# Patient Record
Sex: FEMALE | Race: WHITE | NOT HISPANIC OR LATINO | Employment: OTHER | URBAN - METROPOLITAN AREA
[De-identification: names, ages, dates, MRNs, and addresses within clinical notes are randomized per-mention and may not be internally consistent; named-entity substitution may affect disease eponyms.]

---

## 2017-09-19 ENCOUNTER — OFFICE VISIT (OUTPATIENT)
Dept: GYNECOLOGIC ONCOLOGY | Facility: CLINIC | Age: 67
End: 2017-09-19

## 2017-09-19 VITALS
OXYGEN SATURATION: 94 % | DIASTOLIC BLOOD PRESSURE: 80 MMHG | HEIGHT: 61 IN | SYSTOLIC BLOOD PRESSURE: 147 MMHG | HEART RATE: 73 BPM | TEMPERATURE: 98.4 F | RESPIRATION RATE: 16 BRPM | WEIGHT: 127 LBS | BODY MASS INDEX: 23.98 KG/M2

## 2017-09-19 DIAGNOSIS — C53.0 MALIGNANT NEOPLASM OF ENDOCERVIX (HCC): Primary | ICD-10-CM

## 2017-09-19 DIAGNOSIS — N90.89 VULVAR LESION: ICD-10-CM

## 2017-09-19 DIAGNOSIS — Z72.0 TOBACCO ABUSE: ICD-10-CM

## 2017-09-19 PROCEDURE — 99205 OFFICE O/P NEW HI 60 MIN: CPT | Performed by: OBSTETRICS & GYNECOLOGY

## 2017-09-19 PROCEDURE — 57454 BX/CURETT OF CERVIX W/SCOPE: CPT | Performed by: OBSTETRICS & GYNECOLOGY

## 2017-09-19 PROCEDURE — 88305 TISSUE EXAM BY PATHOLOGIST: CPT | Performed by: OBSTETRICS & GYNECOLOGY

## 2017-09-19 RX ORDER — HYDROXYCHLOROQUINE SULFATE 200 MG/1
100 TABLET, FILM COATED ORAL DAILY
Status: ON HOLD | COMMUNITY
Start: 2017-09-18 | End: 2020-06-25 | Stop reason: SDUPTHER

## 2017-09-19 RX ORDER — MELATONIN
1000 DAILY
COMMUNITY
End: 2022-11-21

## 2017-09-19 RX ORDER — ESTRADIOL 0.1 MG/G
2 CREAM VAGINAL WEEKLY
COMMUNITY
End: 2018-02-27 | Stop reason: HOSPADM

## 2017-09-19 NOTE — PROGRESS NOTES
Ro Walker  [id number]  1950      Reason for visit:  Cervical cancer noted on Pap smear, vulvar lesions    Consultation:  Patient is being seen at the request of Dinah Burrell for Cervical cancer noted on Pap smear, vulvar lesions    History of present illness:  The patient is a 67 y.o. year old female who presents today for treatment and evaluation of abnormal Pap smear and vulvar lesions.    On 2017 patient underwent Pap smear which was resulted as squamous cell carcinoma.  She reports that her previous Pap smear was in  in [place].  She denies history of abnormal Pap smear prior to this one.  She has not undergone biopsy at this point.  Her graft patient denies vaginal bleeding and discharge.  She denies intercourse for several years.  She does note urinary frequency.  She has a mild morning cough related to long-term tobacco abuse.  She is seeing an ENT related to polyps on her vocal cords.  She has follow-up due in 2 days.    Her personal history is remarkable for lupus for which she sees Dr. David Allen.  She reports that she had a couple flareups in  but has been doing well this year.    OBGYN History:  She is a .  She does not use HRT.       Oncologic History:   No history exists.         Past Medical History:   Diagnosis Date   • Lupus        Past Surgical History:   Procedure Laterality Date   • CATARACT EXTRACTION     • OTHER SURGICAL HISTORY      bandaid surgery   • RETINAL DETACHMENT REPAIR     • THROAT SURGERY         MEDICATIONS: The current medication list was reviewed with the patient and updated in the EMR this date per the Medical Assistant. Medication dosages and frequencies were confirmed to be accurate.      Allergies:  has No Known Allergies.    Social History:   Social History     Social History   • Marital status:      Spouse name: N/A   • Number of children: 4   • Years of education: N/A     Occupational History   • Not on file.     Social  History Main Topics   • Smoking status: Current Every Day Smoker     Packs/day: 1.00     Types: Cigarettes   • Smokeless tobacco: Not on file   • Alcohol use No   • Drug use: No   • Sexual activity: No     Other Topics Concern   • Not on file     Social History Narrative   • No narrative on file       Family History:    Family History   Problem Relation Age of Onset   • Stomach cancer Sister    • Cancer Brother    • Cancer Brother        Health Maintenance:    Health Maintenance   Topic Date Due   • TDAP/TD VACCINES (1 - Tdap) 04/07/1967   • PNEUMOCOCCAL VACCINES (65+ LOW/MEDIUM RISK) (1 of 2 - PCV13) 04/07/2013   • INFLUENZA VACCINE  08/01/2017   • HEPATITIS C SCREENING  09/19/2017   • MEDICARE ANNUAL WELLNESS  09/19/2017   • MAMMOGRAM  09/19/2017   • COLONOSCOPY  09/19/2017   • ZOSTER VACCINE  09/19/2017         Review of Systems   Constitutional: Negative for appetite change, chills, fatigue, fever and unexpected weight change.   HENT: Positive for sinus pressure and voice change. Negative for congestion, hearing loss and sore throat.    Eyes: Positive for visual disturbance. Negative for redness.   Respiratory: Negative for cough, shortness of breath and wheezing.    Cardiovascular: Negative for chest pain, palpitations and leg swelling.   Gastrointestinal: Negative for abdominal distention, abdominal pain, blood in stool, constipation, diarrhea, nausea and vomiting.   Endocrine: Negative.  Negative for cold intolerance and heat intolerance.   Genitourinary: Negative for difficulty urinating, dyspareunia, dysuria, frequency, genital sores, hematuria, pelvic pain, urgency, vaginal bleeding, vaginal discharge and vaginal pain.   Musculoskeletal: Negative for arthralgias, back pain, gait problem and joint swelling.   Skin: Negative for rash and wound.   Allergic/Immunologic: Negative for food allergies and immunocompromised state.   Neurological: Negative for dizziness, seizures, syncope, weakness,  "light-headedness, numbness and headaches.   Hematological: Negative for adenopathy. Bruises/bleeds easily.   Psychiatric/Behavioral: Negative.  Negative for confusion, dysphoric mood and sleep disturbance. The patient is not nervous/anxious.        Physical Exam    Vitals:    09/19/17 0957   BP: 147/80   Pulse: 73   Resp: 16   Temp: 98.4 °F (36.9 °C)   TempSrc: Temporal Artery    SpO2: 94%   Weight: 127 lb (57.6 kg)   Height: 61\" (154.9 cm)     Body mass index is 24 kg/(m^2).      GENERAL: Alert, well-appearing female appearing her stated age who is in no apparent distress.   HEENT: Sclera anicteric. Head normocephalic, atraumatic. Mucus membranes moist.   NECK: Trachea midline, supple, without masses.  No thyromegaly.   BREASTS: Deferred  CARDIOVASCULAR: Normal rate, regular rhythm, no murmurs, rubs, or gallops.  No peripheral edema.  RESPIRATORY: Clear to auscultation bilaterally, normal respiratory effort  BACK:  No CVA tenderness, no vertebral tenderness on palpation  GASTROINTESTINAL:  Abdomen is soft, non-tender, non-distended, no rebound or guarding, no masses, or hernias. No HSM.    SKIN:  Warm, dry, well-perfused.  All visible areas intact.  No rashes, lesions, ulcers.  Sun damage to exposed extremities.  PSYCHIATRIC: AO x3, with appropriate affect, normal thought processes.  NEUROLOGIC: No focal deficits.  Moves extremities well.  MUSCULOSKELETAL: Normal gait and station.   EXTREMITIES:   No cyanosis, clubbing, symmetric.  LYMPHATICS:  No cervical or inguinal adenopathy noted.     PELVIC exam:    GYNECOLOGIC:  External genitalia are remarkable for 3 distinct labial lesions.  These are pigmented, verrucoid lesions located at the right anterior/periclitoral area, and right labia minora x2.   The largest of these is about a centimeter in size.  On speculum examination, the cervix was initially thought to be free from lesion, but upon closer inspection areas suspicious for cancer was identified.  Please refer " to colposcopy.  On bimanual examination no mass was appreciated.  Uterus was normal in size and shape. There is no cervical motion or uterine tenderness. Parametria were smooth. Rectovaginal exam was confirmatory.    ECOG PS 0    PROCEDURES:  After obtaining informed consent, colposcopy was performed of the cervix.  3% acetic acid was applied.  Colposcopy was adequate.  Findings were remarkable for ulcerative clock located from 3 to 5:00 and high-grade dysplastic changes at the remainder of the transition zone.  Findings were concerning for visible cancer (clinical stage I B1) Biopsy was performed at 12:00 and 3/5:00.  Endocervical curettings were also obtained.  Adequate hemostasis was noted at the end of the procedure.  Procedure was well-tolerated.      Diagnostic Data:    No results found for: ]      Assessment/Plan   This is a 67 y.o. woman findings concerning for clinical stage IB 1 (T1 B1 N0M0) squamous cell carcinoma of the cervix, multiple suspicious vulvar lesions, tobacco abuse.      Cervical cancer  -Colposcopy and biopsies were obtained today.  Patient understands that there likely she'll need cervical conization for additional tissue in order to make recommendations regarding hysterectomy versus radical hysterectomy or radiation.  If patient is a candidate for radical hysterectomy or radiation, at this point she would prefer radiation due to concerns about anesthesia.    Multiple verrucoid vulvar lesions  -These are concerning for condyloma acuminata versus REILLY with pigmentation (bowenoid disease could not be excluded).   These lesions will need to be surgically excised.  I plan on doing this at the time of cervical conization.    Tobacco abuse  -Patient reports that she had quit smoking due to concern about vocal cord polyps.  However, she became stress when she heard about her abnormal Pap smear and started smoking again.  She is further educated regarding the origins of cervical cancer  including HPV and tobacco as a contributing risk factor.    Diagrams were drawn and used in counseling to further educate the patient and her daughter on her conditions.    FOLLOW UP: Return for patient is to call for results.    Note: Speech recognition transcription software was used to dictate portions of this document.  An attempt at proofreading has been made though minor errors in transcription may still be present.  Please do not hesitate to call our office with any questions.      Electronically Signed by: María Dietrich MD  Date: 9/19/2017

## 2017-09-21 LAB
CYTO UR: NORMAL
LAB AP CASE REPORT: NORMAL
LAB AP CLINICAL INFORMATION: NORMAL
Lab: NORMAL
PATH REPORT.FINAL DX SPEC: NORMAL
PATH REPORT.GROSS SPEC: NORMAL

## 2017-09-25 ENCOUNTER — TELEPHONE (OUTPATIENT)
Dept: GYNECOLOGIC ONCOLOGY | Facility: CLINIC | Age: 67
End: 2017-09-25

## 2017-09-25 NOTE — TELEPHONE ENCOUNTER
I called pt to discuss biopsy results.  I recommended cervical conization.  She is unsure at this point.  She was strongly encouraged to undergo additional evaluation to confirm or refute invasive cancer diagnosis.  We discussed how cold knife conization could also be a therapeutic benefit.  All questions were answered.  I'm going to have my  call the patient for surgical plan.  However, the patient would prefer to wait a couple days so she can talk to offer children.  She was encouraged to get procedure done..

## 2017-10-02 ENCOUNTER — TELEPHONE (OUTPATIENT)
Dept: GYNECOLOGIC ONCOLOGY | Facility: CLINIC | Age: 67
End: 2017-10-02

## 2017-10-02 NOTE — TELEPHONE ENCOUNTER
Phoned pt to schedule date for outpatient surgery with Dr. Dietrich.  Unable to leave message on cell, voice mailbox not set up yet.

## 2017-10-10 ENCOUNTER — TELEPHONE (OUTPATIENT)
Dept: GYNECOLOGIC ONCOLOGY | Facility: CLINIC | Age: 67
End: 2017-10-10

## 2017-10-10 NOTE — TELEPHONE ENCOUNTER
Phoned pt to schedule date for outpatient surgery scheduling with Dr. Dietrich.  Pt states is still undecided, needs to talk with her son whom she not been able to reach yet, he is in the  and she has not had a chance to talk to him yet. States she will get back with me next week.  Told her I would call her if I did not hear from her by the end of next week.  Pt v/u, will call me next week.

## 2017-10-23 ENCOUNTER — TELEPHONE (OUTPATIENT)
Dept: GYNECOLOGIC ONCOLOGY | Facility: CLINIC | Age: 67
End: 2017-10-23

## 2017-10-26 ENCOUNTER — TELEPHONE (OUTPATIENT)
Dept: GYNECOLOGIC ONCOLOGY | Facility: CLINIC | Age: 67
End: 2017-10-26

## 2017-10-26 NOTE — TELEPHONE ENCOUNTER
I called pt to discuss difficulty scheduling [facility] for CIS of cervix.  Readdressed pre-cancer vs invasive cancer, need for additional tissue.  Patient said that she would rather die than get radiation.  I advised her don't have a diagnosis of invasive cancer at this point and strongly recommended she consider the planned procedure.  She stated that she saw Dinah Burrell and Dinah recommended that she undergo this procedure.  She stated she would talk to her son about it.  She was offered repeat office visit for discussion of biopsy results versus preoperative discussion at the time of cervical conization.  She promised to call back.  I told her she is really too young to die because of this.  She verbalized understanding.

## 2017-11-01 ENCOUNTER — PREP FOR SURGERY (OUTPATIENT)
Dept: GYNECOLOGIC ONCOLOGY | Facility: CLINIC | Age: 67
End: 2017-11-01

## 2017-11-01 DIAGNOSIS — D06.9 CIN III (CERVICAL INTRAEPITHELIAL NEOPLASIA GRADE III) WITH SEVERE DYSPLASIA: Primary | ICD-10-CM

## 2017-11-28 ENCOUNTER — TELEPHONE (OUTPATIENT)
Dept: GYNECOLOGIC ONCOLOGY | Facility: CLINIC | Age: 67
End: 2017-11-28

## 2017-11-28 NOTE — TELEPHONE ENCOUNTER
Phoned pt to schedule date for surgery, states she is not scheduling anything.  Reminded pt of her phone conversation with Dr. Dietrich.  Pt v/u of conversation.  Told her I would let Dr. Dietrich she has chosen not to do anything.

## 2017-12-04 ENCOUNTER — DOCUMENTATION (OUTPATIENT)
Dept: GYNECOLOGIC ONCOLOGY | Facility: CLINIC | Age: 67
End: 2017-12-04

## 2017-12-04 NOTE — PROGRESS NOTES
Serena Dietrich MD                     Patient phoned to make an appointment to come in with her children to discuss surgery.

## 2017-12-28 ENCOUNTER — OFFICE VISIT (OUTPATIENT)
Dept: GYNECOLOGIC ONCOLOGY | Facility: CLINIC | Age: 67
End: 2017-12-28

## 2017-12-28 ENCOUNTER — PREP FOR SURGERY (OUTPATIENT)
Dept: GYNECOLOGIC ONCOLOGY | Facility: CLINIC | Age: 67
End: 2017-12-28

## 2017-12-28 VITALS
HEART RATE: 91 BPM | DIASTOLIC BLOOD PRESSURE: 78 MMHG | BODY MASS INDEX: 23.22 KG/M2 | TEMPERATURE: 98.5 F | RESPIRATION RATE: 14 BRPM | WEIGHT: 123 LBS | OXYGEN SATURATION: 98 % | SYSTOLIC BLOOD PRESSURE: 148 MMHG | HEIGHT: 61 IN

## 2017-12-28 DIAGNOSIS — D06.9 CARCINOMA IN SITU OF CERVIX, UNSPECIFIED LOCATION: Primary | ICD-10-CM

## 2017-12-28 DIAGNOSIS — D06.9 CIN III (CERVICAL INTRAEPITHELIAL NEOPLASIA GRADE III) WITH SEVERE DYSPLASIA: Primary | ICD-10-CM

## 2017-12-28 PROCEDURE — 99213 OFFICE O/P EST LOW 20 MIN: CPT | Performed by: OBSTETRICS & GYNECOLOGY

## 2017-12-28 NOTE — PROGRESS NOTES
Patient presented today with her family for discussion regarding cervical biopsies obtained at last visit.  She has been contacted multiple times regarding cervical conization and need for additional tissue for diagnosis.  She was afraid of possible cancer diagnosis and has had delay in follow-up related to this year.    Patient and family were provided with a copy of her pathology report.  I personally daphne diagrams explaining different levels of cervical dysplasia versus invasive cancer.  I drew diagrams of female anatomy explaining the need for additional tissue in that there could be a therapeutic as well as diagnostic benefit of cervical conization.  Patient family verbalized understanding of the rationale and the plan.  Patient and family understand that invasive cancer needs to be excluded.  They understand treatment recommendations will be made based on pathology report and any additional required information to be addressed based on final pathology report such as radiologic imaging.    Patient was consented for cold knife conization.      Risks and benefits of surgery were discussed.  This included, but was not limited to, infection and bleeding like when the skin is cut; damage to surrounding structures; and incisional complications.  Risk of DVT was addressed for major surgeries.  Standard of care efforts to minimize these risks were reviewed.  Typical hospital stay and recovery were discussed as well as post-procedure precautions.  Surgical implications of chronic illnesses on recovery and surgical outcome were reviewed.     Patient verbalized understanding of the plan including the risks and benefits.  Appropriate perioperative testing including laboratory evaluation, EKG as clinically indicated, chest x-ray as clinically indicated, and preadmission evaluation were all ordered as a part of this patient's care.    This was a 15 minute long visit with the entirety spent face-to-face consultation  discussing carcinoma in situ diagnosis and treatment thereof.    María Dietrich MD  12/28/17  6:19 PM

## 2018-01-03 ENCOUNTER — PATIENT EDUCATION (SURGERY INSTRUCTIONS) (OUTPATIENT)
Dept: GYNECOLOGIC ONCOLOGY | Facility: CLINIC | Age: 68
End: 2018-01-03

## 2018-01-03 ENCOUNTER — TELEPHONE (OUTPATIENT)
Dept: GYNECOLOGIC ONCOLOGY | Facility: CLINIC | Age: 68
End: 2018-01-03

## 2018-01-03 NOTE — TELEPHONE ENCOUNTER
Orders faxed successfully to [facility] for pt's upcoming outpt procedure with Dr. Dietrich.

## 2018-01-03 NOTE — PATIENT INSTRUCTIONS
Outpatient Pre-op Patient Education  *See checked boxes for your instructions*    Ro Walker  [id number]  1950    SURGEON: Dr. Dietrich    Appointment  [x]  1. Your surgery has been scheduled on 1-8-18 at [facility] [place] located at [address]. You will need to be there at 6:30 AM.   [x] 2.  You have pre-admission testing (PAT) appointment on 1-7-18 at 4:15 PM.  You will need to be at hospital registration 10 minutes before that time.    [x] 3.  The registration department is located in the long hallway between the [address].       The Day(s) Before Surgery  [x] 1.  Do not drink alcohol or smoke.     [x] 2.  Do not take vitamins or aspirin one week before surgery.       [x] 3.  If you are ill on the days leading up to your surgery, please call our office.      [x] 4.  If you are using medications for diabetes, call the physician who manages these and get instructions on how they should be taken before and after surgery.    [x] 5.  Nothing to eat or drink after midnight on 1-7-18.      [x] 6.  Please make prior arrangements for someone to drive you home after your procedure        The Day of Surgery  [x] 1.  Do not eat, drink, or chew gum.     [x] 2.  On the morning of your surgery, you may take her prescription medications with a sip of water. Bring all medication with you to the surgery center. (Diabetic patients should bring insulin if instructed to do so by their diabetes managing physician).   [x] 3. Bathe or shower the morning of your surgery. Do not use powders, lotions, or creams. Deodorants are okay.     [x] 4. Wear loose, comfortable clothing.     [x] 5. Bring holders for glasses, contacts, or dentures.      [x] 6. Bring any required payment and forms, including insurance cards. Leave all money and valuables at home.        Post-surgery Instructions  [x] 1.  For the first 24 hours, rest and take periodic deep breaths to remove anesthetic agents from your  body.   [x] 2.  Follow any specific instructions relevant to your particular surgery.     [x] 3.  Limit activity to avoid stress to the surgical site.      [x] 4.  Keep all dressings dry. Shower or bathe as instructed by your doctor.     [x] 5.  Drink and eat light foods. Remain on liquids alone only if nausea and vomiting occur. Return to your regular diet gradually, as tolerance allows.    [x] 6. Avoid alcohol for at least 24 hours.      [x] 7.  Take prescription pain medication as directed and with food.      [x] 8.  Call our office after discharge from the surgery center to make your post-op appointment.        In Case of Emergency:  Call our office if you experience any of the following:  - Excessive drainage, bleeding, swelling, or redness at the incision site  - Severe pain not eased by pain medication  - Temperature above 101  - Persistent nausea or vomiting  - Skin rash or general body itching

## 2018-01-07 ENCOUNTER — APPOINTMENT (OUTPATIENT)
Dept: PREADMISSION TESTING | Facility: HOSPITAL | Age: 68
End: 2018-01-07

## 2018-01-07 ENCOUNTER — HOSPITAL ENCOUNTER (OUTPATIENT)
Dept: GENERAL RADIOLOGY | Facility: HOSPITAL | Age: 68
Discharge: HOME | End: 2018-01-07
Admitting: OBSTETRICS & GYNECOLOGY

## 2018-01-07 DIAGNOSIS — D06.9 CIN III (CERVICAL INTRAEPITHELIAL NEOPLASIA GRADE III) WITH SEVERE DYSPLASIA: ICD-10-CM

## 2018-01-07 DIAGNOSIS — D06.9 CARCINOMA IN SITU OF CERVIX, UNSPECIFIED LOCATION: ICD-10-CM

## 2018-01-07 LAB
ALBUMIN SERPL-MCNC: 4.3 G/DL (ref 3.2–4.8)
ALBUMIN/GLOB SERPL: 1.5 G/DL (ref 1.5–2.5)
ALP SERPL-CCNC: 86 U/L (ref 25–100)
ALT SERPL W P-5'-P-CCNC: 25 U/L (ref 7–40)
ANION GAP SERPL CALCULATED.3IONS-SCNC: 3 MMOL/L (ref 3–11)
AST SERPL-CCNC: 29 U/L (ref 0–33)
BASOPHILS # BLD AUTO: 0.01 10*3/MM3 (ref 0–0.2)
BASOPHILS NFR BLD AUTO: 0.2 % (ref 0–1)
BILIRUB SERPL-MCNC: 0.2 MG/DL (ref 0.3–1.2)
BUN BLD-MCNC: 19 MG/DL (ref 9–23)
BUN/CREAT SERPL: 27.1 (ref 7–25)
CALCIUM SPEC-SCNC: 9.1 MG/DL (ref 8.7–10.4)
CHLORIDE SERPL-SCNC: 107 MMOL/L (ref 99–109)
CO2 SERPL-SCNC: 30 MMOL/L (ref 20–31)
CREAT BLD-MCNC: 0.7 MG/DL (ref 0.6–1.3)
DEPRECATED RDW RBC AUTO: 43.9 FL (ref 37–54)
EOSINOPHIL # BLD AUTO: 0.07 10*3/MM3 (ref 0–0.3)
EOSINOPHIL NFR BLD AUTO: 1.4 % (ref 0–3)
ERYTHROCYTE [DISTWIDTH] IN BLOOD BY AUTOMATED COUNT: 13.2 % (ref 11.3–14.5)
GFR SERPL CREATININE-BSD FRML MDRD: 83 ML/MIN/1.73
GLOBULIN UR ELPH-MCNC: 2.9 GM/DL
GLUCOSE BLD-MCNC: 75 MG/DL (ref 70–100)
HCT VFR BLD AUTO: 40.2 % (ref 34.5–44)
HGB BLD-MCNC: 13.2 G/DL (ref 11.5–15.5)
IMM GRANULOCYTES # BLD: 0.01 10*3/MM3 (ref 0–0.03)
IMM GRANULOCYTES NFR BLD: 0.2 % (ref 0–0.6)
LYMPHOCYTES # BLD AUTO: 1.7 10*3/MM3 (ref 0.6–4.8)
LYMPHOCYTES NFR BLD AUTO: 33 % (ref 24–44)
MCH RBC QN AUTO: 29.8 PG (ref 27–31)
MCHC RBC AUTO-ENTMCNC: 32.8 G/DL (ref 32–36)
MCV RBC AUTO: 90.7 FL (ref 80–99)
MONOCYTES # BLD AUTO: 0.24 10*3/MM3 (ref 0–1)
MONOCYTES NFR BLD AUTO: 4.7 % (ref 0–12)
NEUTROPHILS # BLD AUTO: 3.12 10*3/MM3 (ref 1.5–8.3)
NEUTROPHILS NFR BLD AUTO: 60.5 % (ref 41–71)
PLATELET # BLD AUTO: 113 10*3/MM3 (ref 150–450)
PMV BLD AUTO: 11.7 FL (ref 6–12)
POTASSIUM BLD-SCNC: 3.9 MMOL/L (ref 3.5–5.5)
PROT SERPL-MCNC: 7.2 G/DL (ref 5.7–8.2)
RBC # BLD AUTO: 4.43 10*6/MM3 (ref 3.89–5.14)
SODIUM BLD-SCNC: 140 MMOL/L (ref 132–146)
WBC NRBC COR # BLD: 5.15 10*3/MM3 (ref 3.5–10.8)

## 2018-01-07 PROCEDURE — 93010 ELECTROCARDIOGRAM REPORT: CPT | Performed by: INTERNAL MEDICINE

## 2018-01-07 PROCEDURE — 36415 COLL VENOUS BLD VENIPUNCTURE: CPT

## 2018-01-07 PROCEDURE — 93005 ELECTROCARDIOGRAM TRACING: CPT

## 2018-01-07 PROCEDURE — 85025 COMPLETE CBC W/AUTO DIFF WBC: CPT | Performed by: OBSTETRICS & GYNECOLOGY

## 2018-01-07 PROCEDURE — 71046 X-RAY EXAM CHEST 2 VIEWS: CPT

## 2018-01-07 PROCEDURE — 80053 COMPREHEN METABOLIC PANEL: CPT | Performed by: OBSTETRICS & GYNECOLOGY

## 2018-01-08 ENCOUNTER — OUTSIDE FACILITY SERVICE (OUTPATIENT)
Dept: GYNECOLOGIC ONCOLOGY | Facility: CLINIC | Age: 68
End: 2018-01-08

## 2018-01-08 ENCOUNTER — LAB REQUISITION (OUTPATIENT)
Dept: LAB | Facility: HOSPITAL | Age: 68
End: 2018-01-08

## 2018-01-08 DIAGNOSIS — D06.9 CARCINOMA IN SITU OF CERVIX: ICD-10-CM

## 2018-01-08 PROCEDURE — 88305 TISSUE EXAM BY PATHOLOGIST: CPT | Performed by: OBSTETRICS & GYNECOLOGY

## 2018-01-08 PROCEDURE — 57520 CONIZATION OF CERVIX: CPT | Performed by: OBSTETRICS & GYNECOLOGY

## 2018-01-08 PROCEDURE — 88307 TISSUE EXAM BY PATHOLOGIST: CPT | Performed by: OBSTETRICS & GYNECOLOGY

## 2018-01-08 PROCEDURE — 56606 BIOPSY OF VULVA/PERINEUM: CPT | Performed by: OBSTETRICS & GYNECOLOGY

## 2018-01-08 PROCEDURE — 56605 BIOPSY OF VULVA/PERINEUM: CPT | Performed by: OBSTETRICS & GYNECOLOGY

## 2018-01-23 ENCOUNTER — OFFICE VISIT (OUTPATIENT)
Dept: GYNECOLOGIC ONCOLOGY | Facility: CLINIC | Age: 68
End: 2018-01-23

## 2018-01-23 VITALS
HEART RATE: 73 BPM | WEIGHT: 123 LBS | RESPIRATION RATE: 14 BRPM | SYSTOLIC BLOOD PRESSURE: 184 MMHG | TEMPERATURE: 97.9 F | DIASTOLIC BLOOD PRESSURE: 83 MMHG | OXYGEN SATURATION: 97 % | BODY MASS INDEX: 23.24 KG/M2

## 2018-01-23 DIAGNOSIS — D06.9 CIN III (CERVICAL INTRAEPITHELIAL NEOPLASIA GRADE III) WITH SEVERE DYSPLASIA: Primary | ICD-10-CM

## 2018-01-23 PROCEDURE — 99024 POSTOP FOLLOW-UP VISIT: CPT | Performed by: OBSTETRICS & GYNECOLOGY

## 2018-01-24 ENCOUNTER — PREP FOR SURGERY (OUTPATIENT)
Dept: GYNECOLOGIC ONCOLOGY | Facility: CLINIC | Age: 68
End: 2018-01-24

## 2018-01-24 DIAGNOSIS — D06.9 CARCINOMA IN SITU OF CERVIX, UNSPECIFIED LOCATION: Primary | ICD-10-CM

## 2018-01-25 RX ORDER — SODIUM CHLORIDE, SODIUM LACTATE, POTASSIUM CHLORIDE, CALCIUM CHLORIDE 600; 310; 30; 20 MG/100ML; MG/100ML; MG/100ML; MG/100ML
100 INJECTION, SOLUTION INTRAVENOUS CONTINUOUS
Status: CANCELLED | OUTPATIENT
Start: 2018-01-25

## 2018-01-25 RX ORDER — PREGABALIN 25 MG/1
150 CAPSULE ORAL ONCE
Status: CANCELLED | OUTPATIENT
Start: 2018-01-25 | End: 2018-01-25

## 2018-01-25 RX ORDER — CELECOXIB 100 MG/1
200 CAPSULE ORAL ONCE
Status: CANCELLED | OUTPATIENT
Start: 2018-01-25 | End: 2018-01-25

## 2018-01-25 RX ORDER — ACETAMINOPHEN 325 MG/1
650 TABLET ORAL ONCE
Status: CANCELLED | OUTPATIENT
Start: 2018-01-25 | End: 2018-01-25

## 2018-01-26 ENCOUNTER — PATIENT EDUCATION (SURGERY INSTRUCTIONS) (OUTPATIENT)
Dept: GYNECOLOGIC ONCOLOGY | Facility: CLINIC | Age: 68
End: 2018-01-26

## 2018-01-26 ENCOUNTER — PREP FOR SURGERY (OUTPATIENT)
Dept: GYNECOLOGIC ONCOLOGY | Facility: CLINIC | Age: 68
End: 2018-01-26

## 2018-01-26 DIAGNOSIS — D06.9 CARCINOMA IN SITU OF CERVIX, UNSPECIFIED LOCATION: Primary | ICD-10-CM

## 2018-01-26 NOTE — PATIENT INSTRUCTIONS
Gynecologic Oncology  Inpatient Pre-op Patient Education  *See checked boxes for your instructions*    Patient Name:  Ro Walker  [id number]  1950    Surgeon:  Dr. Dietrich    Appointment  [x]  1. Your surgery has been scheduled on 2-26-18. You will need to be at the second floor surgery registration of the [facility] hospital on that day at 6:00 AM.   [x] 2.  You will need to go to [facility] anytime on 2-21-18 to have blood work done.  You do not need to be fasting.    [] 3.  The hospital registration department is located in the long hallway between the [address].     The Day(s) Before Surgery  [x] 1. On 2-25-18, the day prior to surgery, eat lightly.  No solid food after midnight on 2-25-18, including NO MILK, CREAM, OR ORANGE JUICE.  You may have sips of clear fluids up until two-three hours prior to your arrival to the hospital on the morning of surgery.     [] 2.  You may need to use a stool softener, the day prior to surgery to help with existing constipation and to clean out your bowels.  You can purchase Miralax over-the-counter at the pharmacy and follow the directions on the back.  (Do not do this step unless the box is checked).   [x] 3.  Do not take vitamins or full dose aspirin one week before surgery.  If you normally take a blood thinning medication such as Warfarin, Eliquis, or Xarelto, we will give you specific instructions regarding these medications and we may need to talk with your other doctors.   [x] 4.  On the morning of your surgery, you may likely take your routine prescription medications with a sip of water as reviewed with you by your surgeon.  Bring your home medications with you to the hospital as we may need to reference these.  In particular be sure to bring any inhalers.     Post-surgery Instructions  [x] 1.  The length of stay for your type of surgery is typically one hospital night, however it is also possible that you could be discharged home in the  evening on the same day depending on the nature of your surgery.  All rooms are private, so family member may stay with you.     [x] 2.  Do not take your own home prescription medication while you are in the hospital unless otherwise instructed.  These will be provided to you.         Comments:

## 2018-01-27 NOTE — PROGRESS NOTES
Ro Walker  [id number]  1950      Reason for Visit:  Postoperative evaluation    History of Present Illness:  Patient is a very pleasant 67 y.o. woman who presents for a post operative evaluation status post cold knife conization of cervix and multiple biopsies of vulva/perianal areas performed on 1/8/2018.      Surgery was uncomplicated.  Today, patient notes normal bowel and bladder function.  Her pain is well controlled. She has questions about resuming normal activities.  She is worried about foul-smelling discharge and wanted to come for an earlier evaluation.    Past Medical History, Past Surgical History, Social History, Family History have been reviewed and are without significant changes except as mentioned.    Review of Systems   A comprehensive 12 point review of systems was performed and was negative except as mentioned.    Medications:  The current medication list was reviewed in the EMR    ALLERGIES:  No Known Allergies        BP (!) 184/83  Pulse 73  Temp 97.9 °F (36.6 °C) (Temporal Artery )   Resp 14  Wt 55.8 kg (123 lb)  SpO2 97%  BMI 23.24 kg/m2       Physical Exam  Constitutional:  Patient is a pleasant woman in no acute distress.  Gastrointestinal: Abdomen is soft and appropriately tender.  There is no mass palpated.  There is no rebound or guarding.    Extremities:  Bilateral lower extremities are non-tender.  Gynecologic:GYNECOLOGIC:  External genitalia are healing appropriately including multiple vulvar and perianal areas. On speculum examination, the cervix was healing appropriately and remarkable for recent cauterization procedure.  Discharge was noted.  On bimanual examination no mass was appreciated.  Uterus was normal in size and shape. There is no cervical motion or uterine tenderness.  Cervix was largely surgically absent. Parametria were smooth. Rectovaginal exam was deferred.       PATHOLOGY:  Final Diagnosis   1. CERVIX, CONE BIOPSY:  Extensive squamous carcinoma  in-situ involving all quadrants.   Extensive endocervical cleft involvement.  Ectocervical and endocervical margins involved.   Negative for invasive carcinoma.  2. ENDOCERVICAL CURETTINGS:  Fragments of squamous carcinoma in-situ.  Background fragments of benign endocervix and endometrium.   3. RIGHT SIDED VULVAR LESION #1:  Condyloma with mild to moderate squamous dysplasia and cellular changes of HPV.   4. RIGHT SIDED VULVAR LESION #2, BIOPSY:   Condyloma with mild to moderate squamous dysplasia and cellular changes of HPV.  5. RIGHT SIDED VULVAR LESION #3, BIOPSY:  Condyloma with mild squamous dysplasia and cellular changes of HPV.  6. LEFT VULVAR LESION, BIOPSY:  Pigmented seborrheic keratosis.   7. PERIANAL VULVAR LESION #1, BIOPSY:  Condyloma with mild squamous dysplasia and cellular changes of HPV.  8. PERIANAL VULVAR LESION #2, BIOPSY:  Condyloma with mild squamous dysplasia and cellular changes of HPV.   9. PERIANAL VULVAR LESION #3, BIOPSY:  Condyloma with mild squamous dysplasia and cellular changes of HPV.  10. PERIANAL VULVAR LESION #4, BIOPSY:  Pigmented condyloma with moderate squamous dysplasia and cellular changes of HPV.   11. PERIANAL VULVAR LESION #5, BIOPSY:  Benign pigmented keratosis.        ASSESSMENT/PLAN:  Ro MIGDALIA Walker returns for a post-operative evaluation today.  All pathology reports were given to patient.    Given the extensive squamous cell carcinoma in situ and multiple positive margins, plan was made for laparoscopic assisted vaginal hysterectomy and bilateral salpingo-oophorectomy.    Risks and benefits of surgery were discussed.  This included, but was not limited to, infection and bleeding like when the skin is cut; damage to surrounding structures; and incisional complications.  Risk of DVT was addressed for major surgeries.  Standard of care efforts to minimize these risks were reviewed.  Typical hospital stay and recovery were discussed as well as post-procedure  precautions.  Surgical implications of chronic illnesses on recovery and surgical outcome were reviewed.     Patient verbalized understanding of the plan including the risks and benefits.  Appropriate perioperative testing including laboratory evaluation, EKG as clinically indicated, chest x-ray as clinically indicated, and preadmission evaluation were all ordered as a part of this patient's care.    She was again encouraged to discontinue smoking.    She is to Return for surgery.    Note: Speech recognition transcription software was used to dictate portions of this document.  An attempt at proofreading has been made though minor errors in transcription may still be present.  Please do not hesitate to call our office with any questions.    María Dietrich MD

## 2018-01-29 RX ORDER — PREGABALIN 25 MG/1
150 CAPSULE ORAL ONCE
Status: CANCELLED | OUTPATIENT
Start: 2018-01-29 | End: 2018-01-29

## 2018-01-29 RX ORDER — CELECOXIB 100 MG/1
200 CAPSULE ORAL ONCE
Status: CANCELLED | OUTPATIENT
Start: 2018-01-29 | End: 2018-01-29

## 2018-01-29 RX ORDER — SODIUM CHLORIDE, SODIUM LACTATE, POTASSIUM CHLORIDE, CALCIUM CHLORIDE 600; 310; 30; 20 MG/100ML; MG/100ML; MG/100ML; MG/100ML
100 INJECTION, SOLUTION INTRAVENOUS CONTINUOUS
Status: CANCELLED | OUTPATIENT
Start: 2018-01-29

## 2018-01-29 RX ORDER — ACETAMINOPHEN 325 MG/1
650 TABLET ORAL ONCE
Status: CANCELLED | OUTPATIENT
Start: 2018-01-29 | End: 2018-01-29

## 2018-01-31 DIAGNOSIS — D06.9 CIN III (CERVICAL INTRAEPITHELIAL NEOPLASIA GRADE III) WITH SEVERE DYSPLASIA: ICD-10-CM

## 2018-01-31 DIAGNOSIS — C53.0 MALIGNANT NEOPLASM OF ENDOCERVIX (HCC): Primary | ICD-10-CM

## 2018-02-05 ENCOUNTER — TELEPHONE (OUTPATIENT)
Dept: GYNECOLOGIC ONCOLOGY | Facility: CLINIC | Age: 68
End: 2018-02-05

## 2018-02-05 NOTE — TELEPHONE ENCOUNTER
Serena BERMUDEZ [facility] Clinical Pool        Phone Number: [phone]                     Patient states she was retuning a nurses call. Please call back              Returned pt's call, informed her of later time available per her request on 1-26-18, be at hospital at 0730. Pt v/u.

## 2018-02-25 ENCOUNTER — ANESTHESIA EVENT (OUTPATIENT)
Dept: PERIOP | Facility: HOSPITAL | Age: 68
End: 2018-02-25

## 2018-02-25 RX ORDER — FAMOTIDINE 10 MG/ML
20 INJECTION, SOLUTION INTRAVENOUS ONCE
Status: CANCELLED | OUTPATIENT
Start: 2018-02-25 | End: 2018-02-25

## 2018-02-26 ENCOUNTER — ANESTHESIA (OUTPATIENT)
Dept: PERIOP | Facility: HOSPITAL | Age: 68
End: 2018-02-26

## 2018-02-26 ENCOUNTER — HOSPITAL ENCOUNTER (OUTPATIENT)
Facility: HOSPITAL | Age: 68
Discharge: HOME | End: 2018-02-27
Attending: OBSTETRICS & GYNECOLOGY | Admitting: OBSTETRICS & GYNECOLOGY

## 2018-02-26 DIAGNOSIS — D06.9 CIN III (CERVICAL INTRAEPITHELIAL NEOPLASIA GRADE III) WITH SEVERE DYSPLASIA: ICD-10-CM

## 2018-02-26 DIAGNOSIS — C53.0 MALIGNANT NEOPLASM OF ENDOCERVIX (HCC): ICD-10-CM

## 2018-02-26 DIAGNOSIS — D06.9 CARCINOMA IN SITU OF CERVIX, UNSPECIFIED LOCATION: ICD-10-CM

## 2018-02-26 LAB
ABO GROUP BLD: NORMAL
ABO GROUP BLD: NORMAL
ALBUMIN SERPL-MCNC: 3.9 G/DL (ref 3.2–4.8)
ALBUMIN/GLOB SERPL: 1.2 G/DL (ref 1.5–2.5)
ALP SERPL-CCNC: 82 U/L (ref 25–100)
ALT SERPL W P-5'-P-CCNC: 17 U/L (ref 7–40)
ANION GAP SERPL CALCULATED.3IONS-SCNC: 4 MMOL/L (ref 3–11)
AST SERPL-CCNC: 25 U/L (ref 0–33)
BASOPHILS # BLD AUTO: 0.01 10*3/MM3 (ref 0–0.2)
BASOPHILS NFR BLD AUTO: 0.2 % (ref 0–1)
BILIRUB SERPL-MCNC: 0.3 MG/DL (ref 0.3–1.2)
BLD GP AB SCN SERPL QL: NEGATIVE
BUN BLD-MCNC: 13 MG/DL (ref 9–23)
BUN/CREAT SERPL: 18.6 (ref 7–25)
CALCIUM SPEC-SCNC: 9.1 MG/DL (ref 8.7–10.4)
CHLORIDE SERPL-SCNC: 109 MMOL/L (ref 99–109)
CO2 SERPL-SCNC: 23 MMOL/L (ref 20–31)
CREAT BLD-MCNC: 0.7 MG/DL (ref 0.6–1.3)
DEPRECATED RDW RBC AUTO: 45 FL (ref 37–54)
EOSINOPHIL # BLD AUTO: 0.17 10*3/MM3 (ref 0–0.3)
EOSINOPHIL NFR BLD AUTO: 3.3 % (ref 0–3)
ERYTHROCYTE [DISTWIDTH] IN BLOOD BY AUTOMATED COUNT: 13.6 % (ref 11.3–14.5)
GFR SERPL CREATININE-BSD FRML MDRD: 83 ML/MIN/1.73
GLOBULIN UR ELPH-MCNC: 3.2 GM/DL
GLUCOSE BLD-MCNC: 104 MG/DL (ref 70–100)
HCT VFR BLD AUTO: 38.9 % (ref 34.5–44)
HGB BLD-MCNC: 12.6 G/DL (ref 11.5–15.5)
IMM GRANULOCYTES # BLD: 0.01 10*3/MM3 (ref 0–0.03)
IMM GRANULOCYTES NFR BLD: 0.2 % (ref 0–0.6)
LYMPHOCYTES # BLD AUTO: 1.48 10*3/MM3 (ref 0.6–4.8)
LYMPHOCYTES NFR BLD AUTO: 28.5 % (ref 24–44)
MCH RBC QN AUTO: 29.4 PG (ref 27–31)
MCHC RBC AUTO-ENTMCNC: 32.4 G/DL (ref 32–36)
MCV RBC AUTO: 90.7 FL (ref 80–99)
MONOCYTES # BLD AUTO: 0.34 10*3/MM3 (ref 0–1)
MONOCYTES NFR BLD AUTO: 6.5 % (ref 0–12)
NEUTROPHILS # BLD AUTO: 3.19 10*3/MM3 (ref 1.5–8.3)
NEUTROPHILS NFR BLD AUTO: 61.3 % (ref 41–71)
PLATELET # BLD AUTO: 102 10*3/MM3 (ref 150–450)
PMV BLD AUTO: 11.9 FL (ref 6–12)
POTASSIUM BLD-SCNC: 4.2 MMOL/L (ref 3.5–5.5)
PROT SERPL-MCNC: 7.1 G/DL (ref 5.7–8.2)
RBC # BLD AUTO: 4.29 10*6/MM3 (ref 3.89–5.14)
RH BLD: POSITIVE
RH BLD: POSITIVE
SODIUM BLD-SCNC: 136 MMOL/L (ref 132–146)
WBC NRBC COR # BLD: 5.2 10*3/MM3 (ref 3.5–10.8)

## 2018-02-26 PROCEDURE — 25010000002 PROPOFOL 1000 MG/ML EMULSION: Performed by: NURSE ANESTHETIST, CERTIFIED REGISTERED

## 2018-02-26 PROCEDURE — 63710000001 ACETAMINOPHEN 325 MG TABLET: Performed by: OBSTETRICS & GYNECOLOGY

## 2018-02-26 PROCEDURE — 85025 COMPLETE CBC W/AUTO DIFF WBC: CPT | Performed by: OBSTETRICS & GYNECOLOGY

## 2018-02-26 PROCEDURE — 63710000001 FAMOTIDINE 20 MG TABLET: Performed by: OBSTETRICS & GYNECOLOGY

## 2018-02-26 PROCEDURE — A9270 NON-COVERED ITEM OR SERVICE: HCPCS | Performed by: OBSTETRICS & GYNECOLOGY

## 2018-02-26 PROCEDURE — 86900 BLOOD TYPING SEROLOGIC ABO: CPT | Performed by: OBSTETRICS & GYNECOLOGY

## 2018-02-26 PROCEDURE — G0378 HOSPITAL OBSERVATION PER HR: HCPCS

## 2018-02-26 PROCEDURE — 63710000001 OXYCODONE 5 MG TABLET: Performed by: OBSTETRICS & GYNECOLOGY

## 2018-02-26 PROCEDURE — 25010000002 NEOSTIGMINE 10 MG/10ML SOLUTION: Performed by: NURSE ANESTHETIST, CERTIFIED REGISTERED

## 2018-02-26 PROCEDURE — 86850 RBC ANTIBODY SCREEN: CPT | Performed by: OBSTETRICS & GYNECOLOGY

## 2018-02-26 PROCEDURE — 25010000002 FENTANYL CITRATE (PF) 100 MCG/2ML SOLUTION: Performed by: NURSE ANESTHETIST, CERTIFIED REGISTERED

## 2018-02-26 PROCEDURE — 86901 BLOOD TYPING SEROLOGIC RH(D): CPT | Performed by: OBSTETRICS & GYNECOLOGY

## 2018-02-26 PROCEDURE — 63710000001 LISINOPRIL 10 MG TABLET: Performed by: OBSTETRICS & GYNECOLOGY

## 2018-02-26 PROCEDURE — 25010000002 ONDANSETRON PER 1 MG: Performed by: NURSE ANESTHETIST, CERTIFIED REGISTERED

## 2018-02-26 PROCEDURE — 86901 BLOOD TYPING SEROLOGIC RH(D): CPT

## 2018-02-26 PROCEDURE — 86900 BLOOD TYPING SEROLOGIC ABO: CPT

## 2018-02-26 PROCEDURE — 58571 TLH W/T/O 250 G OR LESS: CPT | Performed by: OBSTETRICS & GYNECOLOGY

## 2018-02-26 PROCEDURE — 80053 COMPREHEN METABOLIC PANEL: CPT | Performed by: OBSTETRICS & GYNECOLOGY

## 2018-02-26 PROCEDURE — 88307 TISSUE EXAM BY PATHOLOGIST: CPT | Performed by: OBSTETRICS & GYNECOLOGY

## 2018-02-26 PROCEDURE — 63710000001 DOCUSATE SODIUM 100 MG CAPSULE: Performed by: OBSTETRICS & GYNECOLOGY

## 2018-02-26 PROCEDURE — 25010000002 DEXAMETHASONE PER 1 MG: Performed by: NURSE ANESTHETIST, CERTIFIED REGISTERED

## 2018-02-26 PROCEDURE — S0260 H&P FOR SURGERY: HCPCS | Performed by: OBSTETRICS & GYNECOLOGY

## 2018-02-26 PROCEDURE — 94799 UNLISTED PULMONARY SVC/PX: CPT

## 2018-02-26 PROCEDURE — 25010000002 PROPOFOL 10 MG/ML EMULSION: Performed by: NURSE ANESTHETIST, CERTIFIED REGISTERED

## 2018-02-26 PROCEDURE — 25010000002 CEFOXITIN PER 1 G: Performed by: OBSTETRICS & GYNECOLOGY

## 2018-02-26 RX ORDER — PROMETHAZINE HYDROCHLORIDE 25 MG/ML
12.5 INJECTION, SOLUTION INTRAMUSCULAR; INTRAVENOUS EVERY 6 HOURS PRN
Status: DISCONTINUED | OUTPATIENT
Start: 2018-02-26 | End: 2018-02-27 | Stop reason: HOSPADM

## 2018-02-26 RX ORDER — IBUPROFEN 600 MG/1
600 TABLET ORAL EVERY 6 HOURS PRN
Status: DISCONTINUED | OUTPATIENT
Start: 2018-02-26 | End: 2018-02-27 | Stop reason: HOSPADM

## 2018-02-26 RX ORDER — SODIUM CHLORIDE 9 MG/ML
INJECTION, SOLUTION INTRAVENOUS AS NEEDED
Status: DISCONTINUED | OUTPATIENT
Start: 2018-02-26 | End: 2018-02-26 | Stop reason: HOSPADM

## 2018-02-26 RX ORDER — CALCIUM CARBONATE 200(500)MG
1 TABLET,CHEWABLE ORAL 3 TIMES DAILY PRN
Status: DISCONTINUED | OUTPATIENT
Start: 2018-02-26 | End: 2018-02-27 | Stop reason: HOSPADM

## 2018-02-26 RX ORDER — PROMETHAZINE HYDROCHLORIDE 25 MG/ML
6.25 INJECTION, SOLUTION INTRAMUSCULAR; INTRAVENOUS ONCE AS NEEDED
Status: DISCONTINUED | OUTPATIENT
Start: 2018-02-26 | End: 2018-02-26 | Stop reason: HOSPADM

## 2018-02-26 RX ORDER — LABETALOL HYDROCHLORIDE 5 MG/ML
INJECTION, SOLUTION INTRAVENOUS AS NEEDED
Status: DISCONTINUED | OUTPATIENT
Start: 2018-02-26 | End: 2018-02-26 | Stop reason: SURG

## 2018-02-26 RX ORDER — NALOXONE HCL 0.4 MG/ML
0.4 VIAL (ML) INJECTION AS NEEDED
Status: DISCONTINUED | OUTPATIENT
Start: 2018-02-26 | End: 2018-02-26 | Stop reason: HOSPADM

## 2018-02-26 RX ORDER — ACETAMINOPHEN 325 MG/1
650 TABLET ORAL ONCE
Status: COMPLETED | OUTPATIENT
Start: 2018-02-26 | End: 2018-02-26

## 2018-02-26 RX ORDER — PROPOFOL 10 MG/ML
VIAL (ML) INTRAVENOUS AS NEEDED
Status: DISCONTINUED | OUTPATIENT
Start: 2018-02-26 | End: 2018-02-26 | Stop reason: SURG

## 2018-02-26 RX ORDER — LIDOCAINE HYDROCHLORIDE 10 MG/ML
INJECTION, SOLUTION EPIDURAL; INFILTRATION; INTRACAUDAL; PERINEURAL AS NEEDED
Status: DISCONTINUED | OUTPATIENT
Start: 2018-02-26 | End: 2018-02-26 | Stop reason: SURG

## 2018-02-26 RX ORDER — LISINOPRIL 10 MG/1
10 TABLET ORAL
Status: DISCONTINUED | OUTPATIENT
Start: 2018-02-26 | End: 2018-02-27 | Stop reason: HOSPADM

## 2018-02-26 RX ORDER — PREGABALIN 75 MG/1
150 CAPSULE ORAL ONCE
Status: COMPLETED | OUTPATIENT
Start: 2018-02-26 | End: 2018-02-26

## 2018-02-26 RX ORDER — FENTANYL CITRATE 50 UG/ML
50 INJECTION, SOLUTION INTRAMUSCULAR; INTRAVENOUS
Status: DISCONTINUED | OUTPATIENT
Start: 2018-02-26 | End: 2018-02-26 | Stop reason: HOSPADM

## 2018-02-26 RX ORDER — FENTANYL CITRATE 50 UG/ML
INJECTION, SOLUTION INTRAMUSCULAR; INTRAVENOUS AS NEEDED
Status: DISCONTINUED | OUTPATIENT
Start: 2018-02-26 | End: 2018-02-26 | Stop reason: SURG

## 2018-02-26 RX ORDER — FAMOTIDINE 20 MG/1
20 TABLET, FILM COATED ORAL ONCE
Status: COMPLETED | OUTPATIENT
Start: 2018-02-26 | End: 2018-02-26

## 2018-02-26 RX ORDER — MAGNESIUM HYDROXIDE 1200 MG/15ML
LIQUID ORAL AS NEEDED
Status: DISCONTINUED | OUTPATIENT
Start: 2018-02-26 | End: 2018-02-26 | Stop reason: HOSPADM

## 2018-02-26 RX ORDER — PROMETHAZINE HYDROCHLORIDE 12.5 MG/1
12.5 SUPPOSITORY RECTAL EVERY 6 HOURS PRN
Status: DISCONTINUED | OUTPATIENT
Start: 2018-02-26 | End: 2018-02-27 | Stop reason: HOSPADM

## 2018-02-26 RX ORDER — DOCUSATE SODIUM 100 MG/1
200 CAPSULE, LIQUID FILLED ORAL 2 TIMES DAILY
Status: DISCONTINUED | OUTPATIENT
Start: 2018-02-26 | End: 2018-02-27 | Stop reason: HOSPADM

## 2018-02-26 RX ORDER — GLYCOPYRROLATE 0.2 MG/ML
INJECTION INTRAMUSCULAR; INTRAVENOUS AS NEEDED
Status: DISCONTINUED | OUTPATIENT
Start: 2018-02-26 | End: 2018-02-26 | Stop reason: SURG

## 2018-02-26 RX ORDER — PROMETHAZINE HYDROCHLORIDE 25 MG/1
25 TABLET ORAL ONCE AS NEEDED
Status: DISCONTINUED | OUTPATIENT
Start: 2018-02-26 | End: 2018-02-26 | Stop reason: HOSPADM

## 2018-02-26 RX ORDER — ONDANSETRON 2 MG/ML
INJECTION INTRAMUSCULAR; INTRAVENOUS AS NEEDED
Status: DISCONTINUED | OUTPATIENT
Start: 2018-02-26 | End: 2018-02-26 | Stop reason: SURG

## 2018-02-26 RX ORDER — LIDOCAINE HYDROCHLORIDE 40 MG/ML
SOLUTION TOPICAL AS NEEDED
Status: DISCONTINUED | OUTPATIENT
Start: 2018-02-26 | End: 2018-02-26 | Stop reason: SURG

## 2018-02-26 RX ORDER — FAMOTIDINE 20 MG/1
20 TABLET, FILM COATED ORAL 2 TIMES DAILY
Status: DISCONTINUED | OUTPATIENT
Start: 2018-02-26 | End: 2018-02-27 | Stop reason: HOSPADM

## 2018-02-26 RX ORDER — DEXAMETHASONE SODIUM PHOSPHATE 4 MG/ML
INJECTION, SOLUTION INTRA-ARTICULAR; INTRALESIONAL; INTRAMUSCULAR; INTRAVENOUS; SOFT TISSUE AS NEEDED
Status: DISCONTINUED | OUTPATIENT
Start: 2018-02-26 | End: 2018-02-26 | Stop reason: SURG

## 2018-02-26 RX ORDER — IPRATROPIUM BROMIDE AND ALBUTEROL SULFATE 2.5; .5 MG/3ML; MG/3ML
3 SOLUTION RESPIRATORY (INHALATION) ONCE AS NEEDED
Status: DISCONTINUED | OUTPATIENT
Start: 2018-02-26 | End: 2018-02-26 | Stop reason: HOSPADM

## 2018-02-26 RX ORDER — LABETALOL HYDROCHLORIDE 5 MG/ML
5 INJECTION, SOLUTION INTRAVENOUS
Status: DISCONTINUED | OUTPATIENT
Start: 2018-02-26 | End: 2018-02-26 | Stop reason: HOSPADM

## 2018-02-26 RX ORDER — SODIUM CHLORIDE, SODIUM LACTATE, POTASSIUM CHLORIDE, CALCIUM CHLORIDE 600; 310; 30; 20 MG/100ML; MG/100ML; MG/100ML; MG/100ML
9 INJECTION, SOLUTION INTRAVENOUS CONTINUOUS
Status: DISCONTINUED | OUTPATIENT
Start: 2018-02-26 | End: 2018-02-26

## 2018-02-26 RX ORDER — ONDANSETRON 4 MG/1
4 TABLET, FILM COATED ORAL EVERY 6 HOURS PRN
Status: DISCONTINUED | OUTPATIENT
Start: 2018-02-26 | End: 2018-02-27 | Stop reason: HOSPADM

## 2018-02-26 RX ORDER — OXYCODONE HYDROCHLORIDE 5 MG/1
5 TABLET ORAL EVERY 4 HOURS PRN
Status: DISCONTINUED | OUTPATIENT
Start: 2018-02-26 | End: 2018-02-27 | Stop reason: HOSPADM

## 2018-02-26 RX ORDER — BUPIVACAINE HYDROCHLORIDE AND EPINEPHRINE 5; 5 MG/ML; UG/ML
INJECTION, SOLUTION PERINEURAL AS NEEDED
Status: DISCONTINUED | OUTPATIENT
Start: 2018-02-26 | End: 2018-02-26 | Stop reason: HOSPADM

## 2018-02-26 RX ORDER — CELECOXIB 200 MG/1
200 CAPSULE ORAL ONCE
Status: COMPLETED | OUTPATIENT
Start: 2018-02-26 | End: 2018-02-26

## 2018-02-26 RX ORDER — TEMAZEPAM 7.5 MG/1
7.5 CAPSULE ORAL NIGHTLY PRN
Status: DISCONTINUED | OUTPATIENT
Start: 2018-02-26 | End: 2018-02-27 | Stop reason: HOSPADM

## 2018-02-26 RX ORDER — ONDANSETRON 2 MG/ML
4 INJECTION INTRAMUSCULAR; INTRAVENOUS EVERY 6 HOURS PRN
Status: DISCONTINUED | OUTPATIENT
Start: 2018-02-26 | End: 2018-02-27 | Stop reason: HOSPADM

## 2018-02-26 RX ORDER — SODIUM CHLORIDE, SODIUM LACTATE, POTASSIUM CHLORIDE, CALCIUM CHLORIDE 600; 310; 30; 20 MG/100ML; MG/100ML; MG/100ML; MG/100ML
100 INJECTION, SOLUTION INTRAVENOUS CONTINUOUS
Status: DISCONTINUED | OUTPATIENT
Start: 2018-02-26 | End: 2018-02-26

## 2018-02-26 RX ORDER — LIDOCAINE HYDROCHLORIDE 10 MG/ML
0.5 INJECTION, SOLUTION EPIDURAL; INFILTRATION; INTRACAUDAL; PERINEURAL ONCE AS NEEDED
Status: COMPLETED | OUTPATIENT
Start: 2018-02-26 | End: 2018-02-26

## 2018-02-26 RX ORDER — ACETAMINOPHEN 325 MG/1
650 TABLET ORAL EVERY 6 HOURS SCHEDULED
Status: DISCONTINUED | OUTPATIENT
Start: 2018-02-26 | End: 2018-02-27 | Stop reason: HOSPADM

## 2018-02-26 RX ORDER — OXYCODONE HYDROCHLORIDE 5 MG/1
10 TABLET ORAL EVERY 4 HOURS PRN
Status: DISCONTINUED | OUTPATIENT
Start: 2018-02-26 | End: 2018-02-27 | Stop reason: HOSPADM

## 2018-02-26 RX ORDER — NEOSTIGMINE METHYLSULFATE 1 MG/ML
INJECTION, SOLUTION INTRAVENOUS AS NEEDED
Status: DISCONTINUED | OUTPATIENT
Start: 2018-02-26 | End: 2018-02-26 | Stop reason: SURG

## 2018-02-26 RX ORDER — HYDROMORPHONE HYDROCHLORIDE 1 MG/ML
0.5 INJECTION, SOLUTION INTRAMUSCULAR; INTRAVENOUS; SUBCUTANEOUS
Status: DISCONTINUED | OUTPATIENT
Start: 2018-02-26 | End: 2018-02-26 | Stop reason: HOSPADM

## 2018-02-26 RX ORDER — PROMETHAZINE HYDROCHLORIDE 25 MG/1
25 SUPPOSITORY RECTAL ONCE AS NEEDED
Status: DISCONTINUED | OUTPATIENT
Start: 2018-02-26 | End: 2018-02-26 | Stop reason: HOSPADM

## 2018-02-26 RX ORDER — ENALAPRILAT 2.5 MG/2ML
1.25 INJECTION INTRAVENOUS EVERY 6 HOURS PRN
Status: DISCONTINUED | OUTPATIENT
Start: 2018-02-26 | End: 2018-02-27 | Stop reason: HOSPADM

## 2018-02-26 RX ORDER — ATRACURIUM BESYLATE 10 MG/ML
INJECTION, SOLUTION INTRAVENOUS AS NEEDED
Status: DISCONTINUED | OUTPATIENT
Start: 2018-02-26 | End: 2018-02-26 | Stop reason: SURG

## 2018-02-26 RX ORDER — PROMETHAZINE HYDROCHLORIDE 12.5 MG/1
12.5 TABLET ORAL EVERY 6 HOURS PRN
Status: DISCONTINUED | OUTPATIENT
Start: 2018-02-26 | End: 2018-02-27 | Stop reason: HOSPADM

## 2018-02-26 RX ORDER — SODIUM CHLORIDE, SODIUM LACTATE, POTASSIUM CHLORIDE, CALCIUM CHLORIDE 600; 310; 30; 20 MG/100ML; MG/100ML; MG/100ML; MG/100ML
75 INJECTION, SOLUTION INTRAVENOUS CONTINUOUS
Status: DISCONTINUED | OUTPATIENT
Start: 2018-02-26 | End: 2018-02-27 | Stop reason: HOSPADM

## 2018-02-26 RX ORDER — BUPIVACAINE HYDROCHLORIDE 5 MG/ML
INJECTION, SOLUTION PERINEURAL AS NEEDED
Status: DISCONTINUED | OUTPATIENT
Start: 2018-02-26 | End: 2018-02-26 | Stop reason: HOSPADM

## 2018-02-26 RX ORDER — SODIUM CHLORIDE 0.9 % (FLUSH) 0.9 %
1-10 SYRINGE (ML) INJECTION AS NEEDED
Status: DISCONTINUED | OUTPATIENT
Start: 2018-02-26 | End: 2018-02-26 | Stop reason: HOSPADM

## 2018-02-26 RX ADMIN — LIDOCAINE HYDROCHLORIDE 1 EACH: 40 SOLUTION TOPICAL at 10:56

## 2018-02-26 RX ADMIN — LABETALOL HYDROCHLORIDE 5 MG: 5 INJECTION, SOLUTION INTRAVENOUS at 11:28

## 2018-02-26 RX ADMIN — LIDOCAINE HYDROCHLORIDE 50 MG: 10 INJECTION, SOLUTION EPIDURAL; INFILTRATION; INTRACAUDAL; PERINEURAL at 10:54

## 2018-02-26 RX ADMIN — FENTANYL CITRATE 50 MCG: 50 INJECTION, SOLUTION INTRAMUSCULAR; INTRAVENOUS at 10:54

## 2018-02-26 RX ADMIN — CELECOXIB 200 MG: 200 CAPSULE ORAL at 08:18

## 2018-02-26 RX ADMIN — OXYCODONE HYDROCHLORIDE 5 MG: 5 TABLET ORAL at 16:50

## 2018-02-26 RX ADMIN — ACETAMINOPHEN 650 MG: 325 TABLET, FILM COATED ORAL at 08:16

## 2018-02-26 RX ADMIN — SODIUM CHLORIDE, POTASSIUM CHLORIDE, SODIUM LACTATE AND CALCIUM CHLORIDE 75 ML/HR: 600; 310; 30; 20 INJECTION, SOLUTION INTRAVENOUS at 13:56

## 2018-02-26 RX ADMIN — EPHEDRINE SULFATE 10 MG: 50 INJECTION INTRAMUSCULAR; INTRAVENOUS; SUBCUTANEOUS at 11:01

## 2018-02-26 RX ADMIN — PREGABALIN 150 MG: 75 CAPSULE ORAL at 08:16

## 2018-02-26 RX ADMIN — ATRACURIUM BESYLATE 40 MG: 10 INJECTION, SOLUTION INTRAVENOUS at 10:54

## 2018-02-26 RX ADMIN — EPHEDRINE SULFATE 10 MG: 50 INJECTION INTRAMUSCULAR; INTRAVENOUS; SUBCUTANEOUS at 12:22

## 2018-02-26 RX ADMIN — ACETAMINOPHEN 650 MG: 325 TABLET, FILM COATED ORAL at 18:18

## 2018-02-26 RX ADMIN — NEOSTIGMINE METHYLSULFATE 2 MG: 1 INJECTION, SOLUTION INTRAVENOUS at 12:09

## 2018-02-26 RX ADMIN — CEFOXITIN 2 G: 2 INJECTION, POWDER, FOR SOLUTION INTRAVENOUS at 10:52

## 2018-02-26 RX ADMIN — PROPOFOL 150 MG: 10 INJECTION, EMULSION INTRAVENOUS at 10:54

## 2018-02-26 RX ADMIN — SODIUM CHLORIDE, POTASSIUM CHLORIDE, SODIUM LACTATE AND CALCIUM CHLORIDE: 600; 310; 30; 20 INJECTION, SOLUTION INTRAVENOUS at 11:16

## 2018-02-26 RX ADMIN — FAMOTIDINE 20 MG: 20 TABLET, FILM COATED ORAL at 21:08

## 2018-02-26 RX ADMIN — DEXAMETHASONE SODIUM PHOSPHATE 8 MG: 4 INJECTION, SOLUTION INTRAMUSCULAR; INTRAVENOUS at 11:00

## 2018-02-26 RX ADMIN — LISINOPRIL 10 MG: 10 TABLET ORAL at 15:44

## 2018-02-26 RX ADMIN — FAMOTIDINE 20 MG: 20 TABLET, FILM COATED ORAL at 08:16

## 2018-02-26 RX ADMIN — ONDANSETRON 4 MG: 2 INJECTION INTRAMUSCULAR; INTRAVENOUS at 12:07

## 2018-02-26 RX ADMIN — GLYCOPYRROLATE 0.4 MG: 0.2 INJECTION, SOLUTION INTRAMUSCULAR; INTRAVENOUS at 12:09

## 2018-02-26 RX ADMIN — EPHEDRINE SULFATE 10 MG: 50 INJECTION INTRAMUSCULAR; INTRAVENOUS; SUBCUTANEOUS at 11:17

## 2018-02-26 RX ADMIN — SODIUM CHLORIDE, POTASSIUM CHLORIDE, SODIUM LACTATE AND CALCIUM CHLORIDE 100 ML/HR: 600; 310; 30; 20 INJECTION, SOLUTION INTRAVENOUS at 08:17

## 2018-02-26 RX ADMIN — GLYCOPYRROLATE 0.1 MG: 0.2 INJECTION, SOLUTION INTRAMUSCULAR; INTRAVENOUS at 11:02

## 2018-02-26 RX ADMIN — LIDOCAINE HYDROCHLORIDE 0.5 ML: 10 INJECTION, SOLUTION EPIDURAL; INFILTRATION; INTRACAUDAL; PERINEURAL at 08:16

## 2018-02-26 RX ADMIN — DOCUSATE SODIUM 200 MG: 100 CAPSULE, LIQUID FILLED ORAL at 21:08

## 2018-02-26 RX ADMIN — PROPOFOL 25 MCG/KG/MIN: 10 INJECTION, EMULSION INTRAVENOUS at 11:00

## 2018-02-26 NOTE — ANESTHESIA PREPROCEDURE EVALUATION
Anesthesia Evaluation     Patient summary reviewed and Nursing notes reviewed                Airway   Mallampati: I  TM distance: >3 FB  Neck ROM: full  no difficulty expected  Dental      Pulmonary    (+) a smoker,   Cardiovascular - negative cardio ROS    ECG reviewed        Neuro/Psych- negative ROS  GI/Hepatic/Renal/Endo - negative ROS     Musculoskeletal (-) negative ROS    Abdominal    Substance History - negative use     OB/GYN negative ob/gyn ROS         Other                        Anesthesia Plan    ASA 2     general     intravenous induction     Plan discussed with CRNA.

## 2018-02-26 NOTE — ANESTHESIA PROCEDURE NOTES
Airway  Urgency: elective    Airway not difficult    General Information and Staff    Patient location during procedure: OR  CRNA: EDIL BRITTON    Indications and Patient Condition  Indications for airway management: airway protection    Preoxygenated: yes  MILS not maintained throughout  Mask difficulty assessment: 1 - vent by mask    Final Airway Details  Final airway type: endotracheal airway      Successful airway: ETT  Cuffed: yes   Successful intubation technique: direct laryngoscopy  Facilitating devices/methods: intubating stylet  Endotracheal tube insertion site: oral  Blade: Owens  Blade size: #2  ETT size: 6.5 mm  Cormack-Lehane Classification: grade I - full view of glottis  Placement verified by: chest auscultation and capnometry   Cuff volume (mL): 5  Measured from: lips  ETT to lips (cm): 20  Number of attempts at approach: 1    Additional Comments  Negative epigastric sounds, Breath sound equal bilaterally with symmetric chest rise and fall. Atraumatic, tube advanced with no resistance, dentition and mucosa unchanged

## 2018-02-26 NOTE — ANESTHESIA POSTPROCEDURE EVALUATION
Patient: Ro Walker    Procedure Summary     Date Anesthesia Start Anesthesia Stop Room / Location    02/26/18 1051   ANIL OR 01 / [facility]IL OR       Procedure Diagnosis Surgeon Provider    LAPAROSCOPIC ASSISTED VAGINAL HYSTERECTOMY, BILATERAL SALPINGO OOPHORECTOMY (N/A Abdomen) Carcinoma in situ of cervix, unspecified location  (Carcinoma in situ of cervix, unspecified location [D06.9]) MD Alec Price MD          Anesthesia Type: general  Last vitals  BP   153/84   Temp   98.3   Pulse   65   Resp   14   SpO2   98%     Post Anesthesia Care and Evaluation    Patient location during evaluation: PACU  Patient participation: complete - patient participated  Level of consciousness: awake and alert  Pain score: 0  Pain management: adequate  Airway patency: patent  Anesthetic complications: No anesthetic complications  PONV Status: none  Cardiovascular status: hemodynamically stable and acceptable  Respiratory status: nonlabored ventilation, acceptable and nasal cannula  Hydration status: acceptable

## 2018-02-27 VITALS
OXYGEN SATURATION: 97 % | HEART RATE: 64 BPM | TEMPERATURE: 97.6 F | HEIGHT: 60 IN | DIASTOLIC BLOOD PRESSURE: 69 MMHG | RESPIRATION RATE: 16 BRPM | SYSTOLIC BLOOD PRESSURE: 145 MMHG | BODY MASS INDEX: 23.95 KG/M2 | WEIGHT: 122 LBS

## 2018-02-27 PROBLEM — D06.9 CARCINOMA IN SITU OF CERVIX: Status: RESOLVED | Noted: 2018-01-26 | Resolved: 2018-02-27

## 2018-02-27 PROBLEM — D06.9 CIN III (CERVICAL INTRAEPITHELIAL NEOPLASIA GRADE III) WITH SEVERE DYSPLASIA: Status: RESOLVED | Noted: 2017-12-28 | Resolved: 2018-02-27

## 2018-02-27 LAB
ANION GAP SERPL CALCULATED.3IONS-SCNC: 3 MMOL/L (ref 3–11)
BASOPHILS # BLD AUTO: 0.01 10*3/MM3 (ref 0–0.2)
BASOPHILS NFR BLD AUTO: 0.1 % (ref 0–1)
BUN BLD-MCNC: 17 MG/DL (ref 9–23)
BUN/CREAT SERPL: 21.3 (ref 7–25)
CALCIUM SPEC-SCNC: 8.5 MG/DL (ref 8.7–10.4)
CHLORIDE SERPL-SCNC: 106 MMOL/L (ref 99–109)
CO2 SERPL-SCNC: 28 MMOL/L (ref 20–31)
CREAT BLD-MCNC: 0.8 MG/DL (ref 0.6–1.3)
DEPRECATED RDW RBC AUTO: 47.9 FL (ref 37–54)
EOSINOPHIL # BLD AUTO: 0 10*3/MM3 (ref 0–0.3)
EOSINOPHIL NFR BLD AUTO: 0 % (ref 0–3)
ERYTHROCYTE [DISTWIDTH] IN BLOOD BY AUTOMATED COUNT: 14.1 % (ref 11.3–14.5)
GFR SERPL CREATININE-BSD FRML MDRD: 71 ML/MIN/1.73
GLUCOSE BLD-MCNC: 100 MG/DL (ref 70–100)
HCT VFR BLD AUTO: 34.4 % (ref 34.5–44)
HGB BLD-MCNC: 10.9 G/DL (ref 11.5–15.5)
IMM GRANULOCYTES # BLD: 0.02 10*3/MM3 (ref 0–0.03)
IMM GRANULOCYTES NFR BLD: 0.2 % (ref 0–0.6)
LYMPHOCYTES # BLD AUTO: 1.71 10*3/MM3 (ref 0.6–4.8)
LYMPHOCYTES NFR BLD AUTO: 19.6 % (ref 24–44)
MCH RBC QN AUTO: 29.1 PG (ref 27–31)
MCHC RBC AUTO-ENTMCNC: 31.7 G/DL (ref 32–36)
MCV RBC AUTO: 92 FL (ref 80–99)
MONOCYTES # BLD AUTO: 0.46 10*3/MM3 (ref 0–1)
MONOCYTES NFR BLD AUTO: 5.3 % (ref 0–12)
NEUTROPHILS # BLD AUTO: 6.53 10*3/MM3 (ref 1.5–8.3)
NEUTROPHILS NFR BLD AUTO: 74.8 % (ref 41–71)
PLATELET # BLD AUTO: 100 10*3/MM3 (ref 150–450)
PMV BLD AUTO: 12.1 FL (ref 6–12)
POTASSIUM BLD-SCNC: 4.3 MMOL/L (ref 3.5–5.5)
RBC # BLD AUTO: 3.74 10*6/MM3 (ref 3.89–5.14)
SODIUM BLD-SCNC: 137 MMOL/L (ref 132–146)
WBC NRBC COR # BLD: 8.73 10*3/MM3 (ref 3.5–10.8)

## 2018-02-27 PROCEDURE — 25010000002 ENOXAPARIN PER 10 MG: Performed by: OBSTETRICS & GYNECOLOGY

## 2018-02-27 PROCEDURE — 63710000001 FAMOTIDINE 20 MG TABLET: Performed by: OBSTETRICS & GYNECOLOGY

## 2018-02-27 PROCEDURE — A9270 NON-COVERED ITEM OR SERVICE: HCPCS | Performed by: OBSTETRICS & GYNECOLOGY

## 2018-02-27 PROCEDURE — 63710000001 DOCUSATE SODIUM 100 MG CAPSULE: Performed by: OBSTETRICS & GYNECOLOGY

## 2018-02-27 PROCEDURE — 85025 COMPLETE CBC W/AUTO DIFF WBC: CPT | Performed by: OBSTETRICS & GYNECOLOGY

## 2018-02-27 PROCEDURE — 63710000001 ACETAMINOPHEN 325 MG TABLET: Performed by: OBSTETRICS & GYNECOLOGY

## 2018-02-27 PROCEDURE — 63710000001 LISINOPRIL 10 MG TABLET: Performed by: OBSTETRICS & GYNECOLOGY

## 2018-02-27 PROCEDURE — G0378 HOSPITAL OBSERVATION PER HR: HCPCS

## 2018-02-27 PROCEDURE — 80048 BASIC METABOLIC PNL TOTAL CA: CPT | Performed by: OBSTETRICS & GYNECOLOGY

## 2018-02-27 RX ORDER — ACETAMINOPHEN 325 MG/1
650 TABLET ORAL EVERY 6 HOURS SCHEDULED
Start: 2018-02-27 | End: 2018-03-14

## 2018-02-27 RX ORDER — ONDANSETRON 4 MG/1
4 TABLET, FILM COATED ORAL EVERY 6 HOURS PRN
Qty: 10 TABLET | Refills: 0
Start: 2018-02-27 | End: 2018-03-14

## 2018-02-27 RX ORDER — IBUPROFEN 600 MG/1
600 TABLET ORAL EVERY 6 HOURS PRN
Start: 2018-02-27 | End: 2020-06-25 | Stop reason: HOSPADM

## 2018-02-27 RX ORDER — DOCUSATE SODIUM 250 MG
250 CAPSULE ORAL 2 TIMES DAILY
Qty: 60 CAPSULE | Refills: 2
Start: 2018-02-27 | End: 2022-11-21

## 2018-02-27 RX ADMIN — FAMOTIDINE 20 MG: 20 TABLET, FILM COATED ORAL at 08:22

## 2018-02-27 RX ADMIN — DOCUSATE SODIUM 200 MG: 100 CAPSULE, LIQUID FILLED ORAL at 08:22

## 2018-02-27 RX ADMIN — ACETAMINOPHEN 650 MG: 325 TABLET, FILM COATED ORAL at 05:49

## 2018-02-27 RX ADMIN — ACETAMINOPHEN 650 MG: 325 TABLET, FILM COATED ORAL at 00:19

## 2018-02-27 RX ADMIN — ENOXAPARIN SODIUM 40 MG: 40 INJECTION SUBCUTANEOUS at 08:22

## 2018-02-27 RX ADMIN — LISINOPRIL 10 MG: 10 TABLET ORAL at 08:24

## 2018-03-01 ENCOUNTER — TELEPHONE (OUTPATIENT)
Dept: GYNECOLOGIC ONCOLOGY | Facility: CLINIC | Age: 68
End: 2018-03-01

## 2018-03-01 NOTE — TELEPHONE ENCOUNTER
MD Jazmin Price, KAYE                     pls notify pt of benign pathology.   Thx!       Phoned and spoke with pt's son Riley, informed him of benign results.  States his mom is doing the stool softener but not had any results yet, but is passing gas, not up moving as much as she should.  Instructed on Milk of Mg, needs to ambulate, drink plenty of fluids, call if needed.  Riley v/u, will call if needed.

## 2018-03-14 ENCOUNTER — OFFICE VISIT (OUTPATIENT)
Dept: GYNECOLOGIC ONCOLOGY | Facility: CLINIC | Age: 68
End: 2018-03-14

## 2018-03-14 VITALS
TEMPERATURE: 97.6 F | WEIGHT: 121 LBS | DIASTOLIC BLOOD PRESSURE: 67 MMHG | BODY MASS INDEX: 23.63 KG/M2 | RESPIRATION RATE: 16 BRPM | HEART RATE: 78 BPM | SYSTOLIC BLOOD PRESSURE: 144 MMHG | OXYGEN SATURATION: 97 %

## 2018-03-14 DIAGNOSIS — D06.1 CARCINOMA IN SITU OF EXOCERVIX: Primary | ICD-10-CM

## 2018-03-14 DIAGNOSIS — Z72.0 TOBACCO ABUSE: ICD-10-CM

## 2018-03-14 DIAGNOSIS — Z98.890 POST-OPERATIVE STATE: ICD-10-CM

## 2018-03-14 PROCEDURE — 99024 POSTOP FOLLOW-UP VISIT: CPT | Performed by: OBSTETRICS & GYNECOLOGY

## 2018-03-14 NOTE — PROGRESS NOTES
Ro Walker  [id number]  1950      Reason for Visit: Postoperative evaluation    History of Present Illness:  Patient is a very pleasant 67 y.o. woman who presents for a post operative evaluation status post LAVH, BSO performed on 2/26/18.      Surgery and hospital course were uncomplicated.  Today, patient notes normal bowel and bladder function.  Her pain is well controlled. She has questions about resuming normal activities.     Past Medical History, Past Surgical History, Social History, Family History have been reviewed and are without significant changes except as mentioned.    Review of Systems   All other systems were reviewed and are negative except as mentioned above.    Medications:  The current medication list was reviewed in the EMR    ALLERGIES:  No Known Allergies        /67   Pulse 78   Temp 97.6 °F (36.4 °C) (Temporal Artery )   Resp 16   Wt 54.9 kg (121 lb)   SpO2 97%   BMI 23.63 kg/m²        Physical Exam  Constitutional:  Patient is a pleasant woman in no acute distress.  Gastrointestinal: Abdomen is soft and appropriately tender.  There is no mass palpated.  There is no rebound or guarding.  Incision(s) is clean, dry and intact.  Extremities:  Bilateral lower extremities are non-tender.  Gynecologic:GYNECOLOGIC:  External genitalia are free from lesion. On speculum examination, the vaginal cuff was intact and no lesions were appreciated. Suture visualized at right apex, healthy granulation tissue visualized.         PATHOLOGY:  Cervix negative for in-situ and invasive carcinoma.  Cervix entirely submitted and sectioned at multiple levels.  Myometrium with adenomyosis.  Atrophic endometrium with small benign endometrial polyp.  Bilateral fallopian tubes and ovaries with benign peritubal cysts and benign cystic ovarian inclusions.  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  All pathology reports were given to patient. Discussed importance of following up with her gynecologist for pap smears and monitoring of vulvar dysplasia.       Overall, the patient is very pleased with her care.  I recommended continuation of post operative precautions as discussed.     She is to Return for on an as-needed basis.    Note: Speech recognition transcription software was used to dictate portions of this document.  An attempt at proofreading has been made though minor errors in transcription may still be present.  Please do not hesitate to call our office with any questions.      Patient was seen and examined with Dr. Brandt,  resident, who performed portions of the examination and documentation for this patient's care under my direct supervision.    María Dietrich MD  03/14/18  7:00 PM

## 2018-03-28 ENCOUNTER — TELEPHONE (OUTPATIENT)
Dept: GYNECOLOGIC ONCOLOGY | Facility: CLINIC | Age: 68
End: 2018-03-28

## 2018-03-28 NOTE — TELEPHONE ENCOUNTER
----- Message from Kameron Rutherford sent at 3/28/2018 10:42 AM EDT -----  Regarding: PT CONCERN  Contact: [phone]  Noticing a rash developing...please call........  Returned pt's call.  States noticed a rash to incision to  rlq 3 days ago, which she says has progressed since, and now starting to llq incision.  Her incisions are not open, no fever, no pain, but do itch.  Informed Dr. Dietrich, instructed her to do OTC local steroid cream, Benadryl for a couple of days scheduled while awake, Claritin or similar, call if not improving,will need to see.  Pt v/u, will do as instructed.

## 2020-06-13 ENCOUNTER — ANESTHESIA EVENT (OUTPATIENT)
Dept: PERIOP | Facility: HOSPITAL | Age: 70
End: 2020-06-13

## 2020-06-13 ENCOUNTER — ANESTHESIA (OUTPATIENT)
Dept: PERIOP | Facility: HOSPITAL | Age: 70
End: 2020-06-13

## 2020-06-13 ENCOUNTER — HOSPITAL ENCOUNTER (INPATIENT)
Facility: HOSPITAL | Age: 70
LOS: 6 days | Discharge: HOME | End: 2020-06-19
Attending: FAMILY MEDICINE | Admitting: SURGERY

## 2020-06-13 DIAGNOSIS — K40.90 HERNIA, INGUINAL, LEFT: ICD-10-CM

## 2020-06-13 PROBLEM — K46.0 INCARCERATED HERNIA: Status: ACTIVE | Noted: 2020-06-13

## 2020-06-13 PROBLEM — IMO0002 LUPUS: Status: ACTIVE | Noted: 2020-06-13

## 2020-06-13 PROBLEM — M32.9 LUPUS: Status: ACTIVE | Noted: 2020-06-13

## 2020-06-13 PROBLEM — Z85.028 HISTORY OF STOMACH CANCER: Status: ACTIVE | Noted: 2020-06-13

## 2020-06-13 PROBLEM — Z85.41 HISTORY OF CERVICAL CANCER: Status: ACTIVE | Noted: 2020-06-13

## 2020-06-13 PROBLEM — N17.9 AKI (ACUTE KIDNEY INJURY): Status: ACTIVE | Noted: 2020-06-13

## 2020-06-13 LAB
ALBUMIN SERPL-MCNC: 4.2 G/DL (ref 3.5–5.2)
ALBUMIN/GLOB SERPL: 1.2 G/DL
ALP SERPL-CCNC: 57 U/L (ref 39–117)
ALT SERPL W P-5'-P-CCNC: 12 U/L (ref 1–33)
ANION GAP SERPL CALCULATED.3IONS-SCNC: 18 MMOL/L (ref 5–15)
APTT PPP: 25 SECONDS (ref 24–37)
AST SERPL-CCNC: 28 U/L (ref 1–32)
BASOPHILS # BLD AUTO: 0.02 10*3/MM3 (ref 0–0.2)
BASOPHILS NFR BLD AUTO: 0.2 % (ref 0–1.5)
BILIRUB SERPL-MCNC: 0.5 MG/DL (ref 0.2–1.2)
BUN BLD-MCNC: 33 MG/DL (ref 8–23)
BUN/CREAT SERPL: 30.3 (ref 7–25)
CALCIUM SPEC-SCNC: 8.7 MG/DL (ref 8.6–10.5)
CHLORIDE SERPL-SCNC: 93 MMOL/L (ref 98–107)
CO2 SERPL-SCNC: 26 MMOL/L (ref 22–29)
CREAT BLD-MCNC: 1.09 MG/DL (ref 0.57–1)
D-LACTATE SERPL-SCNC: 1.1 MMOL/L (ref 0.5–2)
DEPRECATED RDW RBC AUTO: 42.5 FL (ref 37–54)
EOSINOPHIL # BLD AUTO: 0.15 10*3/MM3 (ref 0–0.4)
EOSINOPHIL NFR BLD AUTO: 1.6 % (ref 0.3–6.2)
ERYTHROCYTE [DISTWIDTH] IN BLOOD BY AUTOMATED COUNT: 12.8 % (ref 12.3–15.4)
GFR SERPL CREATININE-BSD FRML MDRD: 49 ML/MIN/1.73
GLOBULIN UR ELPH-MCNC: 3.5 GM/DL
GLUCOSE BLD-MCNC: 85 MG/DL (ref 65–99)
HCT VFR BLD AUTO: 42.3 % (ref 34–46.6)
HGB BLD-MCNC: 13.5 G/DL (ref 12–15.9)
IMM GRANULOCYTES # BLD AUTO: 0.03 10*3/MM3 (ref 0–0.05)
IMM GRANULOCYTES NFR BLD AUTO: 0.3 % (ref 0–0.5)
INR PPP: 1.02 (ref 0.85–1.16)
LYMPHOCYTES # BLD AUTO: 1.04 10*3/MM3 (ref 0.7–3.1)
LYMPHOCYTES NFR BLD AUTO: 10.9 % (ref 19.6–45.3)
MCH RBC QN AUTO: 29.1 PG (ref 26.6–33)
MCHC RBC AUTO-ENTMCNC: 31.9 G/DL (ref 31.5–35.7)
MCV RBC AUTO: 91.2 FL (ref 79–97)
MONOCYTES # BLD AUTO: 0.65 10*3/MM3 (ref 0.1–0.9)
MONOCYTES NFR BLD AUTO: 6.8 % (ref 5–12)
NEUTROPHILS # BLD AUTO: 7.68 10*3/MM3 (ref 1.7–7)
NEUTROPHILS NFR BLD AUTO: 80.2 % (ref 42.7–76)
NRBC BLD AUTO-RTO: 0 /100 WBC (ref 0–0.2)
PLATELET # BLD AUTO: 115 10*3/MM3 (ref 140–450)
PMV BLD AUTO: 12.3 FL (ref 6–12)
POTASSIUM BLD-SCNC: 4.2 MMOL/L (ref 3.5–5.2)
PROT SERPL-MCNC: 7.7 G/DL (ref 6–8.5)
PROTHROMBIN TIME: 13.1 SECONDS (ref 11.5–14)
RBC # BLD AUTO: 4.64 10*6/MM3 (ref 3.77–5.28)
SODIUM BLD-SCNC: 137 MMOL/L (ref 136–145)
WBC NRBC COR # BLD: 9.57 10*3/MM3 (ref 3.4–10.8)
WHOLE BLOOD HOLD SPECIMEN: NORMAL
WHOLE BLOOD HOLD SPECIMEN: NORMAL

## 2020-06-13 PROCEDURE — 85730 THROMBOPLASTIN TIME PARTIAL: CPT | Performed by: PHYSICIAN ASSISTANT

## 2020-06-13 PROCEDURE — 87635 SARS-COV-2 COVID-19 AMP PRB: CPT | Performed by: PHYSICIAN ASSISTANT

## 2020-06-13 PROCEDURE — 86923 COMPATIBILITY TEST ELECTRIC: CPT

## 2020-06-13 PROCEDURE — 85025 COMPLETE CBC W/AUTO DIFF WBC: CPT | Performed by: PHYSICIAN ASSISTANT

## 2020-06-13 PROCEDURE — 85610 PROTHROMBIN TIME: CPT | Performed by: PHYSICIAN ASSISTANT

## 2020-06-13 PROCEDURE — 83605 ASSAY OF LACTIC ACID: CPT | Performed by: PHYSICIAN ASSISTANT

## 2020-06-13 PROCEDURE — 86850 RBC ANTIBODY SCREEN: CPT | Performed by: PHYSICIAN ASSISTANT

## 2020-06-13 PROCEDURE — 80053 COMPREHEN METABOLIC PANEL: CPT | Performed by: PHYSICIAN ASSISTANT

## 2020-06-13 PROCEDURE — 99223 1ST HOSP IP/OBS HIGH 75: CPT | Performed by: INTERNAL MEDICINE

## 2020-06-13 PROCEDURE — 99221 1ST HOSP IP/OBS SF/LOW 40: CPT | Performed by: SURGERY

## 2020-06-13 PROCEDURE — 86901 BLOOD TYPING SEROLOGIC RH(D): CPT | Performed by: PHYSICIAN ASSISTANT

## 2020-06-13 PROCEDURE — 25010000002 HYDROMORPHONE PER 4 MG: Performed by: PHYSICIAN ASSISTANT

## 2020-06-13 PROCEDURE — 86900 BLOOD TYPING SEROLOGIC ABO: CPT | Performed by: PHYSICIAN ASSISTANT

## 2020-06-13 DEVICE — PROXIMATE LINEAR CUTTER RELOAD (STNADARD) , BLUE, 55MM
Type: IMPLANTABLE DEVICE | Status: FUNCTIONAL
Brand: PROXIMATE

## 2020-06-13 DEVICE — MESH ONFLEX W/PCKT ONLAY 8.6X14.2CM MD: Type: IMPLANTABLE DEVICE | Status: FUNCTIONAL

## 2020-06-13 DEVICE — PROXIMATE RELOADABLE LINEAR CUTTER WITH SAFETY LOCK-OUT.  55MM LINEAR CUTTER.
Type: IMPLANTABLE DEVICE | Status: FUNCTIONAL
Brand: PROXIMATE

## 2020-06-13 RX ORDER — HYDROMORPHONE HYDROCHLORIDE 1 MG/ML
0.25 INJECTION, SOLUTION INTRAMUSCULAR; INTRAVENOUS; SUBCUTANEOUS
Status: ACTIVE | OUTPATIENT
Start: 2020-06-13 | End: 2020-06-14

## 2020-06-13 RX ORDER — NALOXONE HCL 0.4 MG/ML
0.4 VIAL (ML) INJECTION
Status: DISCONTINUED | OUTPATIENT
Start: 2020-06-13 | End: 2020-06-13

## 2020-06-13 RX ORDER — ACETAMINOPHEN 160 MG/5ML
650 SOLUTION ORAL EVERY 4 HOURS PRN
Status: DISCONTINUED | OUTPATIENT
Start: 2020-06-13 | End: 2020-06-19 | Stop reason: HOSPADM

## 2020-06-13 RX ORDER — SODIUM CHLORIDE 9 MG/ML
125 INJECTION, SOLUTION INTRAVENOUS CONTINUOUS
Status: DISCONTINUED | OUTPATIENT
Start: 2020-06-13 | End: 2020-06-14

## 2020-06-13 RX ORDER — HYDROMORPHONE HYDROCHLORIDE 1 MG/ML
0.5 INJECTION, SOLUTION INTRAMUSCULAR; INTRAVENOUS; SUBCUTANEOUS
Status: DISCONTINUED | OUTPATIENT
Start: 2020-06-13 | End: 2020-06-14 | Stop reason: SDUPTHER

## 2020-06-13 RX ORDER — ACETAMINOPHEN 650 MG/1
650 SUPPOSITORY RECTAL EVERY 4 HOURS PRN
Status: DISCONTINUED | OUTPATIENT
Start: 2020-06-13 | End: 2020-06-19 | Stop reason: HOSPADM

## 2020-06-13 RX ORDER — FAMOTIDINE 10 MG/ML
20 INJECTION, SOLUTION INTRAVENOUS ONCE
Status: CANCELLED | OUTPATIENT
Start: 2020-06-14 | End: 2020-06-14

## 2020-06-13 RX ORDER — FENTANYL CITRATE 50 UG/ML
50 INJECTION, SOLUTION INTRAMUSCULAR; INTRAVENOUS
Status: DISCONTINUED | OUTPATIENT
Start: 2020-06-13 | End: 2020-06-14 | Stop reason: HOSPADM

## 2020-06-13 RX ORDER — HYDROMORPHONE HYDROCHLORIDE 1 MG/ML
0.25 INJECTION, SOLUTION INTRAMUSCULAR; INTRAVENOUS; SUBCUTANEOUS ONCE
Status: DISCONTINUED | OUTPATIENT
Start: 2020-06-14 | End: 2020-06-19 | Stop reason: HOSPADM

## 2020-06-13 RX ORDER — HYDROMORPHONE HYDROCHLORIDE 1 MG/ML
0.5 INJECTION, SOLUTION INTRAMUSCULAR; INTRAVENOUS; SUBCUTANEOUS
Status: DISCONTINUED | OUTPATIENT
Start: 2020-06-13 | End: 2020-06-14 | Stop reason: HOSPADM

## 2020-06-13 RX ORDER — SODIUM CHLORIDE 0.9 % (FLUSH) 0.9 %
10 SYRINGE (ML) INJECTION EVERY 12 HOURS SCHEDULED
Status: DISCONTINUED | OUTPATIENT
Start: 2020-06-13 | End: 2020-06-19 | Stop reason: HOSPADM

## 2020-06-13 RX ORDER — ONDANSETRON 2 MG/ML
4 INJECTION INTRAMUSCULAR; INTRAVENOUS EVERY 6 HOURS PRN
Status: DISCONTINUED | OUTPATIENT
Start: 2020-06-13 | End: 2020-06-14 | Stop reason: SDUPTHER

## 2020-06-13 RX ORDER — FAMOTIDINE 20 MG/1
20 TABLET, FILM COATED ORAL ONCE
Status: CANCELLED | OUTPATIENT
Start: 2020-06-14 | End: 2020-06-14

## 2020-06-13 RX ORDER — SODIUM CHLORIDE 0.9 % (FLUSH) 0.9 %
10 SYRINGE (ML) INJECTION EVERY 12 HOURS SCHEDULED
Status: CANCELLED | OUTPATIENT
Start: 2020-06-14

## 2020-06-13 RX ORDER — SODIUM CHLORIDE 0.9 % (FLUSH) 0.9 %
10 SYRINGE (ML) INJECTION AS NEEDED
Status: CANCELLED | OUTPATIENT
Start: 2020-06-13

## 2020-06-13 RX ORDER — SODIUM CHLORIDE, SODIUM LACTATE, POTASSIUM CHLORIDE, CALCIUM CHLORIDE 600; 310; 30; 20 MG/100ML; MG/100ML; MG/100ML; MG/100ML
9 INJECTION, SOLUTION INTRAVENOUS CONTINUOUS
Status: CANCELLED | OUTPATIENT
Start: 2020-06-14

## 2020-06-13 RX ORDER — SODIUM CHLORIDE 0.9 % (FLUSH) 0.9 %
10 SYRINGE (ML) INJECTION AS NEEDED
Status: DISCONTINUED | OUTPATIENT
Start: 2020-06-13 | End: 2020-06-19 | Stop reason: HOSPADM

## 2020-06-13 RX ORDER — LIDOCAINE HYDROCHLORIDE 10 MG/ML
0.5 INJECTION, SOLUTION EPIDURAL; INFILTRATION; INTRACAUDAL; PERINEURAL ONCE AS NEEDED
Status: CANCELLED | OUTPATIENT
Start: 2020-06-13

## 2020-06-13 RX ORDER — ACETAMINOPHEN 325 MG/1
650 TABLET ORAL EVERY 4 HOURS PRN
Status: DISCONTINUED | OUTPATIENT
Start: 2020-06-13 | End: 2020-06-19 | Stop reason: HOSPADM

## 2020-06-13 RX ORDER — HYDROMORPHONE HYDROCHLORIDE 1 MG/ML
0.25 INJECTION, SOLUTION INTRAMUSCULAR; INTRAVENOUS; SUBCUTANEOUS
Status: DISCONTINUED | OUTPATIENT
Start: 2020-06-13 | End: 2020-06-13

## 2020-06-13 RX ORDER — NALOXONE HCL 0.4 MG/ML
0.4 VIAL (ML) INJECTION
Status: DISCONTINUED | OUTPATIENT
Start: 2020-06-13 | End: 2020-06-19 | Stop reason: HOSPADM

## 2020-06-13 RX ORDER — HYDROMORPHONE HYDROCHLORIDE 1 MG/ML
0.5 INJECTION, SOLUTION INTRAMUSCULAR; INTRAVENOUS; SUBCUTANEOUS
Status: DISCONTINUED | OUTPATIENT
Start: 2020-06-13 | End: 2020-06-13

## 2020-06-13 RX ADMIN — SODIUM CHLORIDE 125 ML/HR: 9 INJECTION, SOLUTION INTRAVENOUS at 23:24

## 2020-06-13 RX ADMIN — HYDROMORPHONE HYDROCHLORIDE 0.25 MG: 1 INJECTION, SOLUTION INTRAMUSCULAR; INTRAVENOUS; SUBCUTANEOUS at 23:26

## 2020-06-13 RX ADMIN — SODIUM CHLORIDE, PRESERVATIVE FREE 10 ML: 5 INJECTION INTRAVENOUS at 23:35

## 2020-06-13 NOTE — NURSING NOTE
ACC REVIEW REPORT: [facility]        PATIENT NAME: Ro Walker    PATIENT ID: [id number]        COVID-19 ACC SCREENING       DOES THE PATIENT HAVE A FEVER GREATER THAN OR EQUAL .4:  No 98.2    IS THE PATIENT EXPERIENCING SHORTNESS OF BREATH: NONE REPORTED    DOES THE PATIENT HAVE A COUGH: NONE REPORTED    DOES THE PATIENT HAVE ANY OF THE FOLLOWING RISK FACTORS:    EXPOSURE TO SUSPECTED OR KNOWN COVID-19: NO    RECENT TRAVEL HISTORY TO ENDEMIC AREA (DOMESTIC/LOCAL): NO    IS THE PATIENT A HEALTHCARE WORKER: NO    HAS THE PATIENT BEEN TESTED FOR COVID-19: NO    DATE TESTED:     LAB TESTING SENT TO:           BED: S501    BED TYPE: TELE    BED GIVEN TO: BRO MERCEDES RN    TIME BED GIVEN: 1910    YOB: 1950    AGE: 70    GENDER: FEMALE    PREVIOUS ADMIT TO [facility]:     PREVIOUS ADMISSION DATE:     PATIENT CLASS:     TODAY'S DATE: 6/13/2020    TRANSFER DATE: 6/13/20    ETA: WILL CALL     TRANSFERRING FACILITY: [facility]     TRANSFERRING FACILITY PHONE # : [phone]    TRANSFERRING MD: DR FRAUSTO    DATE/TIME REQUEST RECEIVED: 5/13/20 @ 1836    [facility] RN: ROMEO BELL RN    REPORT FROM: BRO MERCEDES RN    TIME REPORT TAKEN: 1910    DIAGNOSIS: INCARCERATED STRANGULATED INGUINAL HERNIA    REASON FOR TRANSFER TO [facility]: HIGHER LEVEL OF CARE    TRANSPORTATION: LOCAL EMS    CLINICAL REASON FOR TRANSFER TO [facility]: PATIENT PRESENTED TO ER WITH COMPLAINTS OF DIZZINESS AND VOMITING X 3 DAYS    ALERT AND ORIENTED  RATES PAIN A 6 , NO PAIN MEDS RECEIVED    NO EXPOSER OR S/S OF COVID AND NOT TESTED AT [facility] FOR INCARCERATED INGUINAL HERNIA               CLINICAL INFORMATION        ALLERGIES: KNDA    VALDEZ: NO    INFECTIOUS DISEASE: NONE REPORTED    ISOLATION: NO      LAST VITAL SIGNS:  TEMP: 98.2  PULSE: 75  B/P: 148/61  RESP: 16  98.2    LAB INFORMATION: WBC 11.5, BUN 41, CREATININE 1.3, , URINE + KETONES, HYALIN CAST AND BACTERIA     MEDS/IV FLUIDS: # 22 RW, NS @ 125/HR  1 LITER NS  BOLUS, ZOFRAN      CARDIAC SYSTEM:    CHEST PAIN: NONE REPORTED        RESPIRATORY SYSTEM:    O2 SAT: 95% ROOM AIR      CNS/MUSCULOSKELETAL    ALERT AND ORIENTED: YES    GI//GY      ABDOMINAL PAIN: NONE REPORTED    VOMITING: YES    PAST MEDICAL HISTORY: LUPUS, STOMACH CANCER, EYE AND THROAT SURGERY            Cristal Abdi RN  6/13/2020  19:37

## 2020-06-14 PROBLEM — K46.0 INCARCERATED HERNIA: Status: RESOLVED | Noted: 2020-06-13 | Resolved: 2020-06-14

## 2020-06-14 PROBLEM — Z85.028 HISTORY OF STOMACH CANCER: Status: RESOLVED | Noted: 2020-06-13 | Resolved: 2020-06-14

## 2020-06-14 LAB
ABO GROUP BLD: NORMAL
ALBUMIN SERPL-MCNC: 3.5 G/DL (ref 3.5–5.2)
ALBUMIN/GLOB SERPL: 1.8 G/DL
ALP SERPL-CCNC: 32 U/L (ref 39–117)
ALT SERPL W P-5'-P-CCNC: 8 U/L (ref 1–33)
ANION GAP SERPL CALCULATED.3IONS-SCNC: 14 MMOL/L (ref 5–15)
AST SERPL-CCNC: 16 U/L (ref 1–32)
BASOPHILS # BLD MANUAL: 0 10*3/MM3 (ref 0–0.2)
BASOPHILS # BLD MANUAL: 0 10*3/MM3 (ref 0–0.2)
BASOPHILS NFR BLD AUTO: 0 % (ref 0–1.5)
BASOPHILS NFR BLD AUTO: 0 % (ref 0–1.5)
BILIRUB SERPL-MCNC: 0.7 MG/DL (ref 0.2–1.2)
BLD GP AB SCN SERPL QL: NEGATIVE
BUN BLD-MCNC: 33 MG/DL (ref 8–23)
BUN/CREAT SERPL: 34 (ref 7–25)
CALCIUM SPEC-SCNC: 7.5 MG/DL (ref 8.6–10.5)
CHLORIDE SERPL-SCNC: 99 MMOL/L (ref 98–107)
CO2 SERPL-SCNC: 24 MMOL/L (ref 22–29)
CREAT BLD-MCNC: 0.97 MG/DL (ref 0.57–1)
DEPRECATED RDW RBC AUTO: 43.9 FL (ref 37–54)
DEPRECATED RDW RBC AUTO: 44.1 FL (ref 37–54)
ELLIPTOCYTES BLD QL SMEAR: ABNORMAL
EOSINOPHIL # BLD MANUAL: 0 10*3/MM3 (ref 0–0.4)
EOSINOPHIL # BLD MANUAL: 0 10*3/MM3 (ref 0–0.4)
EOSINOPHIL NFR BLD MANUAL: 0 % (ref 0.3–6.2)
EOSINOPHIL NFR BLD MANUAL: 0 % (ref 0.3–6.2)
ERYTHROCYTE [DISTWIDTH] IN BLOOD BY AUTOMATED COUNT: 12.7 % (ref 12.3–15.4)
ERYTHROCYTE [DISTWIDTH] IN BLOOD BY AUTOMATED COUNT: 12.8 % (ref 12.3–15.4)
GFR SERPL CREATININE-BSD FRML MDRD: 56 ML/MIN/1.73
GLOBULIN UR ELPH-MCNC: 1.9 GM/DL
GLUCOSE BLD-MCNC: 111 MG/DL (ref 65–99)
HCT VFR BLD AUTO: 26.6 % (ref 34–46.6)
HCT VFR BLD AUTO: 27.1 % (ref 34–46.6)
HGB BLD-MCNC: 8.4 G/DL (ref 12–15.9)
HGB BLD-MCNC: 8.6 G/DL (ref 12–15.9)
HOLD SPECIMEN: NORMAL
HOLD SPECIMEN: NORMAL
IRON 24H UR-MRATE: 18 MCG/DL (ref 37–145)
LYMPHOCYTES # BLD MANUAL: 0.12 10*3/MM3 (ref 0.7–3.1)
LYMPHOCYTES # BLD MANUAL: 0.26 10*3/MM3 (ref 0.7–3.1)
LYMPHOCYTES NFR BLD MANUAL: 0 % (ref 5–12)
LYMPHOCYTES NFR BLD MANUAL: 2 % (ref 19.6–45.3)
LYMPHOCYTES NFR BLD MANUAL: 2 % (ref 5–12)
LYMPHOCYTES NFR BLD MANUAL: 5 % (ref 19.6–45.3)
MCH RBC QN AUTO: 29.8 PG (ref 26.6–33)
MCH RBC QN AUTO: 29.8 PG (ref 26.6–33)
MCHC RBC AUTO-ENTMCNC: 31.6 G/DL (ref 31.5–35.7)
MCHC RBC AUTO-ENTMCNC: 31.7 G/DL (ref 31.5–35.7)
MCV RBC AUTO: 93.8 FL (ref 79–97)
MCV RBC AUTO: 94.3 FL (ref 79–97)
MONOCYTES # BLD AUTO: 0 10*3/MM3 (ref 0.1–0.9)
MONOCYTES # BLD AUTO: 0.12 10*3/MM3 (ref 0.1–0.9)
NEUTROPHILS # BLD AUTO: 4.91 10*3/MM3 (ref 1.7–7)
NEUTROPHILS # BLD AUTO: 5.95 10*3/MM3 (ref 1.7–7)
NEUTROPHILS NFR BLD MANUAL: 63 % (ref 42.7–76)
NEUTROPHILS NFR BLD MANUAL: 76 % (ref 42.7–76)
NEUTS BAND NFR BLD MANUAL: 19 % (ref 0–5)
NEUTS BAND NFR BLD MANUAL: 33 % (ref 0–5)
PLAT MORPH BLD: NORMAL
PLAT MORPH BLD: NORMAL
PLATELET # BLD AUTO: 88 10*3/MM3 (ref 140–450)
PLATELET # BLD AUTO: 96 10*3/MM3 (ref 140–450)
PMV BLD AUTO: 12.2 FL (ref 6–12)
PMV BLD AUTO: 12.3 FL (ref 6–12)
POTASSIUM BLD-SCNC: 4.2 MMOL/L (ref 3.5–5.2)
PREALB SERPL-MCNC: 10 MG/DL (ref 20–40)
PROT SERPL-MCNC: 5.4 G/DL (ref 6–8.5)
RBC # BLD AUTO: 2.82 10*6/MM3 (ref 3.77–5.28)
RBC # BLD AUTO: 2.89 10*6/MM3 (ref 3.77–5.28)
RBC MORPH BLD: NORMAL
RH BLD: POSITIVE
SARS-COV-2 RNA RESP QL NAA+PROBE: NOT DETECTED
SODIUM BLD-SCNC: 137 MMOL/L (ref 136–145)
T&S EXPIRATION DATE: NORMAL
WBC MORPH BLD: NORMAL
WBC MORPH BLD: NORMAL
WBC NRBC COR # BLD: 5.17 10*3/MM3 (ref 3.4–10.8)
WBC NRBC COR # BLD: 6.2 10*3/MM3 (ref 3.4–10.8)

## 2020-06-14 PROCEDURE — 25010000002 DEXAMETHASONE PER 1 MG: Performed by: ANESTHESIOLOGY

## 2020-06-14 PROCEDURE — 25010000002 ENOXAPARIN PER 10 MG: Performed by: SURGERY

## 2020-06-14 PROCEDURE — C1781 MESH (IMPLANTABLE): HCPCS | Performed by: SURGERY

## 2020-06-14 PROCEDURE — 25010000002 NA FERRIC GLUC CPLX PER 12.5 MG: Performed by: SURGERY

## 2020-06-14 PROCEDURE — 25010000002 NEOSTIGMINE 10 MG/10ML SOLUTION: Performed by: ANESTHESIOLOGY

## 2020-06-14 PROCEDURE — 85025 COMPLETE CBC W/AUTO DIFF WBC: CPT | Performed by: SURGERY

## 2020-06-14 PROCEDURE — 0DT80ZZ RESECTION OF SMALL INTESTINE, OPEN APPROACH: ICD-10-PCS | Performed by: SURGERY

## 2020-06-14 PROCEDURE — 99233 SBSQ HOSP IP/OBS HIGH 50: CPT | Performed by: INTERNAL MEDICINE

## 2020-06-14 PROCEDURE — 49507 PRP I/HERN INIT BLOCK >5 YR: CPT | Performed by: SURGERY

## 2020-06-14 PROCEDURE — 85007 BL SMEAR W/DIFF WBC COUNT: CPT | Performed by: SURGERY

## 2020-06-14 PROCEDURE — 80053 COMPREHEN METABOLIC PANEL: CPT | Performed by: SURGERY

## 2020-06-14 PROCEDURE — 0YU60JZ SUPPLEMENT LEFT INGUINAL REGION WITH SYNTHETIC SUBSTITUTE, OPEN APPROACH: ICD-10-PCS | Performed by: SURGERY

## 2020-06-14 PROCEDURE — 25010000002 SUCCINYLCHOLINE PER 20 MG: Performed by: ANESTHESIOLOGY

## 2020-06-14 PROCEDURE — 25010000002 ALBUMIN HUMAN 5% PER 50 ML: Performed by: ANESTHESIOLOGY

## 2020-06-14 PROCEDURE — 25010000002 PHENYLEPHRINE PER 1 ML: Performed by: ANESTHESIOLOGY

## 2020-06-14 PROCEDURE — 25010000002 PIPERACILLIN SOD-TAZOBACTAM PER 1 G: Performed by: PHYSICIAN ASSISTANT

## 2020-06-14 PROCEDURE — 44120 REMOVAL OF SMALL INTESTINE: CPT | Performed by: PHYSICIAN ASSISTANT

## 2020-06-14 PROCEDURE — P9041 ALBUMIN (HUMAN),5%, 50ML: HCPCS | Performed by: ANESTHESIOLOGY

## 2020-06-14 PROCEDURE — 94799 UNLISTED PULMONARY SVC/PX: CPT

## 2020-06-14 PROCEDURE — 25010000002 PROPOFOL 10 MG/ML EMULSION: Performed by: ANESTHESIOLOGY

## 2020-06-14 PROCEDURE — 25010000002 PIPERACILLIN SOD-TAZOBACTAM PER 1 G: Performed by: ANESTHESIOLOGY

## 2020-06-14 PROCEDURE — 25010000002 HYDROMORPHONE PER 4 MG: Performed by: ANESTHESIOLOGY

## 2020-06-14 PROCEDURE — 25010000002 NA FERRIC GLUC CPLX PER 12.5 MG: Performed by: INTERNAL MEDICINE

## 2020-06-14 PROCEDURE — 94770: CPT

## 2020-06-14 PROCEDURE — 83540 ASSAY OF IRON: CPT | Performed by: SURGERY

## 2020-06-14 PROCEDURE — 99024 POSTOP FOLLOW-UP VISIT: CPT | Performed by: SURGERY

## 2020-06-14 PROCEDURE — 25010000002 ONDANSETRON PER 1 MG: Performed by: ANESTHESIOLOGY

## 2020-06-14 PROCEDURE — 84134 ASSAY OF PREALBUMIN: CPT | Performed by: SURGERY

## 2020-06-14 PROCEDURE — 49507 PRP I/HERN INIT BLOCK >5 YR: CPT | Performed by: PHYSICIAN ASSISTANT

## 2020-06-14 PROCEDURE — 25010000003 LIDOCAINE 1 % SOLUTION: Performed by: ANESTHESIOLOGY

## 2020-06-14 PROCEDURE — 88307 TISSUE EXAM BY PATHOLOGIST: CPT | Performed by: SURGERY

## 2020-06-14 PROCEDURE — 44120 REMOVAL OF SMALL INTESTINE: CPT | Performed by: SURGERY

## 2020-06-14 PROCEDURE — 25010000002 MORPHINE PER 10 MG: Performed by: SURGERY

## 2020-06-14 RX ORDER — ALBUMIN, HUMAN INJ 5% 5 %
SOLUTION INTRAVENOUS CONTINUOUS PRN
Status: DISCONTINUED | OUTPATIENT
Start: 2020-06-14 | End: 2020-06-14 | Stop reason: SURG

## 2020-06-14 RX ORDER — NALOXONE HCL 0.4 MG/ML
0.4 VIAL (ML) INJECTION
Status: DISCONTINUED | OUTPATIENT
Start: 2020-06-14 | End: 2020-06-19 | Stop reason: HOSPADM

## 2020-06-14 RX ORDER — LIDOCAINE HYDROCHLORIDE 10 MG/ML
INJECTION, SOLUTION INFILTRATION; PERINEURAL AS NEEDED
Status: DISCONTINUED | OUTPATIENT
Start: 2020-06-14 | End: 2020-06-14 | Stop reason: SURG

## 2020-06-14 RX ORDER — PROMETHAZINE HYDROCHLORIDE 25 MG/ML
12.5 INJECTION, SOLUTION INTRAMUSCULAR; INTRAVENOUS EVERY 6 HOURS PRN
Status: DISCONTINUED | OUTPATIENT
Start: 2020-06-14 | End: 2020-06-19 | Stop reason: HOSPADM

## 2020-06-14 RX ORDER — PROPOFOL 10 MG/ML
VIAL (ML) INTRAVENOUS AS NEEDED
Status: DISCONTINUED | OUTPATIENT
Start: 2020-06-14 | End: 2020-06-14 | Stop reason: SURG

## 2020-06-14 RX ORDER — LORAZEPAM 2 MG/ML
0.5 INJECTION INTRAMUSCULAR EVERY 12 HOURS PRN
Status: DISCONTINUED | OUTPATIENT
Start: 2020-06-14 | End: 2020-06-19 | Stop reason: HOSPADM

## 2020-06-14 RX ORDER — MORPHINE SULFATE 4 MG/ML
4 INJECTION, SOLUTION INTRAMUSCULAR; INTRAVENOUS
Status: DISCONTINUED | OUTPATIENT
Start: 2020-06-14 | End: 2020-06-19 | Stop reason: HOSPADM

## 2020-06-14 RX ORDER — NALOXONE HCL 0.4 MG/ML
0.1 VIAL (ML) INJECTION
Status: DISCONTINUED | OUTPATIENT
Start: 2020-06-14 | End: 2020-06-19 | Stop reason: HOSPADM

## 2020-06-14 RX ORDER — ONDANSETRON 2 MG/ML
4 INJECTION INTRAMUSCULAR; INTRAVENOUS EVERY 6 HOURS PRN
Status: ACTIVE | OUTPATIENT
Start: 2020-06-14 | End: 2020-06-18

## 2020-06-14 RX ORDER — ATRACURIUM BESYLATE 10 MG/ML
INJECTION, SOLUTION INTRAVENOUS AS NEEDED
Status: DISCONTINUED | OUTPATIENT
Start: 2020-06-14 | End: 2020-06-14 | Stop reason: SURG

## 2020-06-14 RX ORDER — SODIUM CHLORIDE, SODIUM LACTATE, POTASSIUM CHLORIDE, CALCIUM CHLORIDE 600; 310; 30; 20 MG/100ML; MG/100ML; MG/100ML; MG/100ML
INJECTION, SOLUTION INTRAVENOUS CONTINUOUS PRN
Status: DISCONTINUED | OUTPATIENT
Start: 2020-06-14 | End: 2020-06-14 | Stop reason: SURG

## 2020-06-14 RX ORDER — HYDROMORPHONE HCL 110MG/55ML
PATIENT CONTROLLED ANALGESIA SYRINGE INTRAVENOUS AS NEEDED
Status: DISCONTINUED | OUTPATIENT
Start: 2020-06-14 | End: 2020-06-14 | Stop reason: SURG

## 2020-06-14 RX ORDER — SODIUM CHLORIDE, SODIUM LACTATE, POTASSIUM CHLORIDE, CALCIUM CHLORIDE 600; 310; 30; 20 MG/100ML; MG/100ML; MG/100ML; MG/100ML
150 INJECTION, SOLUTION INTRAVENOUS CONTINUOUS
Status: DISCONTINUED | OUTPATIENT
Start: 2020-06-14 | End: 2020-06-15

## 2020-06-14 RX ORDER — DIPHENHYDRAMINE HYDROCHLORIDE 50 MG/ML
25 INJECTION INTRAMUSCULAR; INTRAVENOUS EVERY 4 HOURS PRN
Status: DISCONTINUED | OUTPATIENT
Start: 2020-06-14 | End: 2020-06-19 | Stop reason: HOSPADM

## 2020-06-14 RX ORDER — SIMETHICONE 80 MG
80 TABLET,CHEWABLE ORAL 4 TIMES DAILY PRN
Status: DISCONTINUED | OUTPATIENT
Start: 2020-06-14 | End: 2020-06-19 | Stop reason: HOSPADM

## 2020-06-14 RX ORDER — SUCCINYLCHOLINE CHLORIDE 20 MG/ML
INJECTION INTRAMUSCULAR; INTRAVENOUS AS NEEDED
Status: DISCONTINUED | OUTPATIENT
Start: 2020-06-14 | End: 2020-06-14 | Stop reason: SURG

## 2020-06-14 RX ORDER — DEXAMETHASONE SODIUM PHOSPHATE 4 MG/ML
INJECTION, SOLUTION INTRA-ARTICULAR; INTRALESIONAL; INTRAMUSCULAR; INTRAVENOUS; SOFT TISSUE AS NEEDED
Status: DISCONTINUED | OUTPATIENT
Start: 2020-06-14 | End: 2020-06-14 | Stop reason: SURG

## 2020-06-14 RX ORDER — NEOSTIGMINE METHYLSULFATE 1 MG/ML
INJECTION, SOLUTION INTRAVENOUS AS NEEDED
Status: DISCONTINUED | OUTPATIENT
Start: 2020-06-14 | End: 2020-06-14 | Stop reason: SURG

## 2020-06-14 RX ORDER — ASPIRIN 325 MG
325 TABLET, DELAYED RELEASE (ENTERIC COATED) ORAL DAILY
Status: DISCONTINUED | OUTPATIENT
Start: 2020-06-14 | End: 2020-06-14

## 2020-06-14 RX ORDER — ALBUTEROL SULFATE 2.5 MG/3ML
2.5 SOLUTION RESPIRATORY (INHALATION)
Status: DISCONTINUED | OUTPATIENT
Start: 2020-06-14 | End: 2020-06-19 | Stop reason: HOSPADM

## 2020-06-14 RX ORDER — GLYCOPYRROLATE 0.2 MG/ML
INJECTION INTRAMUSCULAR; INTRAVENOUS AS NEEDED
Status: DISCONTINUED | OUTPATIENT
Start: 2020-06-14 | End: 2020-06-14 | Stop reason: SURG

## 2020-06-14 RX ORDER — ONDANSETRON 4 MG/1
4 TABLET, FILM COATED ORAL EVERY 6 HOURS PRN
Status: DISCONTINUED | OUTPATIENT
Start: 2020-06-18 | End: 2020-06-19 | Stop reason: HOSPADM

## 2020-06-14 RX ORDER — ONDANSETRON 2 MG/ML
INJECTION INTRAMUSCULAR; INTRAVENOUS AS NEEDED
Status: DISCONTINUED | OUTPATIENT
Start: 2020-06-14 | End: 2020-06-14 | Stop reason: SURG

## 2020-06-14 RX ORDER — SODIUM CHLORIDE 9 MG/ML
250 INJECTION, SOLUTION INTRAVENOUS CONTINUOUS
Status: ACTIVE | OUTPATIENT
Start: 2020-06-14 | End: 2020-06-14

## 2020-06-14 RX ORDER — PANTOPRAZOLE SODIUM 40 MG/10ML
40 INJECTION, POWDER, LYOPHILIZED, FOR SOLUTION INTRAVENOUS
Status: DISCONTINUED | OUTPATIENT
Start: 2020-06-14 | End: 2020-06-19 | Stop reason: HOSPADM

## 2020-06-14 RX ADMIN — LIDOCAINE HYDROCHLORIDE 50 MG: 10 INJECTION, SOLUTION INFILTRATION; PERINEURAL at 01:12

## 2020-06-14 RX ADMIN — PHENYLEPHRINE HYDROCHLORIDE 200 MCG: 10 INJECTION, SOLUTION INTRAMUSCULAR; INTRAVENOUS; SUBCUTANEOUS at 01:47

## 2020-06-14 RX ADMIN — ALBUTEROL SULFATE 2.5 MG: 2.5 SOLUTION RESPIRATORY (INHALATION) at 19:47

## 2020-06-14 RX ADMIN — TAZOBACTAM SODIUM AND PIPERACILLIN SODIUM 3.38 G: 375; 3 INJECTION, SOLUTION INTRAVENOUS at 01:12

## 2020-06-14 RX ADMIN — PANTOPRAZOLE SODIUM 40 MG: 40 INJECTION, POWDER, FOR SOLUTION INTRAVENOUS at 06:26

## 2020-06-14 RX ADMIN — MORPHINE SULFATE 4 MG: 4 INJECTION, SOLUTION INTRAMUSCULAR; INTRAVENOUS at 20:32

## 2020-06-14 RX ADMIN — ATRACURIUM BESYLATE 5 MG: 10 INJECTION, SOLUTION INTRAVENOUS at 01:12

## 2020-06-14 RX ADMIN — SODIUM CHLORIDE, POTASSIUM CHLORIDE, SODIUM LACTATE AND CALCIUM CHLORIDE 150 ML/HR: 600; 310; 30; 20 INJECTION, SOLUTION INTRAVENOUS at 18:00

## 2020-06-14 RX ADMIN — ALBUTEROL SULFATE 2.5 MG: 2.5 SOLUTION RESPIRATORY (INHALATION) at 06:52

## 2020-06-14 RX ADMIN — SODIUM CHLORIDE 125 MG: 9 INJECTION, SOLUTION INTRAVENOUS at 15:50

## 2020-06-14 RX ADMIN — ALBUMIN (HUMAN): 12.5 SOLUTION INTRAVENOUS at 02:13

## 2020-06-14 RX ADMIN — TAZOBACTAM SODIUM AND PIPERACILLIN SODIUM 3.38 G: 375; 3 INJECTION, SOLUTION INTRAVENOUS at 00:17

## 2020-06-14 RX ADMIN — SODIUM CHLORIDE, POTASSIUM CHLORIDE, SODIUM LACTATE AND CALCIUM CHLORIDE 150 ML/HR: 600; 310; 30; 20 INJECTION, SOLUTION INTRAVENOUS at 03:41

## 2020-06-14 RX ADMIN — PHENYLEPHRINE HYDROCHLORIDE 200 MCG: 10 INJECTION, SOLUTION INTRAMUSCULAR; INTRAVENOUS; SUBCUTANEOUS at 01:58

## 2020-06-14 RX ADMIN — DEXAMETHASONE SODIUM PHOSPHATE 4 MG: 4 INJECTION, SOLUTION INTRAMUSCULAR; INTRAVENOUS at 01:12

## 2020-06-14 RX ADMIN — PHENYLEPHRINE HYDROCHLORIDE 200 MCG: 10 INJECTION, SOLUTION INTRAMUSCULAR; INTRAVENOUS; SUBCUTANEOUS at 02:08

## 2020-06-14 RX ADMIN — ONDANSETRON 4 MG: 2 INJECTION INTRAMUSCULAR; INTRAVENOUS at 01:12

## 2020-06-14 RX ADMIN — SODIUM CHLORIDE, PRESERVATIVE FREE 10 ML: 5 INJECTION INTRAVENOUS at 20:32

## 2020-06-14 RX ADMIN — ALBUTEROL SULFATE 2.5 MG: 2.5 SOLUTION RESPIRATORY (INHALATION) at 12:57

## 2020-06-14 RX ADMIN — ENOXAPARIN SODIUM 40 MG: 40 INJECTION SUBCUTANEOUS at 06:25

## 2020-06-14 RX ADMIN — PROPOFOL 150 MG: 10 INJECTION, EMULSION INTRAVENOUS at 01:21

## 2020-06-14 RX ADMIN — PHENYLEPHRINE HYDROCHLORIDE 200 MCG: 10 INJECTION, SOLUTION INTRAMUSCULAR; INTRAVENOUS; SUBCUTANEOUS at 01:41

## 2020-06-14 RX ADMIN — GLYCOPYRROLATE 0.4 MG: 0.2 INJECTION INTRAMUSCULAR; INTRAVENOUS at 02:32

## 2020-06-14 RX ADMIN — SUCCINYLCHOLINE CHLORIDE 48.6 MG: 20 INJECTION, SOLUTION INTRAMUSCULAR; INTRAVENOUS; PARENTERAL at 01:12

## 2020-06-14 RX ADMIN — SODIUM CHLORIDE 250 ML/HR: 9 INJECTION, SOLUTION INTRAVENOUS at 13:28

## 2020-06-14 RX ADMIN — HYDROMORPHONE HYDROCHLORIDE 2 MG: 2 INJECTION, SOLUTION INTRAMUSCULAR; INTRAVENOUS; SUBCUTANEOUS at 01:12

## 2020-06-14 RX ADMIN — SODIUM CHLORIDE 125 MG: 9 INJECTION, SOLUTION INTRAVENOUS at 22:24

## 2020-06-14 RX ADMIN — SODIUM CHLORIDE, POTASSIUM CHLORIDE, SODIUM LACTATE AND CALCIUM CHLORIDE: 600; 310; 30; 20 INJECTION, SOLUTION INTRAVENOUS at 01:12

## 2020-06-14 RX ADMIN — NEOSTIGMINE 2.5 MG: 1 INJECTION INTRAVENOUS at 02:32

## 2020-06-14 NOTE — ANESTHESIA PREPROCEDURE EVALUATION
Anesthesia Evaluation     Patient summary reviewed and Nursing notes reviewed   no history of anesthetic complications:  NPO Solid Status: > 8 hours  NPO Liquid Status: > 8 hours           Airway   Mallampati: II  TM distance: >3 FB  Neck ROM: full  No difficulty expected  Dental      Pulmonary - negative pulmonary ROS and normal exam   Cardiovascular - negative cardio ROS and normal exam        Neuro/Psych- negative ROS  GI/Hepatic/Renal/Endo      Musculoskeletal     Abdominal    Substance History      OB/GYN          Other                        Anesthesia Plan    ASA 3 - emergent     general     intravenous induction     Anesthetic plan, all risks, benefits, and alternatives have been provided, discussed and informed consent has been obtained with: patient.

## 2020-06-14 NOTE — OP NOTE
"Preoperative diagnosis:    Incarcerated strangulated left inguinal hernia with associated small bowel obstruction     Postoperative diagnosis:  Same with ischemic knuckle of bowel and near perforation     Procedure:    Exploratory laparotomy with small bowel resection and preperitoneal left inguinal hernia repair with mesh     Surgeon: Huy Lara MD       Asst:   Eloisa Vela PA-C                                   Anesth:  DALLAS     EBL:  800 cc     Specimens:   Small bowel resection     Drains:  None     Counts:  Correct     Complications:  Venous bleeding presumably from the medial aspect of the femoral vein, 800 cc blood loss with temporary hemodynamic instability responded to fluid resuscitation.    Findings:   Knuckle of small bowel incarcerated in a left direct inguinal hernia with ischemia and near perforation.  Venous oozing likely medial femoral vein controlled with running 3-0 prolene suture.  Repaired with Modified Onflex lightweight mesh 3.4\"x5.6\"     Indications:  This is a  70-year-old white female smoker with lupus who presented with a 3-day history of a lump in her left groin that became progressively more tender and red and was associated with nausea and vomiting and no bowel movements or flatus.  She was seen in the [facility] emergency room and CT scan showed incarcerated small bowel and a left inguinal hernia and she was accepted in transfer for surgical intervention.  She arrived here after several hours hemodynamically stable.  Please see my consultation.  Risks benefits alternative therapies were discussed and she wished to proceed with exploratory laparotomy, possible bowel resection and left inguinal hernia repair possibly with mesh.     Operative Technique:       The patient was brought to the operating room and placed supine upon the operating room table.  She underwent uneventful general endotracheal rapid sequence anesthesia per the anesthesiology staff without any signs of " aspiration.  Nasogastric tube was placed with return of dark green material but not copious less than 100 cc.  She received IV Zosyn.  A Guerrero catheter was placed in her abdomen pubis and groins were prepped and draped in a sterile fashion with ChloraPrep, and Ioban was used as well.  The incarcerated hernia was above the inguinal ligament.  The peritoneal cavity was entered through a lower midline incision.  No peritonitis.  Only mild to moderately dilated bowel.  Immediately noted was an incarcerated loop of small bowel in the left inguinal canal.  One limb was moderately dilated and the other very much decompressed consistent with a small bowel obstruction.  The incarcerated small bowel could not be manually reduced even with gentle traction on the bowel and pressure on the overlying skin.  I gently spread the orifice with a Edna clamp and incarcerated strangulated bowel was reduced.  Immediately noted was a fair amount of venous blood in the beginning of a retroperitoneal hematoma.  The area was packed and pressure held with a Gomez.  No further bleeding noted at this time.  Blood loss at this time was probably around 50 cc.  The reduced small bowel appeared to be an ischemic knuckle with a thin green line of attenuated serosa that was near perforation.  There was no pus within the hernia sac or significant amount of cloudy fluid etc.  No foul odor appreciated.  Mesentery was cleaned off proximal and distal to the previously strangulated knuckle of small bowel and the bowel divided with a GUY 50 mm stapler with a blue load.  Underlying mesentery was divided between clamps ligating with 2-0 Vicryl suture and the specimen sent to pathology for permanent section.  A side to side small bowel anastomosis was then performed.  A bowel clamp was placed across both limbs several centimeters distally to minimize contamination and laparotomy pads placed around the intended anastomosis.  Enterotomies were made on the  antimesenteric border of each limb and a common lumen created with the GUY 50 mm stapler with a blue load.  The intervening enterotomy was closed in 2 layers using running 3-0 Vicryl suture.  The small mesenteric defect was closed with a figure-of-eight 2-0 silk suture.  The anastomosis was widely patent and proximal distended bowel could be easily milked into the distal bowel.  The area of previous venous bleeding was reinspected and appeared to have stopped.  The preperitoneal space was then developed on the left exposing Shane's ligament along its length and reducing the direct hernia sac.  The defect itself was quite small probably 2 x 3 cm or less.  Once again it was in the direct space just above Shane's ligament and medial to the epigastric vessels.  Laterally blunt dissection was used to expose the iliopubic tract.  The epigastric vessels were ligated between clamps ligating with 2-0 Vicryl suture.  At this time further venous oozing was noted.  Pressure was again held.  It was felt to possibly be from the medial aspect of the femoral vein.  It was controlled with a Satinsky clamp.  There did not seem to be significant uncontrolled bleeding but there was 400 cc of blood in the canister.  The patient became hypotensive and this responded to fluid resuscitation.  There was no bleeding with a Satinsky clamp in place.  The tissue within the Satinsky clamp was oversewn using running 3-0 Prolene suture with near complete hemostasis.  Further bleeding now easily controlled with a running 3-0 Prolene suture.  And hemostasis was excellent.  Anesthesia felt the blood loss was 800 cc.  The previous small retroperitoneal hematoma had not expanded at all.  A modified on flex light weight inguinal hernia mesh 3.4 inches x 5.6 inches was placed in the preperitoneal space over the inguinal canal covering the direct, indirect and femoral spaces.  It rested nicely nonetheless it was tacked to Shane's ligament with a couple  absorbable secure strap tacks.  Midline fascia and peritoneum were closed using running #1 PDS suture beginning at both ends and tying in the middle.  Subcutaneous tissue was irrigated with saline and skin was reapproximated with staples a dry sterile dressing was placed.  Patient was taken to recovery in stable condition.  I discussed the operative findings and blood loss with the possible need for future transfusion with her son Miguel Walekr at [phone].

## 2020-06-14 NOTE — PROGRESS NOTES
[facility] Medicine Services  PROGRESS NOTE    Patient Name: Ro Walker  : 1950  MRN: [id number]    Date of Admission: 2020  Primary Care Physician: Tracy Schrader MD    Subjective   Subjective     CC:  abd pain     HPI:  OR early this AM. States she is still in pain. Has NG that was placed in surgery. Denies N/V. No BM since .     Review of Systems  Gen- No fevers, chills  CV- No chest pain, palpitations  Resp- No cough, dyspnea  GI- No N/V/D, + abd pain     Objective   Objective     Vital Signs:   Temp:  [97.3 °F (36.3 °C)-99.6 °F (37.6 °C)] 98.6 °F (37 °C)  Heart Rate:  [] 75  Resp:  [16-18] 16  BP: (114-199)/(46-83) 129/51        Physical Exam:  Constitutional: No acute distress, awake, alert  HENT: NCAT, mucous membranes moist  Respiratory: Clear to auscultation bilaterally, respiratory effort normal   Cardiovascular: RRR, no murmurs, rubs, or gallops, palpable pedal pulses bilaterally  Gastrointestinal: Positive bowel sounds, soft, diffuse tenderness   Musculoskeletal: No bilateral ankle edema  Psychiatric: Appropriate affect, cooperative  Neurologic: Oriented x 3, strength symmetric in all extremities, Cranial Nerves grossly intact to confrontation, speech clear  Skin: No rashes    Results Reviewed:  Results from last 7 days   Lab Units 20  0619 20  2215   WBC 10*3/mm3 5.17 9.57   HEMOGLOBIN g/dL 8.4* 13.5   HEMATOCRIT % 26.6* 42.3   PLATELETS 10*3/mm3 88* 115*   INR   --  1.02     Results from last 7 days   Lab Units 20  0619 20  2304   SODIUM mmol/L 137 137   POTASSIUM mmol/L 4.2 4.2   CHLORIDE mmol/L 99 93*   CO2 mmol/L 24.0 26.0   BUN mg/dL 33* 33*   CREATININE mg/dL 0.97 1.09*   GLUCOSE mg/dL 111* 85   CALCIUM mg/dL 7.5* 8.7   ALT (SGPT) U/L 8 12   AST (SGOT) U/L 16 28     Estimated Creatinine Clearance: 40.1 mL/min (by C-G formula based on SCr of 0.97 mg/dL).    Microbiology Results Abnormal     Procedure Component Value -  Date/Time    COVID PRE-OP / PRE-PROCEDURE SCREENING ORDER (NO ISOLATION) - Swab, Nasopharynx [[id number]] Collected:  06/13/20 2334    Lab Status:  Final result Specimen:  Swab from Nasopharynx Updated:  06/14/20 0045    Narrative:       The following orders were created for panel order COVID PRE-OP / PRE-PROCEDURE SCREENING ORDER (NO ISOLATION) - Swab, Nasopharynx.  Procedure                               Abnormality         Status                     ---------                               -----------         ------                     COVID-19,CEPHEID,ANIL IN-...[[id number]]  Normal              Final result                 Please view results for these tests on the individual orders.    COVID-19,CEPHEID,ANIL IN-HOUSE(OR EMERGENT/ADD-ON),NP SWAB IN TRANSPORT MEDIA 3-4 HR TAT - Swab, Nasopharynx [[id number]]  (Normal) Collected:  06/13/20 2334    Lab Status:  Final result Specimen:  Swab from Nasopharynx Updated:  06/14/20 0045     COVID19 Not Detected          Imaging Results (Last 24 Hours)     ** No results found for the last 24 hours. **               I have reviewed the medications:  Scheduled Meds:  albuterol 2.5 mg Nebulization Q6H While Awake - RT   enoxaparin 40 mg Subcutaneous Q24H   HYDROmorphone 0.25 mg Intravenous Once   pantoprazole 40 mg Intravenous Q AM   [START ON 6/15/2020] piperacillin-tazobactam 3.375 g Intravenous Q8H   sodium chloride 10 mL Intravenous Q12H     Continuous Infusions:  lactated ringers 150 mL/hr Last Rate: 150 mL/hr (06/14/20 0341)   sodium chloride 125 mL/hr Last Rate: 125 mL/hr (06/13/20 2324)     PRN Meds:.•  acetaminophen **OR** acetaminophen **OR** acetaminophen  •  diphenhydrAMINE  •  [START ON 6/15/2020] ferric gluconate  •  HYDROmorphone **AND** naloxone  •  HYDROmorphone **AND** naloxone  •  LORazepam  •  Morphine **AND** naloxone  •  ondansetron **FOLLOWED BY** [START ON 6/18/2020] ondansetron  •  phenol  •  promethazine **OR** promethazine  •  simethicone  •  sodium  chloride    Assessment/Plan   Assessment & Plan     Active Hospital Problems    Diagnosis  POA   • **Incarcerated hernia [K46.0]  Unknown   • Lupus (CMS/HCC) [M32.9]  Unknown   • History of cervical cancer [Z85.41]  Not Applicable   • XI (acute kidney injury) (CMS/HCC) [N17.9]  Unknown   • Tobacco abuse [Z72.0]  Yes      Resolved Hospital Problems    Diagnosis Date Resolved POA   • History of stomach cancer [Z85.028] 06/14/2020 Unknown        Brief Hospital Course to date:  Ro Walker is a 70 y.o. female with past medical history of lupus on chronic Plaquenil chronic smoker, history of cervical cancer who presents to outside ER with complaints of nausea, vomiting, no bowel movement since 6/8, and left inguinal pain and swelling. Diagnosed with incarcerated inguinal hernia and taken to the OR 6/14.     Incarcerated inguinal hernia s/p OR 6/14   - OR with Dr. Lara 6/14  - PRN pain control; PPI   - Continue zosyn     SBO  - NG placed during surgery   - NG per surgery  - Continue fluids; NPO; ambulate as able; continue NG     Acute blood loss anemia   Iron deficiency   - Initial H/H 13.5; current 8.4 - likely hemo-concentrated on admission  - Venous oozing noted intra-op likely from medial femoral vein which was repaired   - repeat H/H stable; iron low; ferrlecit once per surgery recs     XI   - pre-renal; improved with fluids     Lupus with chronic immunosuppression on Plaquenil   - holding plaquenil; resume when appropriate and taking PO     History of stomach cancer  History of cervical cancer     Tobacco abuse       DVT Prophylaxis:  lovenox    Disposition: I expect the patient to be discharged TBD -- pending improvement     CODE STATUS:   Code Status and Medical Interventions:   Ordered at: 06/13/20 0471     Code Status:    CPR     Medical Interventions (Level of Support Prior to Arrest):    Full         Electronically signed by Luba Bradley DO, 06/14/20, 10:42.

## 2020-06-14 NOTE — ANESTHESIA PROCEDURE NOTES
Airway  Urgency: elective    Date/Time: 6/14/2020 1:13 AM  Airway not difficult    General Information and Staff    Patient location during procedure: OR    Indications and Patient Condition  Indications for airway management: airway protection    Preoxygenated: yes  MILS not maintained throughout  Mask difficulty assessment: 1 - vent by mask    Final Airway Details  Final airway type: endotracheal airway      Successful airway: ETT  Cuffed: yes   Successful intubation technique: direct laryngoscopy  Endotracheal tube insertion site: oral  Blade: Nuha  Blade size: 3  ETT size (mm): 7.0  Cormack-Lehane Classification: grade I - full view of glottis  Placement verified by: chest auscultation and capnometry   Measured from: lips  ETT/EBT  to lips (cm): 20  Number of attempts at approach: 1    Additional Comments  Negative epigastric sounds, Breath sound equal bilaterally with symmetric chest rise and fall

## 2020-06-14 NOTE — BRIEF OP NOTE
"COLON RESECTION SMALL BOWEL, INGUINAL HERNIA REPAIR  Progress Note    Ro Walker  6/13/2020 - 6/14/2020    Pre-op Diagnosis:   Incarcerated small bowel in left inguinal hernia with obstruction and ischemia       Post-Op Diagnosis Codes:     * Incarcerated left inguinal hernia [K40.30]     * Ischemic necrosis of small bowel (CMS/HCC) [K55.029]     * SBO (small bowel obstruction) (CMS/HCC) [K56.609]    Procedure/CPT® Codes:  CT REPAIR ING HERNIA,5+Y/O,HONG [28599]    Procedure(s):  COLON RESECTION SMALL BOWEL  INGUINAL HERNIA REPAIR WITH MESH     Surgeon(s):  Huy Lara MD    Anesthesia: * No anesthesia type entered *    Staff:   Circulator: Serena Gaines RN  Scrub Person: Noe Escobar  Assistant: Eloisa Vela PA-C  Other: Shana Kirkpatrick    Estimated Blood Loss: 800 cc    Urine Voided: * No values recorded between 6/14/2020  1:10 AM and 6/14/2020  2:29 AM *    Specimens:                A: small bowel resection          Drains: * No LDAs found *    Findings: knuckle of small bowel incarcerated in a left direct inguinal hernia with ischemia and near perforation.  Venous oozing likely medial femoral vein controlled with running 3-0 prolene suture.  Repaired with Modified Onflex lightweight mesh 3.4\"x5.6\"    Complications:  Venous bleeding presumably from the medial aspect of the femoral vein, 800 cc blood loss with temporary hemodynamic instability responded to fluid resuscitation.      Huy Lara MD     Date: 6/14/2020  Time: 02:34      "

## 2020-06-14 NOTE — ANESTHESIA POSTPROCEDURE EVALUATION
Patient: Ro Walker    Procedure Summary     Date:  06/14/20 Room / Location:   [facility] 02 /  [facility]    Anesthesia Start:  0110 Anesthesia Stop:  0239    Procedures:       COLON RESECTION SMALL BOWEL (Abdomen)      INGUINAL HERNIA REPAIR (Left Groin) Diagnosis:       Incarcerated left inguinal hernia      Ischemic necrosis of small bowel (CMS/HCC)      SBO (small bowel obstruction) (CMS/HCC)      (Incarcerated small bowel in left inguinal hernia with obstruction and ischemia)    Surgeon:  Huy Lara MD Provider:  Julian Loredo MD    Anesthesia Type:  general ASA Status:  3 - Emergent          Anesthesia Type: general    Vitals  No vitals data found for the desired time range.          Post Anesthesia Care and Evaluation    Patient location during evaluation: PACU  Patient participation: complete - patient participated  Level of consciousness: awake and alert  Pain score: 0  Pain management: adequate  Airway patency: patent  Anesthetic complications: No anesthetic complications  PONV Status: none  Cardiovascular status: hemodynamically stable and acceptable  Respiratory status: nonlabored ventilation, acceptable and nasal cannula  Hydration status: acceptable

## 2020-06-14 NOTE — CONSULTS
Cc:  Incarcerated left inguinal hernia    HPI: A 70-year-old white female longstanding smoker with lupus who noticed a bulge in her left groin about 3 days ago.  She never noticed a bulge there prior to that.  It was painless.  She was not sure what it was.  Over the last days she is developed nausea and vomiting and pain and redness over the left inguinal region.  She is status post laparoscopic assisted vaginal hysterectomy and bilateral salpingo-oophorectomy by Dr. 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  No chemotherapy or radiation therapy required.  She went to the [facility] emergency room and I spoke with the emergency room physician several hours ago who said her scan showed incarcerated bowel in the left groin and I agreed to accept her in transfer to the hospitalists' service.  The patient is somewhat of a difficult historian but she says they offered her what sounds like an NG tube and she declined.  She felt it was optional.  Her last bowel movement was Monday and she has not passed flatus for a few days.    Past medical/surgical history.  Longstanding tobacco abuse likely COPD 1 pack a day starting at age 15.  Lupus on Plaquenil.  Carcinoma in situ of the cervix as above status post laparoscopic-assisted PANFILO/BSO.  Status post bilateral tubal ligation.  x 4.  Constipation vitamin D deficiency.  She had some polyps removed from her vocal cords in [place] in the last couple of years, she says it was done under anesthesia.    Meds: Plaquenil, Colace, vitamin D, ibuprofen    NKDA    SH:  1 ppd x 55 years.  .  4 children.    ROS: o/w neg. no dysuria, weight loss, fever chills, night sweats or melena    PE: On exam she is pleasant and conversant and does not appear toxic at all.  She was actually sitting up on the sofa at the side of the bed.  She has an emesis tube and retched several times, but no emesis witnessed.  Temperature 98.8 pulse 70  respiration 16 blood pressure 135/71.  She is thin skin is tan and leathery.  Voice is hoarse.  No cervical adenopathy.  Lungs clear.  Heart regular rate and rhythm.  Abdomen soft with only mild distention she complains of tenderness to palpation but there is no guarding.  There is a 7 cm erythematous tender warm knot in her left groin that is not reducible.  I do not see an associated scar but she did have a laparoscopic assisted PANFILO/BSO as above.    Labs pending    Impression: Incarcerated left inguinal possible incisional incarcerated hernia with overlying erythema and by verbal communication with the ER physician and [facility] this is incarcerated small bowel with small bowel obstruction.  Patient does complain of nausea and vomiting and has had no bowel movement or flatus for a few days.  Mildly distended and no peritoneal signs.  Significant delay in transfer and I suspect she has a knuckle of dead bowel.  No signs of toxicity clinically, labs pending.     Recommendations: Risks, benefits, and alternatives were discussed and she wishes to proceed with emergency laparotomy, possible bowel resection, and inguinal/incisional hernia repair, possibly with mesh depending on if the bowel is viable, level of contamination, etc.  The OR has been aware for hours and will proceed once Covid testing returns.  High risk of recurrence with primary repair. Type and screen.  Higher risk of pulmonary complications with lupus and longstanding smoking history likely COPD.  Thank you

## 2020-06-14 NOTE — PROGRESS NOTES
"Cc: POD#1  \"mouth dry\"    Zosyn#1    She is alone in the room.  She said she is sore.  Her main complaint is that her mouth is dry.  I told her she lost significant blood during surgery and she may require transfusion and she was agreeable.  She was not aware she had a urinary catheter.  She says she has not been out of bed since surgery.  She does not have a spirometer.  She has not vomited.  Minimal from the NG with some bilious material in the tubing only nothing in the canister (although 725 cc recorded).  No bowel movement or flatus.  No complaints of left lower extremity pain or numbness.  No fever.  Pulse 75 respiration 16 blood pressure 129/51    she is no apparent distress but much paler than she was preoperatively lungs clear to auscultation.  Heart no tachycardia.  Abdomen is soft, minimally distended, appropriately tender.  Wound dressing dry.  Improved erythema in the left groin but .  Left lower extremity warm, no edema, same size as the right lower extremity.  SCDs in place bilaterally.    Iron 18.  Prealbumin pending.  CMP normal except for glucose of 111 BUN of 33 calcium 7.5 total protein 5.4 alkaline phosphatase 32 GFR is 56 white blood count 5.2 with 76 segs 5 lymphs 19 bands.  H&H 8.4 and 26.6.  Platelet count decreased 88 on Lovenox blood type O+.  Normal coags.    Impression: Postoperative day #1 open reduction of strangulated incarcerated left direct inguinal hernia with small bowel resection mesh repair complicated by significant blood loss presumably from the femoral vein.  No signs of active bleeding.  No tachycardia.  Iron deficiency.  Intravascular volume depleted with ATN, dry mouth.  Pale.  Normal white count but significant left shift, no evidence of infection, left groin cellulitis improving.  On Zosyn.  No apparent sequela in the left lower extremity -no edema, paresthesias, etc.  Low platelets on Lovenox.  Concern in this patient with lupus and heavy smoking " history that she has not been out of bed or received a spirometer as ordered, discussed with nursing staff.    Rec: Bolus IV fluids.  Recheck CBC (H/H and plts)  this afternoon.  May benefit from transfusion.  Out of bed, pulmonary toilet, VTE prophylaxis with SCDs and Lovenox (continue for now), around-the-clock albuterol aerosols as ordered postop.  JARED LOPEZ.  JARED Guerrero.  Clear liquid diet.  See orders

## 2020-06-14 NOTE — H&P
[facility] Medicine Services  HISTORY AND PHYSICAL    Patient Name: Ro Walker  : 1950  MRN: [id number]  Primary Care Physician: Tracy Schrader MD  Date of admission: 2020      Subjective   Subjective     Chief Complaint:  Transfer from [facility] for incarcerated inguinal hernia    HPI:  Ro Walker is a 70 y.o. female with past medical history of lupus on chronic Plaquenil chronic smoker, history of cervical cancer who presents to outside ER with complaints of nausea, vomiting, no bowel movement since , and left inguinal pain and swelling.    Patient states that she began feeling more fatigued than normal on .  She reports that that was also her last bowel movement.  She has been constipated since that time.  She also noticed that she was having increasing sensation of regurgitation/reflux of bile.  She reports increasing abdominal pain and swelling in the left lower quadrant/inguinal region over the next several days with increasing nausea and bilious vomiting.  She denies any fever or chills.  Denies any cough, shortness of air, exposure to COVID-19 positive patients, or any other symptoms.    Patient reports that her pain is currently 8 out of 10.  Is progressively worsened over the last several days prompting her visit to the ER.  CT imaging revealed incarcerated bowel within the left inguinal hernia and therefore patient was transferred to our facility for further evaluation.  WBC at outside facility noted to be 11,000, platelets slightly low at 129, creatinine of 1.3, BUN of 40, lactic acid normal at 1.2.    Review of Systems   Gen- No fevers, chills  CV- No chest pain, palpitations  Resp- No cough, dyspnea  GI-reports nausea, vomiting, constipation, reports abd pain        All other systems reviewed and are negative.     Personal History     Past Medical History:   Diagnosis Date   • Cervical cancer (CMS/HCC)    • Lupus (CMS/HCC)    • Polyp of vocal cord         Past Surgical History:   Procedure Laterality Date   • CATARACT EXTRACTION  2008   • CERVICAL CONE BIOPSY     • CERVICAL CONE BIOPSY     • LAPAROSCOPIC ASSISTED VAGINAL HYSTERECTOMY N/A 2/26/2018    Procedure: LAPAROSCOPIC ASSISTED VAGINAL HYSTERECTOMY, BILATERAL SALPINGO OOPHORECTOMY;  Surgeon: María Dietrich MD;  Location: [facility];  Service:    • RETINAL DETACHMENT REPAIR  2006   • THROAT SURGERY  2017   • TUBAL ABDOMINAL LIGATION         Family History: family history includes Cancer in her brother and brother; Stomach cancer in her sister. Otherwise pertinent FHx was reviewed and unremarkable.     Social History:  reports that she has been smoking cigarettes. She has been smoking about 0.50 packs per day. She has never used smokeless tobacco. She reports that she does not drink alcohol or use drugs.  Social History     Social History Narrative   • Not on file       Medications:    Available home medication information reviewed.  Medications Prior to Admission   Medication Sig Dispense Refill Last Dose   • cholecalciferol (VITAMIN D3) 1000 units tablet Take 1,000 Units by mouth Daily.   Taking   • hydroxychloroquine (PLAQUENIL) 200 MG tablet Take 100 mg by mouth Daily.   Taking   • ibuprofen (ADVIL,MOTRIN) 600 MG tablet Take 1 tablet by mouth Every 6 (Six) Hours As Needed for Mild Pain .   Taking   • docusate sodium (COLACE) 250 MG capsule Take 1 capsule by mouth 2 (Two) Times a Day. 60 capsule 2 Taking       No Known Allergies    Objective   Objective     Vital Signs:   Temp:  [98.8 °F (37.1 °C)] 98.8 °F (37.1 °C)  Heart Rate:  [70] 70  Resp:  [16] 16  BP: (135)/(71) 135/71        Physical Exam   Constitutional: Awake, alert  Eyes: PERRLA, sclerae anicteric, no conjunctival injection  HENT: NCAT, mucous membranes moist  Neck: Supple, no thyromegaly, no lymphadenopathy, trachea midline  Respiratory: Clear to auscultation bilaterally, nonlabored respirations   Cardiovascular: RRR, no murmurs, rubs, or  gallops, palpable pedal pulses bilaterally  Gastrointestinal: Positive bowel sounds, soft, moderate tenderness involving left inguinal region with distention/swelling and slight erythema of the skin superficially, nondistended  Musculoskeletal: No bilateral ankle edema, no clubbing or cyanosis to extremities  Psychiatric: Appropriate affect, cooperative  Neurologic: Oriented x 3, strength symmetric in all extremities, Cranial Nerves grossly intact to confrontation, speech clear  Skin: No rashes      Results Reviewed:  I have personally reviewed current lab and radiology data.    Results from last 7 days   Lab Units 06/13/20  2215   WBC 10*3/mm3 9.57   HEMOGLOBIN g/dL 13.5   HEMATOCRIT % 42.3   PLATELETS 10*3/mm3 115*   INR  1.02     Results from last 7 days   Lab Units 06/13/20  2304   SODIUM mmol/L 137   POTASSIUM mmol/L 4.2   CHLORIDE mmol/L 93*   CO2 mmol/L 26.0   BUN mg/dL 33*   CREATININE mg/dL 1.09*   GLUCOSE mg/dL 85   CALCIUM mg/dL 8.7   ALT (SGPT) U/L 12   AST (SGOT) U/L 28   LACTATE mmol/L 1.1     Estimated Creatinine Clearance: 35.7 mL/min (A) (by C-G formula based on SCr of 1.09 mg/dL (H)).  Brief Urine Lab Results     None        Imaging Results (Last 24 Hours)     ** No results found for the last 24 hours. **             Assessment/Plan   Assessment / Plan     Active Hospital Problems    Diagnosis POA   • **Incarcerated hernia [K46.0] Unknown   • Lupus (CMS/HCC) [M32.9] Unknown   • History of cervical cancer [Z85.41] Not Applicable   • History of stomach cancer [Z85.028] Unknown   • XI (acute kidney injury) (CMS/HCC) [N17.9] Unknown   • Tobacco abuse [Z72.0] Yes       1.  Incarcerated bowel within left inguinal hernia  2. XI, prerenal, Cr 1.3 at OSH, up from baseline 0.8  2.  Chronic immunosuppression on Plaquenil for lupus  3.  Intractable nausea and vomiting  4.  History of stomach cancer  5.  History of cervical cancer  6.  Chronic tobacco use    --N.p.o.  --NG tube per surgery if patient is  agreeable  --Dr. Lara evaluating at bedside, likely to go to the OR tonight  --Pain control  --IV fluids  --Antiemetics  --repeat labs  --zosyn preop and postop depending on state of bowel necrosis if any    DVT prophylaxis:  SCDs    CODE STATUS:    Code Status and Medical Interventions:   Ordered at: 06/13/20 4834     Code Status:    CPR     Medical Interventions (Level of Support Prior to Arrest):    Full       Admission Status:  I believe this patient meets INPATIENT status due to the need for care which can only be reasonably provided in an hospital setting including emergency surgical repair of hernia, antibiotics, fluids, supportive care.  As such, I feel patient’s risk for adverse outcomes and need for care warrant INPATIENT evaluation and predict the patient’s care encounter to likely last beyond 2 midnights.      Electronically signed by MILKA Diaz, 06/13/20, 10:45 PM.

## 2020-06-15 ENCOUNTER — APPOINTMENT (OUTPATIENT)
Dept: CT IMAGING | Facility: HOSPITAL | Age: 70
End: 2020-06-15

## 2020-06-15 ENCOUNTER — APPOINTMENT (OUTPATIENT)
Dept: GENERAL RADIOLOGY | Facility: HOSPITAL | Age: 70
End: 2020-06-15

## 2020-06-15 LAB
ALBUMIN SERPL-MCNC: 3.1 G/DL (ref 3.5–5.2)
ALBUMIN/GLOB SERPL: 1.3 G/DL
ALP SERPL-CCNC: 32 U/L (ref 39–117)
ALT SERPL W P-5'-P-CCNC: 10 U/L (ref 1–33)
ANION GAP SERPL CALCULATED.3IONS-SCNC: 8 MMOL/L (ref 5–15)
AST SERPL-CCNC: 26 U/L (ref 1–32)
BACTERIA UR QL AUTO: ABNORMAL /HPF
BASOPHILS # BLD MANUAL: 0 10*3/MM3 (ref 0–0.2)
BASOPHILS NFR BLD AUTO: 0 % (ref 0–1.5)
BILIRUB SERPL-MCNC: 0.3 MG/DL (ref 0.2–1.2)
BILIRUB UR QL STRIP: NEGATIVE
BUN BLD-MCNC: 17 MG/DL (ref 8–23)
BUN/CREAT SERPL: 25.4 (ref 7–25)
CALCIUM SPEC-SCNC: 7.8 MG/DL (ref 8.6–10.5)
CHLORIDE SERPL-SCNC: 101 MMOL/L (ref 98–107)
CLARITY UR: ABNORMAL
CO2 SERPL-SCNC: 27 MMOL/L (ref 22–29)
COLOR UR: YELLOW
CREAT BLD-MCNC: 0.67 MG/DL (ref 0.57–1)
D-LACTATE SERPL-SCNC: 1.1 MMOL/L (ref 0.5–2)
DEPRECATED RDW RBC AUTO: 44.5 FL (ref 37–54)
EOSINOPHIL # BLD MANUAL: 0 10*3/MM3 (ref 0–0.4)
EOSINOPHIL NFR BLD MANUAL: 0 % (ref 0.3–6.2)
ERYTHROCYTE [DISTWIDTH] IN BLOOD BY AUTOMATED COUNT: 13.1 % (ref 12.3–15.4)
GFR SERPL CREATININE-BSD FRML MDRD: 87 ML/MIN/1.73
GLOBULIN UR ELPH-MCNC: 2.3 GM/DL
GLUCOSE BLD-MCNC: 120 MG/DL (ref 65–99)
GLUCOSE UR STRIP-MCNC: NEGATIVE MG/DL
HCT VFR BLD AUTO: 24.4 % (ref 34–46.6)
HGB BLD-MCNC: 7.7 G/DL (ref 12–15.9)
HGB UR QL STRIP.AUTO: ABNORMAL
HYALINE CASTS UR QL AUTO: ABNORMAL /LPF
KETONES UR QL STRIP: ABNORMAL
LEUKOCYTE ESTERASE UR QL STRIP.AUTO: ABNORMAL
LYMPHOCYTES # BLD MANUAL: 0.5 10*3/MM3 (ref 0.7–3.1)
LYMPHOCYTES NFR BLD MANUAL: 3 % (ref 5–12)
LYMPHOCYTES NFR BLD MANUAL: 8 % (ref 19.6–45.3)
MCH RBC QN AUTO: 29.7 PG (ref 26.6–33)
MCHC RBC AUTO-ENTMCNC: 31.6 G/DL (ref 31.5–35.7)
MCV RBC AUTO: 94.2 FL (ref 79–97)
MONOCYTES # BLD AUTO: 0.19 10*3/MM3 (ref 0.1–0.9)
NEUTROPHILS # BLD AUTO: 5.54 10*3/MM3 (ref 1.7–7)
NEUTROPHILS NFR BLD MANUAL: 65 % (ref 42.7–76)
NEUTS BAND NFR BLD MANUAL: 24 % (ref 0–5)
NITRITE UR QL STRIP: NEGATIVE
PH UR STRIP.AUTO: 5.5 [PH] (ref 5–8)
PLAT MORPH BLD: NORMAL
PLATELET # BLD AUTO: 99 10*3/MM3 (ref 140–450)
PMV BLD AUTO: 12.7 FL (ref 6–12)
POTASSIUM BLD-SCNC: 4 MMOL/L (ref 3.5–5.2)
PROCALCITONIN SERPL-MCNC: 3.67 NG/ML (ref 0.1–0.25)
PROT SERPL-MCNC: 5.4 G/DL (ref 6–8.5)
PROT UR QL STRIP: ABNORMAL
RBC # BLD AUTO: 2.59 10*6/MM3 (ref 3.77–5.28)
RBC # UR: ABNORMAL /HPF
RBC MORPH BLD: NORMAL
REF LAB TEST METHOD: ABNORMAL
SODIUM BLD-SCNC: 136 MMOL/L (ref 136–145)
SP GR UR STRIP: 1.02 (ref 1–1.03)
SQUAMOUS #/AREA URNS HPF: ABNORMAL /HPF
UROBILINOGEN UR QL STRIP: ABNORMAL
WBC MORPH BLD: NORMAL
WBC NRBC COR # BLD: 6.22 10*3/MM3 (ref 3.4–10.8)
WBC UR QL AUTO: ABNORMAL /HPF
YEAST URNS QL MICRO: ABNORMAL /HPF

## 2020-06-15 PROCEDURE — 81001 URINALYSIS AUTO W/SCOPE: CPT | Performed by: SURGERY

## 2020-06-15 PROCEDURE — 99024 POSTOP FOLLOW-UP VISIT: CPT | Performed by: SURGERY

## 2020-06-15 PROCEDURE — 94799 UNLISTED PULMONARY SVC/PX: CPT

## 2020-06-15 PROCEDURE — 87070 CULTURE OTHR SPECIMN AEROBIC: CPT | Performed by: SURGERY

## 2020-06-15 PROCEDURE — P9016 RBC LEUKOCYTES REDUCED: HCPCS

## 2020-06-15 PROCEDURE — 87086 URINE CULTURE/COLONY COUNT: CPT | Performed by: INTERNAL MEDICINE

## 2020-06-15 PROCEDURE — 36430 TRANSFUSION BLD/BLD COMPNT: CPT

## 2020-06-15 PROCEDURE — 71045 X-RAY EXAM CHEST 1 VIEW: CPT

## 2020-06-15 PROCEDURE — 85007 BL SMEAR W/DIFF WBC COUNT: CPT | Performed by: SURGERY

## 2020-06-15 PROCEDURE — 74177 CT ABD & PELVIS W/CONTRAST: CPT

## 2020-06-15 PROCEDURE — 83605 ASSAY OF LACTIC ACID: CPT | Performed by: SURGERY

## 2020-06-15 PROCEDURE — 86900 BLOOD TYPING SEROLOGIC ABO: CPT

## 2020-06-15 PROCEDURE — 25010000002 ENOXAPARIN PER 10 MG: Performed by: SURGERY

## 2020-06-15 PROCEDURE — 25010000002 PIPERACILLIN SOD-TAZOBACTAM PER 1 G: Performed by: SURGERY

## 2020-06-15 PROCEDURE — 71260 CT THORAX DX C+: CPT

## 2020-06-15 PROCEDURE — 84145 PROCALCITONIN (PCT): CPT | Performed by: SURGERY

## 2020-06-15 PROCEDURE — 85025 COMPLETE CBC W/AUTO DIFF WBC: CPT | Performed by: SURGERY

## 2020-06-15 PROCEDURE — 25010000002 IOPAMIDOL 61 % SOLUTION: Performed by: INTERNAL MEDICINE

## 2020-06-15 PROCEDURE — 99233 SBSQ HOSP IP/OBS HIGH 50: CPT | Performed by: INTERNAL MEDICINE

## 2020-06-15 PROCEDURE — 87040 BLOOD CULTURE FOR BACTERIA: CPT | Performed by: INTERNAL MEDICINE

## 2020-06-15 PROCEDURE — 0 DIATRIZOATE MEGLUMINE & SODIUM PER 1 ML

## 2020-06-15 PROCEDURE — 80053 COMPREHEN METABOLIC PANEL: CPT | Performed by: SURGERY

## 2020-06-15 PROCEDURE — 87205 SMEAR GRAM STAIN: CPT | Performed by: SURGERY

## 2020-06-15 RX ORDER — SODIUM CHLORIDE 450 MG/100ML
75 INJECTION, SOLUTION INTRAVENOUS CONTINUOUS
Status: DISCONTINUED | OUTPATIENT
Start: 2020-06-15 | End: 2020-06-18

## 2020-06-15 RX ADMIN — ENOXAPARIN SODIUM 40 MG: 40 INJECTION SUBCUTANEOUS at 05:42

## 2020-06-15 RX ADMIN — ALBUTEROL SULFATE 2.5 MG: 2.5 SOLUTION RESPIRATORY (INHALATION) at 07:02

## 2020-06-15 RX ADMIN — DIATRIZOATE MEGLUMINE AND DIATRIZOATE SODIUM 15 ML: 660; 100 LIQUID ORAL; RECTAL at 14:19

## 2020-06-15 RX ADMIN — SODIUM CHLORIDE, PRESERVATIVE FREE 10 ML: 5 INJECTION INTRAVENOUS at 20:35

## 2020-06-15 RX ADMIN — TAZOBACTAM SODIUM AND PIPERACILLIN SODIUM 3.38 G: 375; 3 INJECTION, SOLUTION INTRAVENOUS at 14:20

## 2020-06-15 RX ADMIN — IOPAMIDOL 90 ML: 612 INJECTION, SOLUTION INTRAVENOUS at 18:00

## 2020-06-15 RX ADMIN — SODIUM CHLORIDE, POTASSIUM CHLORIDE, SODIUM LACTATE AND CALCIUM CHLORIDE 150 ML/HR: 600; 310; 30; 20 INJECTION, SOLUTION INTRAVENOUS at 05:42

## 2020-06-15 RX ADMIN — TAZOBACTAM SODIUM AND PIPERACILLIN SODIUM 3.38 G: 375; 3 INJECTION, SOLUTION INTRAVENOUS at 08:50

## 2020-06-15 RX ADMIN — SODIUM CHLORIDE 75 ML/HR: 4.5 INJECTION, SOLUTION INTRAVENOUS at 14:21

## 2020-06-15 RX ADMIN — SODIUM CHLORIDE, POTASSIUM CHLORIDE, SODIUM LACTATE AND CALCIUM CHLORIDE 150 ML/HR: 600; 310; 30; 20 INJECTION, SOLUTION INTRAVENOUS at 00:01

## 2020-06-15 RX ADMIN — ALBUTEROL SULFATE 2.5 MG: 2.5 SOLUTION RESPIRATORY (INHALATION) at 12:12

## 2020-06-15 RX ADMIN — ALBUTEROL SULFATE 2.5 MG: 2.5 SOLUTION RESPIRATORY (INHALATION) at 20:58

## 2020-06-15 NOTE — PLAN OF CARE
Patient overall doing well on shift. Wanted to shower this morning, dressing changed after shower. Ambulated 4 times on shift around the unit. Patient requested to not have liquids because she could not tolerate them, diet advanced to full liquid. 2 units PRBC's ordered, CT chest, CT A/P, Cxr ordered as well. ID consulted, no plans to change abx treatment at this time, awaiting cultures. Patient tolerated 1/2 contrast well then begin to complain of bloating but attempted to complete contrast dose. Contrast completed at 1500, called CT several times for exam, transport took patient off the floor around 1605 for exam. Patient back on floor and 1st unit of blood started. BP 90's/50's prior to infusion, afebrile. . No complaints during start of infusion, remained afebrile. Infusion rate increased to 150/hr, patient is tolerating well at this time. She is tired and appears to feel slightly worse than earlier on shift. Educated patient regarding blood transfusion, she is resting well in the bed at this time.Patient had adequate UOP, very concentrated and dark appearance. No BM, No flatus as of yet. Patient respiratory sputum sample sent to lab this evening, thick green/gray sputum noted several times today. Patient is not able to splint her abdomen well while coughing which is impairing her sputum production, gave her abd pillow and further education, still hesitant to use

## 2020-06-15 NOTE — PROGRESS NOTES
"Cc: POD#2 open SB resection/LIHR w mesh \"I'm cold\"    Timmyselma    A female friend and her nurse are in the room.  The patient's main complaint is that she is very cold especially both her feet.  She denies any edema.  I was called earlier because she wanted to advance her diet but now after drinking some contrast she feels very full and bloated.  No bowel movement or flatus since surgery.  Denies any nausea or vomiting.  Has a thick productive cough on occasion but having trouble getting a sputum sample because it is too thick and is sticking to the sides of the specimen collector.  She says she is voiding okay.  Denies chills or fever.  Temperature 99.0 pulse 96 respiration 16 blood pressure 120/61 she does not appear toxic.  Lungs clear.  Heart tachycardic.  Abdomen moderately distended, nontender, bowel sounds present but occasional higher pitch rumbling sounds.  Wound dressing removed and is clean dry and intact.  Left inguinal cellulitis resolved.  No abdominal wall or flank ecchymosis.  Lower extremities are warm and she complains that my hands are cold when I touch her feet.  No peripheral edema.  Lower extremities equal size.  Procalcitonin elevated 3.67 lactate normal 1.1 CMP normal except for glucose of 120 calcium of 7.8 total protein 5.4 albumin 3.10 alkaline phosphatase 32 white blood count is 6.2 with 65 segs 8 lymphs 3 monocytes 24 bands H&H 7.7 and 24.4 Plt 99K  prealbumin 10    Impression: Postoperative day #2 open reduction of strangulated inguinal incarcerated left direct hernia with associated SBO, small bowel resection and mesh repair complicated by intraoperative bleeding.       -Concern for infection:   Low grade temp, elevated prcalcitonin and persistent left shift with 24 bands on Zosyn.  Clinically non-toxic.  Hx lupus, long-standing smoker, poor pulm toilet, productive cough - high risk pulm complics.  POD#2 resection necrotic bowel w previous cellulitis left groin.  R/o left retroperitoneal " hematoma/infected hematoma s/p intraop femoral vein injury.    -Symptomatic anemia, no evidence of active bleeding. IV iron given yesterday for low level.  On lovenox     -Significant protein malnutrition, as expected with SBO at time of OR, no return GI fxn yet    -ATN improved    -Thrombocytopenia stable on lovenox    -Femoral vein injury at OR.  No LLE edema/paresthesias, etc.    Recommendations: Check urinalysis, sputum, CT scan of chest abdomen pelvis, consult infectious disease, cont Zosyn.  Transfuse 2 units PRBC's.  Diet advanced earlier to full liquids w Protein shakes (stage 2 bariatric).  See orders.

## 2020-06-15 NOTE — CONSULTS
INFECTIOUS DISEASE CONSULT/INITIAL HOSPITAL VISIT    Ro Walker  1950  [id number]    Date of consult: 6/15/2020    Admit date: 6/13/2020    Requesting Provider: Andra Raines MD  Evaluating physician: Jose De Jesus Quinteros MD  Reason for Consultation: necrotic bowel, left inguinal cellulitis, infected hematoma  Chief Complaint: above      Subjective   History of present illness:  Patient is a 70 y.o.  Yr old female with SLE (on Plaquenil), carcinoma in situe cervix, smoker, with increased left groin lump since 6/10/20 which became increasingly tender.  Initially seen at [facility] ED where CT revealed incarderated small bowel and left inguinal hernia.  Transferred to [facility] and went to surgery by Dr. Huy Lara on 6/14/20 where he found necrotic bowel, and probable infected hematoma.  The patient was placed on zosyn.  She was confused off and on post surgery.  Procalcitonin level 3.67.  No positive cultures.  Had persistent bandemia and fever postop.  I was consulted 6/15/20.    Past Medical History:   Diagnosis Date   • Cervical cancer (CMS/HCC)    • Lupus (CMS/HCC)    • Polyp of vocal cord        Past Surgical History:   Procedure Laterality Date   • CATARACT EXTRACTION  2008   • CERVICAL CONE BIOPSY     • CERVICAL CONE BIOPSY     • COLON RESECTION SMALL BOWEL N/A 6/13/2020    Procedure: COLON RESECTION SMALL BOWEL;  Surgeon: Huy Lara MD;  Location:  [facility];  Service: General;  Laterality: N/A;   • INGUINAL HERNIA REPAIR Left 6/13/2020    Procedure: INGUINAL HERNIA REPAIR LEFT;  Surgeon: Huy Lara MD;  Location:  [facility];  Service: General;  Laterality: Left;   • LAPAROSCOPIC ASSISTED VAGINAL HYSTERECTOMY N/A 2/26/2018    Procedure: LAPAROSCOPIC ASSISTED VAGINAL HYSTERECTOMY, BILATERAL SALPINGO OOPHORECTOMY;  Surgeon: María Dietrich MD;  Location:  [facility];  Service:    • RETINAL DETACHMENT REPAIR  2006   • THROAT SURGERY  2017   • TUBAL ABDOMINAL LIGATION         Pediatric History    Patient Guardian Status   • Not on file     Other Topics Concern   • Not on file   Social History Narrative   • Not on file   Smoker x 55 y, 0.5-1 ppd.  No drug or etoh.    family history includes Cancer in her brother and brother; Stomach cancer in her sister.    No Known Allergies      There is no immunization history on file for this patient.    Medication:    Current Facility-Administered Medications:   •  acetaminophen (TYLENOL) tablet 650 mg, 650 mg, Oral, Q4H PRN **OR** acetaminophen (TYLENOL) 160 MG/5ML solution 650 mg, 650 mg, Oral, Q4H PRN **OR** acetaminophen (TYLENOL) suppository 650 mg, 650 mg, Rectal, Q4H PRN, Huy Lara MD  •  albuterol (PROVENTIL) nebulizer solution 0.083% 2.5 mg/3mL, 2.5 mg, Nebulization, Q6H While Awake - RT, Huy Lara MD, 2.5 mg at 06/15/20 1212  •  diphenhydrAMINE (BENADRYL) injection 25 mg, 25 mg, Intravenous, Q4H PRN, Huy Lara MD  •  [START ON 2020] enoxaparin (LOVENOX) syringe 30 mg, 30 mg, Subcutaneous, Q24H, Ellen Mendez MD  •  HYDROmorphone (DILAUDID) injection 0.25 mg, 0.25 mg, Intravenous, Once, Huy Lara MD  •  HYDROmorphone (DILAUDID) injection 1 mg, 1 mg, Intravenous, Q2H PRN **AND** naloxone (NARCAN) injection 0.1 mg, 0.1 mg, Intravenous, Q5 Min PRN, Huy Lara MD  •  LORazepam (ATIVAN) injection 0.5 mg, 0.5 mg, Intravenous, Q12H PRN, Huy Lara MD  •  Morphine sulfate (PF) injection 4 mg, 4 mg, Intravenous, Q2H PRN, 4 mg at 20 **AND** naloxone (NARCAN) injection 0.4 mg, 0.4 mg, Intravenous, Q5 Min PRN, Huy Lara MD  •  [] HYDROmorphone (DILAUDID) injection 0.25 mg, 0.25 mg, Intravenous, Q2H PRN **AND** naloxone (NARCAN) injection 0.4 mg, 0.4 mg, Intravenous, Q5 Min PRN, Huy Lara MD  •  ondansetron (ZOFRAN) injection 4 mg, 4 mg, Intravenous, Q6H PRN **FOLLOWED BY** [START ON 2020] ondansetron (ZOFRAN) tablet 4 mg, 4 mg, Oral, Q6H PRN, Mraco  Huy Collado MD  •  pantoprazole (PROTONIX) injection 40 mg, 40 mg, Intravenous, Q AM, Huy Lara MD, 40 mg at 06/14/20 0626  •  Pharmacy Consult, , Does not apply, Continuous PRN, Huy Lara MD  •  phenol (CHLORASEPTIC) 1.4 % liquid 2 spray, 2 spray, Mouth/Throat, Q2H PRN, Huy Lara MD  •  piperacillin-tazobactam (ZOSYN) 3.375 g in iso-osmotic dextrose 50 ml (premix), 3.375 g, Intravenous, Q8H, Huy Lara MD, 3.375 g at 06/15/20 1420  •  promethazine (PHENERGAN) injection 12.5 mg, 12.5 mg, Intravenous, Q6H PRN **OR** promethazine (PHENERGAN) injection 12.5 mg, 12.5 mg, Intramuscular, Q6H PRN, Huy Lara MD  •  simethicone (MYLICON) chewable tablet 80 mg, 80 mg, Oral, 4x Daily PRN, Huy Lara MD  •  sodium chloride 0.45 % infusion, 75 mL/hr, Intravenous, Continuous, Huy Lara MD, Last Rate: 75 mL/hr at 06/15/20 1421, 75 mL/hr at 06/15/20 1421  •  sodium chloride 0.9 % flush 10 mL, 10 mL, Intravenous, Q12H, Huy Lara MD, 10 mL at 06/14/20 2032  •  sodium chloride 0.9 % flush 10 mL, 10 mL, Intravenous, PRN, Huy Lara MD    Please refer to the medical record for a full medication list    Review of Systems:    Constitutional-- No Fever, chills or sweats.  Appetite good, and no malaise. No fatigue.  HEENT-- No new vision, hearing or throat complaints.  No epistaxis or oral sores.  Denies odynophagia or dysphagia.  No odynophagia or dysphagia. No headache, photophobia or neck stiffness.  CV-- No chest pain, palpitation or syncope  Resp-- No SOB/cough/Hemoptysis  GI- No nausea, vomiting, or diarrhea.  No hematochezia, melena, or hematemesis. Denies jaundice or chronic liver disease.  -- No dysuria, hematuria, or flank pain.  Denies hesitancy, urgency or flank pain.  Lymph- no swollen lymph nodes in neck/axilla or groin.   Heme- No active bruising or bleeding; no Hx of DVT or PE.  MS-- no swelling or pain in the bones or joints  "of arms/legs.  No new back pain.  Neuro-- No acute focal weakness or numbness in the arms or legs.  No seizures.  Skin--No rashes or lesions    Physical Exam:   Vital Signs   Temp:  [98.7 °F (37.1 °C)-99.8 °F (37.7 °C)] 99 °F (37.2 °C)  Heart Rate:  [] 96  Resp:  [16-18] 16  BP: (120-140)/(52-61) 120/61    Temp  Min: 98.7 °F (37.1 °C)  Max: 99.8 °F (37.7 °C)  BP  Min: 120/61  Max: 140/52  Pulse  Min: 82  Max: 101  Resp  Min: 16  Max: 18  SpO2  Min: 92 %  Max: 99 %    Blood pressure 120/61, pulse 96, temperature 99 °F (37.2 °C), temperature source Oral, resp. rate 16, height 154.9 cm (61\"), weight 48.5 kg (107 lb), SpO2 96 %.  GENERAL: Awake and alert, in moderate distress. Appears older than stated age.  HEENT:  Normocephalic, atraumatic.  Oropharynx without thrush. Dentition in good repair. No cervical adenopathy. No neck masses.  Ears externally normal, Nose externally normal.  EYES: PERRL. No conjunctival injection. No icterus. EOM full.  LYMPHATICS: No lymphadenopathy of the neck or axillary or inguinal regions.   HEART: No murmur, gallop, or pericardial friction rub. Reg rate rhythm, No JVD at 45 degrees.  LUNGS: Clear to auscultation and percussion. No respiratory distress, no use of accessory muscles.  ABDOMEN: Soft, min tenderness left quadrant, nondistended. No appreciable HSM.  Bowel sounds normal.  GENITAL: No external lesions, breasts without masses, back straight, no CVAT, rectal external without lesions.   SKIN: Warm and dry without cutaneous eruptions.  No nodules.  Surg wounds ok.  Midline.  PSYCHIATRIC: Mental status lucid. No confusion.  EXT:  No cellulitic change.  NEURO: Oriented to name, CN 2 to 12 intact, DTR 1 + and symmetric, sensory intact to LT upper and lower extremitiy, motor 5/5 upper and lower extremity, cerebellar and gait not tested.      Results Review:   I reviewed the patient's new clinical results.  I reviewed the patient's new imaging results and agree with the " interpretation.  I reviewed the patient's other test results and agree with the interpretation    Results from last 7 days   Lab Units 06/15/20  0853 06/14/20  1249 06/14/20  0619   WBC 10*3/mm3 6.22 6.20 5.17   HEMOGLOBIN g/dL 7.7* 8.6* 8.4*   HEMATOCRIT % 24.4* 27.1* 26.6*   PLATELETS 10*3/mm3 99* 96* 88*     Results from last 7 days   Lab Units 06/15/20  0853   SODIUM mmol/L 136   POTASSIUM mmol/L 4.0   CHLORIDE mmol/L 101   CO2 mmol/L 27.0   BUN mg/dL 17   CREATININE mg/dL 0.67   GLUCOSE mg/dL 120*   CALCIUM mg/dL 7.8*     Results from last 7 days   Lab Units 06/15/20  0853   ALK PHOS U/L 32*   BILIRUBIN mg/dL 0.3   ALT (SGPT) U/L 10   AST (SGOT) U/L 26                 Results from last 7 days   Lab Units 06/15/20  0853   LACTATE mmol/L 1.1     Estimated Creatinine Clearance: 48.7 mL/min (by C-G formula based on SCr of 0.67 mg/dL).    Microbiology:  Microbiology Results Abnormal     Procedure Component Value - Date/Time    COVID PRE-OP / PRE-PROCEDURE SCREENING ORDER (NO ISOLATION) - Swab, Nasopharynx [[id number]] Collected:  06/13/20 2334    Lab Status:  Final result Specimen:  Swab from Nasopharynx Updated:  06/14/20 0045    Narrative:       The following orders were created for panel order COVID PRE-OP / PRE-PROCEDURE SCREENING ORDER (NO ISOLATION) - Swab, Nasopharynx.  Procedure                               Abnormality         Status                     ---------                               -----------         ------                     COVID-19,CEPHEID,[facility] IN-...[[id number]]  Normal              Final result                 Please view results for these tests on the individual orders.    COVID-19,CEPHEID,[facility] IN-HOUSE(OR EMERGENT/ADD-ON),NP SWAB IN TRANSPORT MEDIA 3-4 HR TAT - Swab, Nasopharynx [[id number]]  (Normal) Collected:  06/13/20 2334    Lab Status:  Final result Specimen:  Swab from Nasopharynx Updated:  06/14/20 0045     COVID19 Not Detected          Radiology:  Imaging Results (Last 72 Hours)      ** No results found for the last 72 hours. **          IMPRESSION:     1.  Incarcerated small bowel with necrosis, with increased risk for intraabdominal infection with strep, gnr, anaerobes.  2.  Repair with mesh, 6/14/20, of #1.  3.  Encephalopathy, toxic and metabolic related to above.  4.  Acute kidney injury on admit w/ Cr 1.3, resolved.  5.  Anemia, chronic disease, and acute post op.  6.  Thrombocytopenia, related to above.  7.  Hypocalcemia, 7.8.  8.  Left groin cellulitis on admit.  9.  Potential infected groin/RP hematoma.  10.  Acute hypoxic respiratory failure.    RECOMMENDATIONS:    1.  Diagnostically, follow exam, cbc, cmp, crp, blood cx x 2, urine eval and cx, procalcitonin level and radiographic studies.  Lactic acid.  2.  Therapeutically, continue zosyn awaiting further culture data.  Duration till 6/28/20.  May change to ertapenem to facilitate outpatient treatment.  3.  Wound care.  4.  O2 support as needed.    I discussed the patients findings and my recommendations with patient, family and nursing staff    Thank you for asking me to see Ro MIGDALIA Walker.  Our group would be pleased to follow this patient over the course of their hospitalization and assist with outpatient antimicrobial therapy, as indicated.  Further recommendations depend on the results of the cultures and clinical course.    Jose De Jesus Quinteros MD  6/15/2020

## 2020-06-15 NOTE — PROGRESS NOTES
"                  Clinical Nutrition     Reason for Visit:   Identified at risk by screening criteria, MST score 2+    Patient Name: Ro Walker  YOB: 1950  MRN: [id number]  Date of Encounter: 06/15/20 11:07  Admission date: 6/13/2020    Nutrition Assessment   Assessment     Admission diagnosis  Nausea/vomiting  Incarcerated hernia    Additional diagnosis/conditions/procedures this admission  (6/14) s/p colon resection, hernia repair  Small bowel obstruction  XI  Immunosuppressed    Additional PMH/procedures:  Lupus - chronic Plaquenil  Chronic tobacco use  Cervical cancer  Stomach cancer    Throat surgery (2017)  TAHSBO (2018)    Reported/Observed/Food/Nutrition Related History:      Patient reports she has had some bites of cream of wheat and yogurt. She really has not had a desire to eat much since Wednesday when she had a few crackers. She has a Premier Protein drink on her bedside tray. Encouraged her to utilize nutrition supplements as able. Patient reports her appetite was fair prior to surgery. States maybe a couple days before surgery she had noticed decreased intake but prior to that she was working in her garden daily and eating fairly well. Her UBW on her home scale is around 112 lbs \"give or take a few lbs either way.\" Denies noticeable weight loss prior to surgery.      Anthropometrics     Height: 154.9 cm (61\")  Last filed wt: Weight: 48.5 kg (107 lb) (06/13/20 2157)  Weight Method: Bed scale    BMI: BMI (Calculated): 20.2  Normal: 18.5-24.9kg/m2    Ideal Body Weight (IBW) (kg): 48.15  Admission wt: 107 lb  Method obtained: bed scale weight per charting 6/13    Weight Change   UBW: 112 lbs per pt report on home scale     Labs reviewed     Results from last 7 days   Lab Units 06/15/20  0853 06/14/20  0619 06/13/20  2304   GLUCOSE mg/dL 120* 111* 85   BUN mg/dL 17 33* 33*   CREATININE mg/dL 0.67 0.97 1.09*   SODIUM mmol/L 136 137 137   CHLORIDE mmol/L 101 99 93*   POTASSIUM mmol/L 4.0 " 4.2 4.2   ALT (SGPT) U/L 10 8 12     Results from last 7 days   Lab Units 06/15/20  0853 06/14/20  0619 06/13/20  2304   ALBUMIN g/dL 3.10* 3.50 4.20   PREALBUMIN mg/dL  --  10.0*  --            No results found for: HGBA1C      Medications reviewed   Pertinent: dilaudid, protonix, zosyn IV      Intake/Output 24 hrs (7:00AM - 6:59 AM)     Intake & Output (last day)       06/14 0701 - 06/15 0700 06/15 0701 - 06/16 0700    I.V. (mL/kg) 718 (14.8)     IV Piggyback      Total Intake(mL/kg) 718 (14.8)     Urine (mL/kg/hr) 500 (0.4)     Emesis/NG output      Total Output 500     Net +218           Urine Unmeasured Occurrence 6 x         Current Nutrition Prescription     PO: Diet Bariatric; Stage 2 Full Liquid Sugar Free (No Carbonation, No Straw)  Intake: 50% x 1 meal    Nutrition Diagnosis     6/15  Problem Inadequate oral intake   Etiology GI s/sxs   Signs/Symptoms Full liquid diet       Nutrition Intervention     1.  Follow treatment progress, Care plan reviewed  2.  Advise alternate selection, Interview for preferences   3. Encouraged oral intake  4. Supplements ordered- Premier Protein Daily    Goal:   General: Nutrition support treatment  PO: Tolerate PO, Increase intake      Monitoring/Evaluation:   Per protocol, PO intake, Supplement intake, Pertinent labs, Weight, GI status    Will Continue to follow per protocol    Ines Oglesby RDN, LD  Time Spent: 30 minutes

## 2020-06-15 NOTE — PLAN OF CARE
VSS; NSR on monitor; A&O, but is at times forgetful; tolerating liquids; UOP is adequate; c/o x1 of pain, prn given at ordered; ambulated x2 with assist of 1, adding distance. Patient instructed on use of IS and was given splint pillow; Cough is loose and productive, yellow to greenish tinge sputum.; Bed alarm is activated for pt safety, call light within her reach.; will continue to monitor. Resting without signs of distress at present

## 2020-06-15 NOTE — PROGRESS NOTES
[facility] Medicine Services  PROGRESS NOTE    Patient Name: Ro Walker  : 1950  MRN: [id number]    Date of Admission: 2020  Primary Care Physician: Tracy Schrader MD    Subjective   Subjective     CC:  abd pain     HPI:  Nursing notes reviewed.  No acute events overnight. Still no BM/flatus.  Confused intermittently per RN.     Review of Systems  Gen- No fevers, chills  CV- No chest pain, palpitations  Resp- No cough, dyspnea  GI- No N/V/D, + abd pain     Objective   Objective     Vital Signs:   Temp:  [98.6 °F (37 °C)-99.8 °F (37.7 °C)] 99 °F (37.2 °C)  Heart Rate:  [] 90  Resp:  [16-18] 17  BP: (120-140)/(52-79) 120/61        Physical Exam:  Constitutional: No acute distress, awake, alert  HENT: NCAT, mucous membranes moist  Respiratory: Clear to auscultation bilaterally, respiratory effort normal   Cardiovascular: RRR, no murmurs, rubs, or gallops, palpable pedal pulses bilaterally  Gastrointestinal: hypoactive bowel sounds, soft, diffuse tenderness, incision site dressing c/d/i  Musculoskeletal: No bilateral ankle edema  Psychiatric: Appropriate affect, cooperative  Neurologic: Oriented x 3, strength symmetric in all extremities, Cranial Nerves grossly intact to confrontation, speech clear  Skin: No rashes    Results Reviewed:  Results from last 7 days   Lab Units 06/15/20  0853 20  1249 20  0620  2215   WBC 10*3/mm3 6.22 6.20 5.17 9.57   HEMOGLOBIN g/dL 7.7* 8.6* 8.4* 13.5   HEMATOCRIT % 24.4* 27.1* 26.6* 42.3   PLATELETS 10*3/mm3 99* 96* 88* 115*   INR   --   --   --  1.02   PROCALCITONIN ng/mL 3.67*  --   --   --      Results from last 7 days   Lab Units 06/15/20  0853 20  0619 20  2304   SODIUM mmol/L 136 137 137   POTASSIUM mmol/L 4.0 4.2 4.2   CHLORIDE mmol/L 101 99 93*   CO2 mmol/L 27.0 24.0 26.0   BUN mg/dL 17 33* 33*   CREATININE mg/dL 0.67 0.97 1.09*   GLUCOSE mg/dL 120* 111* 85   CALCIUM mg/dL 7.8* 7.5* 8.7   ALT  (SGPT) U/L 10 8 12   AST (SGOT) U/L 26 16 28     Estimated Creatinine Clearance: 40.1 mL/min (by C-G formula based on SCr of 0.97 mg/dL).    Microbiology Results Abnormal     Procedure Component Value - Date/Time    COVID PRE-OP / PRE-PROCEDURE SCREENING ORDER (NO ISOLATION) - Swab, Nasopharynx [[id number]] Collected:  20    Lab Status:  Final result Specimen:  Swab from Nasopharynx Updated:  20    Narrative:       The following orders were created for panel order COVID PRE-OP / PRE-PROCEDURE SCREENING ORDER (NO ISOLATION) - Swab, Nasopharynx.  Procedure                               Abnormality         Status                     ---------                               -----------         ------                     COVID-19,CEPHEID,[facility] IN-...[[id number]]  Normal              Final result                 Please view results for these tests on the individual orders.    COVID-19,CEPHEID,[facility] IN-HOUSE(OR EMERGENT/ADD-ON),NP SWAB IN TRANSPORT MEDIA 3-4 HR TAT - Swab, Nasopharynx [[id number]]  (Normal) Collected:  20    Lab Status:  Final result Specimen:  Swab from Nasopharynx Updated:  20     COVID19 Not Detected          Imaging Results (Last 24 Hours)     ** No results found for the last 24 hours. **               I have reviewed the medications:  Scheduled Meds:    albuterol 2.5 mg Nebulization Q6H While Awake - RT   enoxaparin 40 mg Subcutaneous Q24H   HYDROmorphone 0.25 mg Intravenous Once   pantoprazole 40 mg Intravenous Q AM   piperacillin-tazobactam 3.375 g Intravenous Q8H   sodium chloride 10 mL Intravenous Q12H     Continuous Infusions:    lactated ringers 150 mL/hr Last Rate: 150 mL/hr (06/15/20 0542)     PRN Meds:.•  acetaminophen **OR** acetaminophen **OR** acetaminophen  •  diphenhydrAMINE  •  HYDROmorphone **AND** naloxone  •  LORazepam  •  Morphine **AND** naloxone  •  [] HYDROmorphone **AND** naloxone  •  ondansetron **FOLLOWED BY** [START ON 2020]  ondansetron  •  phenol  •  promethazine **OR** promethazine  •  simethicone  •  sodium chloride    Assessment/Plan   Assessment & Plan     Active Hospital Problems    Diagnosis  POA   • **Incarcerated hernia [K46.0]  Unknown   • Lupus (CMS/HCC) [M32.9]  Unknown   • History of cervical cancer [Z85.41]  Not Applicable   • XI (acute kidney injury) (CMS/HCC) [N17.9]  Unknown   • Tobacco abuse [Z72.0]  Yes      Resolved Hospital Problems    Diagnosis Date Resolved POA   • History of stomach cancer [Z85.028] 06/14/2020 Unknown        Brief Hospital Course to date:  Ro Walker is a 70 y.o. female with past medical history of lupus on chronic Plaquenil chronic smoker, history of cervical cancer who presents to outside ER with complaints of nausea, vomiting, no bowel movement since 6/8, and left inguinal pain and swelling. Diagnosed with incarcerated inguinal hernia and taken to the OR 6/14.     This patient's problems and plans were partially entered by my partner and updated as appropriate by me 06/15/20.    Incarcerated inguinal hernia s/p OR 6/14   - OR with Dr. Lara 6/14  - PRN pain control; PPI   - Continue zosyn     SBO  - NG placed during surgery, now removed  - Continue fluids;  ambulate as able. Diet as per Dr. Lara    Acute blood loss anemia   Iron deficiency anemia  - Initial H/H 13.5; current 7.7 - likely hemo-concentrated on admission  - Venous oozing noted intra-op likely from medial femoral vein which was repaired   -  iron low; ferrlecit once per surgery recs     XI   - pre-renal; improved with fluids     Lupus with chronic immunosuppression on Plaquenil   - holding plaquenil; resume when appropriate and taking PO     History of stomach cancer  History of cervical cancer     Tobacco abuse     Elevated procalcitonin  --plan as per Dr. Lara  --no leukocytosis nor left shift, afebrile      DVT Prophylaxis:  lovenox    Disposition: I expect the patient to be discharged TBD -- pending improvement      CODE STATUS:   Code Status and Medical Interventions:   Ordered at: 06/13/20 2250     Code Status:    CPR     Medical Interventions (Level of Support Prior to Arrest):    Full         Electronically signed by Ellen Mendez MD, 06/15/20, 08:27.

## 2020-06-16 ENCOUNTER — APPOINTMENT (OUTPATIENT)
Dept: CARDIOLOGY | Facility: HOSPITAL | Age: 70
End: 2020-06-16

## 2020-06-16 LAB
ALBUMIN SERPL-MCNC: 3 G/DL (ref 3.5–5.2)
ALBUMIN/GLOB SERPL: 1.2 G/DL
ALP SERPL-CCNC: 38 U/L (ref 39–117)
ALT SERPL W P-5'-P-CCNC: 13 U/L (ref 1–33)
ANION GAP SERPL CALCULATED.3IONS-SCNC: 10 MMOL/L (ref 5–15)
AST SERPL-CCNC: 28 U/L (ref 1–32)
BACTERIA SPEC AEROBE CULT: NO GROWTH
BASOPHILS # BLD AUTO: 0.01 10*3/MM3 (ref 0–0.2)
BASOPHILS NFR BLD AUTO: 0.2 % (ref 0–1.5)
BH BB BLOOD EXPIRATION DATE: NORMAL
BH BB BLOOD EXPIRATION DATE: NORMAL
BH BB BLOOD TYPE BARCODE: 5100
BH BB BLOOD TYPE BARCODE: 5100
BH BB DISPENSE STATUS: NORMAL
BH BB DISPENSE STATUS: NORMAL
BH BB PRODUCT CODE: NORMAL
BH BB PRODUCT CODE: NORMAL
BH BB UNIT NUMBER: NORMAL
BH BB UNIT NUMBER: NORMAL
BH CV ECHO MEAS - BSA(HAYCOCK): 1.4 M^2
BH CV ECHO MEAS - BSA: 1.4 M^2
BH CV ECHO MEAS - BZI_BMI: 20.2 KILOGRAMS/M^2
BH CV ECHO MEAS - BZI_METRIC_HEIGHT: 154.9 CM
BH CV ECHO MEAS - BZI_METRIC_WEIGHT: 48.5 KG
BH CV LOW VAS LEFT COMMON FEMORAL SPONT: 1
BH CV LOW VAS LEFT PROFUNDA FEMORAL SPONT: 1
BH CV LOW VAS LEFT SAPHENOFEMORAL JUNCTION SPONT: 1
BH CV LOWER VASCULAR LEFT COMMON FEMORAL AUGMENT: NORMAL
BH CV LOWER VASCULAR LEFT COMMON FEMORAL COMPRESS: NORMAL
BH CV LOWER VASCULAR LEFT COMMON FEMORAL PHASIC: NORMAL
BH CV LOWER VASCULAR LEFT COMMON FEMORAL SPONT: NORMAL
BH CV LOWER VASCULAR LEFT COMMON FEMORAL THROMBUS: NORMAL
BH CV LOWER VASCULAR LEFT DISTAL FEMORAL COMPRESS: NORMAL
BH CV LOWER VASCULAR LEFT GASTRONEMIUS COMPRESS: NORMAL
BH CV LOWER VASCULAR LEFT GREATER SAPH AK COMPRESS: NORMAL
BH CV LOWER VASCULAR LEFT GREATER SAPH BK COMPRESS: NORMAL
BH CV LOWER VASCULAR LEFT LESSER SAPH COMPRESS: NORMAL
BH CV LOWER VASCULAR LEFT MID FEMORAL AUGMENT: NORMAL
BH CV LOWER VASCULAR LEFT MID FEMORAL COMPRESS: NORMAL
BH CV LOWER VASCULAR LEFT MID FEMORAL PHASIC: NORMAL
BH CV LOWER VASCULAR LEFT MID FEMORAL SPONT: NORMAL
BH CV LOWER VASCULAR LEFT PERONEAL COMPRESS: NORMAL
BH CV LOWER VASCULAR LEFT POSTERIOR TIBIAL COMPRESS: NORMAL
BH CV LOWER VASCULAR LEFT PROFUNDA FEMORAL COMPRESS: NORMAL
BH CV LOWER VASCULAR LEFT PROFUNDA FEMORAL THROMBUS: NORMAL
BH CV LOWER VASCULAR LEFT PROXIMAL FEMORAL COMPRESS: NORMAL
BH CV LOWER VASCULAR LEFT SAPHENOFEMORAL JUNCTION AUGMENT: NORMAL
BH CV LOWER VASCULAR LEFT SAPHENOFEMORAL JUNCTION COMPRESS: NORMAL
BH CV LOWER VASCULAR LEFT SAPHENOFEMORAL JUNCTION PHASIC: NORMAL
BH CV LOWER VASCULAR LEFT SAPHENOFEMORAL JUNCTION SPONT: NORMAL
BH CV LOWER VASCULAR LEFT SAPHENOFEMORAL JUNCTION THROMBUS: NORMAL
BH CV LOWER VASCULAR RIGHT COMMON FEMORAL AUGMENT: NORMAL
BH CV LOWER VASCULAR RIGHT COMMON FEMORAL COMPRESS: NORMAL
BH CV LOWER VASCULAR RIGHT COMMON FEMORAL PHASIC: NORMAL
BH CV LOWER VASCULAR RIGHT COMMON FEMORAL SPONT: NORMAL
BH CV LOWER VASCULAR RIGHT DISTAL FEMORAL COMPRESS: NORMAL
BH CV LOWER VASCULAR RIGHT GASTRONEMIUS COMPRESS: NORMAL
BH CV LOWER VASCULAR RIGHT GREATER SAPH AK COMPRESS: NORMAL
BH CV LOWER VASCULAR RIGHT GREATER SAPH BK COMPRESS: NORMAL
BH CV LOWER VASCULAR RIGHT LESSER SAPH COMPRESS: NORMAL
BH CV LOWER VASCULAR RIGHT MID FEMORAL AUGMENT: NORMAL
BH CV LOWER VASCULAR RIGHT MID FEMORAL COMPETENT: NORMAL
BH CV LOWER VASCULAR RIGHT MID FEMORAL COMPRESS: NORMAL
BH CV LOWER VASCULAR RIGHT MID FEMORAL PHASIC: NORMAL
BH CV LOWER VASCULAR RIGHT MID FEMORAL SPONT: NORMAL
BH CV LOWER VASCULAR RIGHT PERONEAL COMPRESS: NORMAL
BH CV LOWER VASCULAR RIGHT POPLITEAL AUGMENT: NORMAL
BH CV LOWER VASCULAR RIGHT POPLITEAL COMPRESS: NORMAL
BH CV LOWER VASCULAR RIGHT POPLITEAL PHASIC: NORMAL
BH CV LOWER VASCULAR RIGHT POPLITEAL SPONT: NORMAL
BH CV LOWER VASCULAR RIGHT POSTERIOR TIBIAL COMPRESS: NORMAL
BH CV LOWER VASCULAR RIGHT PROFUNDA FEMORAL COMPRESS: NORMAL
BH CV LOWER VASCULAR RIGHT PROXIMAL FEMORAL COMPRESS: NORMAL
BH CV LOWER VASCULAR RIGHT SAPHENOFEMORAL JUNCTION AUGMENT: NORMAL
BH CV LOWER VASCULAR RIGHT SAPHENOFEMORAL JUNCTION COMPRESS: NORMAL
BH CV LOWER VASCULAR RIGHT SAPHENOFEMORAL JUNCTION PHASIC: NORMAL
BH CV LOWER VASCULAR RIGHT SAPHENOFEMORAL JUNCTION SPONT: NORMAL
BH CV LOWER VASCULAR RIGHT SOLEAL COMPRESS: NORMAL
BH CV POP FLUID COLLECT LEFT: 1
BH CV VAS POP FLUID COLLECTED: 1
BILIRUB SERPL-MCNC: 0.7 MG/DL (ref 0.2–1.2)
BUN BLD-MCNC: 15 MG/DL (ref 8–23)
BUN/CREAT SERPL: 21.7 (ref 7–25)
CALCIUM SPEC-SCNC: 7.9 MG/DL (ref 8.6–10.5)
CHLORIDE SERPL-SCNC: 99 MMOL/L (ref 98–107)
CO2 SERPL-SCNC: 26 MMOL/L (ref 22–29)
CREAT BLD-MCNC: 0.69 MG/DL (ref 0.57–1)
CROSSMATCH INTERPRETATION: NORMAL
CROSSMATCH INTERPRETATION: NORMAL
CYTO UR: NORMAL
D-LACTATE SERPL-SCNC: 1 MMOL/L (ref 0.5–2)
DEPRECATED RDW RBC AUTO: 45.7 FL (ref 37–54)
EOSINOPHIL # BLD AUTO: 0 10*3/MM3 (ref 0–0.4)
EOSINOPHIL NFR BLD AUTO: 0 % (ref 0.3–6.2)
ERYTHROCYTE [DISTWIDTH] IN BLOOD BY AUTOMATED COUNT: 13.3 % (ref 12.3–15.4)
GFR SERPL CREATININE-BSD FRML MDRD: 84 ML/MIN/1.73
GLOBULIN UR ELPH-MCNC: 2.6 GM/DL
GLUCOSE BLD-MCNC: 89 MG/DL (ref 65–99)
HCT VFR BLD AUTO: 27.6 % (ref 34–46.6)
HCT VFR BLD AUTO: 30.3 % (ref 34–46.6)
HGB BLD-MCNC: 9.1 G/DL (ref 12–15.9)
HGB BLD-MCNC: 9.7 G/DL (ref 12–15.9)
IMM GRANULOCYTES # BLD AUTO: 0.04 10*3/MM3 (ref 0–0.05)
IMM GRANULOCYTES NFR BLD AUTO: 0.8 % (ref 0–0.5)
LAB AP CASE REPORT: NORMAL
LAB AP CLINICAL INFORMATION: NORMAL
LYMPHOCYTES # BLD AUTO: 0.9 10*3/MM3 (ref 0.7–3.1)
LYMPHOCYTES NFR BLD AUTO: 18.8 % (ref 19.6–45.3)
MCH RBC QN AUTO: 29.8 PG (ref 26.6–33)
MCHC RBC AUTO-ENTMCNC: 32 G/DL (ref 31.5–35.7)
MCV RBC AUTO: 93.2 FL (ref 79–97)
MONOCYTES # BLD AUTO: 0.39 10*3/MM3 (ref 0.1–0.9)
MONOCYTES NFR BLD AUTO: 8.1 % (ref 5–12)
NEUTROPHILS # BLD AUTO: 3.45 10*3/MM3 (ref 1.7–7)
NEUTROPHILS NFR BLD AUTO: 72.1 % (ref 42.7–76)
NRBC BLD AUTO-RTO: 0 /100 WBC (ref 0–0.2)
PATH REPORT.FINAL DX SPEC: NORMAL
PATH REPORT.GROSS SPEC: NORMAL
PLAT MORPH BLD: NORMAL
PLATELET # BLD AUTO: 94 10*3/MM3 (ref 140–450)
PMV BLD AUTO: 12.9 FL (ref 6–12)
POTASSIUM BLD-SCNC: 3.8 MMOL/L (ref 3.5–5.2)
PROCALCITONIN SERPL-MCNC: 2.06 NG/ML (ref 0.1–0.25)
PROT SERPL-MCNC: 5.6 G/DL (ref 6–8.5)
RBC # BLD AUTO: 3.25 10*6/MM3 (ref 3.77–5.28)
RBC MORPH BLD: NORMAL
SODIUM BLD-SCNC: 135 MMOL/L (ref 136–145)
TRIGL SERPL-MCNC: 96 MG/DL (ref 0–150)
UNIT  ABO: NORMAL
UNIT  ABO: NORMAL
UNIT  RH: NORMAL
UNIT  RH: NORMAL
WBC MORPH BLD: NORMAL
WBC NRBC COR # BLD: 4.79 10*3/MM3 (ref 3.4–10.8)

## 2020-06-16 PROCEDURE — 84478 ASSAY OF TRIGLYCERIDES: CPT

## 2020-06-16 PROCEDURE — 93970 EXTREMITY STUDY: CPT

## 2020-06-16 PROCEDURE — 25010000002 ENOXAPARIN PER 10 MG: Performed by: SURGERY

## 2020-06-16 PROCEDURE — 84145 PROCALCITONIN (PCT): CPT | Performed by: INTERNAL MEDICINE

## 2020-06-16 PROCEDURE — 93970 EXTREMITY STUDY: CPT | Performed by: INTERNAL MEDICINE

## 2020-06-16 PROCEDURE — 85018 HEMOGLOBIN: CPT | Performed by: INTERNAL MEDICINE

## 2020-06-16 PROCEDURE — 25010000002 ENOXAPARIN PER 10 MG: Performed by: INTERNAL MEDICINE

## 2020-06-16 PROCEDURE — 25010000002 CALCIUM GLUCONATE PER 10 ML: Performed by: SURGERY

## 2020-06-16 PROCEDURE — 85007 BL SMEAR W/DIFF WBC COUNT: CPT | Performed by: SURGERY

## 2020-06-16 PROCEDURE — 99233 SBSQ HOSP IP/OBS HIGH 50: CPT | Performed by: INTERNAL MEDICINE

## 2020-06-16 PROCEDURE — 85014 HEMATOCRIT: CPT | Performed by: INTERNAL MEDICINE

## 2020-06-16 PROCEDURE — 99024 POSTOP FOLLOW-UP VISIT: CPT | Performed by: SURGERY

## 2020-06-16 PROCEDURE — 94799 UNLISTED PULMONARY SVC/PX: CPT

## 2020-06-16 PROCEDURE — 80053 COMPREHEN METABOLIC PANEL: CPT | Performed by: INTERNAL MEDICINE

## 2020-06-16 PROCEDURE — 25010000002 POTASSIUM CHLORIDE PER 2 MEQ OF POTASSIUM: Performed by: SURGERY

## 2020-06-16 PROCEDURE — 25010000002 MAGNESIUM SULFATE PER 500 MG OF MAGNESIUM: Performed by: SURGERY

## 2020-06-16 PROCEDURE — 85025 COMPLETE CBC W/AUTO DIFF WBC: CPT | Performed by: SURGERY

## 2020-06-16 PROCEDURE — 25010000002 PIPERACILLIN SOD-TAZOBACTAM PER 1 G: Performed by: SURGERY

## 2020-06-16 PROCEDURE — 83605 ASSAY OF LACTIC ACID: CPT | Performed by: INTERNAL MEDICINE

## 2020-06-16 RX ORDER — SODIUM CHLORIDE 9 MG/ML
250 INJECTION, SOLUTION INTRAVENOUS CONTINUOUS
Status: ACTIVE | OUTPATIENT
Start: 2020-06-16 | End: 2020-06-16

## 2020-06-16 RX ORDER — POTASSIUM CHLORIDE 7.45 MG/ML
10 INJECTION INTRAVENOUS
Status: DISCONTINUED | OUTPATIENT
Start: 2020-06-16 | End: 2020-06-19 | Stop reason: HOSPADM

## 2020-06-16 RX ORDER — MAGNESIUM SULFATE HEPTAHYDRATE 40 MG/ML
2 INJECTION, SOLUTION INTRAVENOUS AS NEEDED
Status: DISCONTINUED | OUTPATIENT
Start: 2020-06-16 | End: 2020-06-19 | Stop reason: HOSPADM

## 2020-06-16 RX ORDER — MAGNESIUM SULFATE HEPTAHYDRATE 40 MG/ML
4 INJECTION, SOLUTION INTRAVENOUS AS NEEDED
Status: DISCONTINUED | OUTPATIENT
Start: 2020-06-16 | End: 2020-06-19 | Stop reason: HOSPADM

## 2020-06-16 RX ADMIN — SODIUM CHLORIDE 250 ML/HR: 9 INJECTION, SOLUTION INTRAVENOUS at 09:38

## 2020-06-16 RX ADMIN — I.V. FAT EMULSION 250 ML: 20 EMULSION INTRAVENOUS at 17:49

## 2020-06-16 RX ADMIN — ALBUTEROL SULFATE 2.5 MG: 2.5 SOLUTION RESPIRATORY (INHALATION) at 15:48

## 2020-06-16 RX ADMIN — ENOXAPARIN SODIUM 50 MG: 60 INJECTION SUBCUTANEOUS at 13:28

## 2020-06-16 RX ADMIN — ENOXAPARIN SODIUM 30 MG: 30 INJECTION SUBCUTANEOUS at 05:43

## 2020-06-16 RX ADMIN — CALCIUM GLUCONATE: 94 INJECTION, SOLUTION INTRAVENOUS at 17:49

## 2020-06-16 RX ADMIN — PANTOPRAZOLE SODIUM 40 MG: 40 INJECTION, POWDER, FOR SOLUTION INTRAVENOUS at 05:43

## 2020-06-16 RX ADMIN — ALBUTEROL SULFATE 2.5 MG: 2.5 SOLUTION RESPIRATORY (INHALATION) at 07:17

## 2020-06-16 RX ADMIN — TAZOBACTAM SODIUM AND PIPERACILLIN SODIUM 3.38 G: 375; 3 INJECTION, SOLUTION INTRAVENOUS at 07:34

## 2020-06-16 RX ADMIN — TAZOBACTAM SODIUM AND PIPERACILLIN SODIUM 3.38 G: 375; 3 INJECTION, SOLUTION INTRAVENOUS at 00:02

## 2020-06-16 RX ADMIN — SODIUM CHLORIDE 75 ML/HR: 4.5 INJECTION, SOLUTION INTRAVENOUS at 02:09

## 2020-06-16 RX ADMIN — SODIUM CHLORIDE 75 ML/HR: 4.5 INJECTION, SOLUTION INTRAVENOUS at 15:47

## 2020-06-16 RX ADMIN — TAZOBACTAM SODIUM AND PIPERACILLIN SODIUM 3.38 G: 375; 3 INJECTION, SOLUTION INTRAVENOUS at 15:39

## 2020-06-16 NOTE — PLAN OF CARE
Problem: Patient Care Overview  Goal: Plan of Care Review  Outcome: Ongoing (interventions implemented as appropriate)  Flowsheets (Taken 6/16/2020 4778)  Progress: improving  Plan of Care Reviewed With: patient  Note:   VSS. Pt A&O but forgetful at times. Pt received 2 units PRBCs. No c/o pain this shift. Pt not passing gas/had BM yet. Pt on 2L NC. Afebrile overnight. NSR on the monitor. Bed alarm on for patient safety. Call light in reach. Will continue to monitor.

## 2020-06-16 NOTE — PROGRESS NOTES
[facility] Medicine Services  PROGRESS NOTE    Patient Name: Ro Walker  : 1950  MRN: [id number]    Date of Admission: 2020  Primary Care Physician: Tracy Schrader MD    Subjective   Subjective     CC:  abd pain     HPI:  Nursing notes reviewed.  Pt seen and examined. Pt got 2 units of PRBCs last evening due to hemoglobin of 7.7 and borderline hypotension.  Doing better this am.  Still no BM nor flatus. Tolerating clears okay. States that she wants to go home soon.    Review of Systems  Gen- No fevers, chills  CV- No chest pain, palpitations  Resp- No cough, dyspnea  GI- No N/V/D, + abd pain     Objective   Objective     Vital Signs:   Temp:  [98 °F (36.7 °C)-98.9 °F (37.2 °C)] 98 °F (36.7 °C)  Heart Rate:  [] 83  Resp:  [16-18] 18  BP: ()/(53-69) 143/61        Physical Exam:  Constitutional: No acute distress, awake, alert  HENT: NCAT, mucous membranes moist  Respiratory: Clear to auscultation bilaterally, respiratory effort normal   Cardiovascular: RRR, no murmurs, rubs, or gallops, palpable pedal pulses bilaterally  Gastrointestinal: hypoactive bowel sounds, soft, diffuse tenderness, incision site dressing c/d/i  Musculoskeletal: No bilateral ankle edema  Psychiatric: Appropriate affect, cooperative  Neurologic: Oriented x 3, strength symmetric in all extremities, Cranial Nerves grossly intact to confrontation, speech clear  Skin: No rashes    Results Reviewed:  Results from last 7 days   Lab Units 20  0630 20  0209 06/15/20  0853 20  1249  20  2215   WBC 10*3/mm3 4.79  --  6.22 6.20   < > 9.57   HEMOGLOBIN g/dL 9.7* 9.1* 7.7* 8.6*   < > 13.5   HEMATOCRIT % 30.3* 27.6* 24.4* 27.1*   < > 42.3   PLATELETS 10*3/mm3 94*  --  99* 96*   < > 115*   INR   --   --   --   --   --  1.02   PROCALCITONIN ng/mL 2.06*  --  3.67*  --   --   --     < > = values in this interval not displayed.     Results from last 7 days   Lab Units 20  0694  Discussion/Summary   Rogelio Rivera,   Your labs are enclosed.  Your cholesterol is a little high but you have no indication to be on cholesterol medication at this time.  The remainder of your labs are normal.  I recommend repeating the labs in 1 year.   Sincerely,   Dr. Beena Flaherty        Verified Results  COMP METABOLIC PANEL WITH CBCA, LIPID PANEL AND TSH (CMP,CBCA,LIPFA,TSH) [id number] 09:59AM BEENA FLAHERTY   [Dec 11, 2017 3:25PM BEENA FLAHERTY]  labs ok except for mildly elevated cholesterol. No indication to be on a statin at this time (10 year ANN risk 4.5%).     Test Name Result Flag Reference   WHITE BLOOD COUNT 10.0 K/mcL  4.2-11.0   RED CELL COUNT 4.86 mil/mcL  4.00-5.20   HEMOGLOBIN 13.9 g/dl  12.0-15.5   HEMATOCRIT 41.8 %  36.0-46.5   MEAN CORPUSCULAR VOLUME 86.0 fL  78.0-100.0   MEAN CORPUSCULAR HEMOGLOBIN 28.6 pg  26.0-34.0   MEAN CORPUSCULAR HGB CONC 33.3 g/dl  32.0-36.5   RDW-CV 12.3 %  11.0-15.0   PLATELET COUNT 346 K/mcL  140-450   GIOVANNI% 54 %     LYM% 36 %     MON% 7 %     EOS% 3 %     BASO% 0 %     GIOVANNI ABS 5.4 K/mcL  1.8-7.7   LYM ABS 3.5 K/mcL  1.0-4.0   MON ABS 0.7 K/mcL  0.3-0.9   EOS ABS 0.3 K/mcL  0.1-0.5   BASO ABS 0.0 K/mcL  0.0-0.3   SODIUM 141 mmol/L  135-145   POTASSIUM 4.4 mmol/L  3.4-5.1   CHLORIDE 103 mmol/L     CARBON DIOXIDE 28 mmol/L  21-32   ANION GAP 14 mmol/L  10-20   GLUCOSE 82 mg/dl  65-99   BUN 12 mg/dl  6-20   CREATININE 0.79 mg/dl  0.51-0.95   GFR EST.AFRICAN AMER >90     eGFR results = or >90 mL/min/1.73m2 = Normal kidney function.   GFR EST.NONAFRI AMER 80     eGFR 60 - 89 mL/min/1.73m2 = Mild decrease in kidney function.   BUN/CREATININE RATIO 15  7-25   BILIRUBIN TOTAL 1.0 mg/dl  0.2-1.0   GOT/AST 18 Units/L  <38   ALKALINE PHOSPHATASE 111 Units/L     ALBUMIN 4.5 g/dl  3.6-5.1   TOTAL PROTEIN 7.9 g/dl  6.4-8.2   GLOBULIN (CALCULATED) 3.4 g/dl  2.0-4.0   A/G RATIO 1.3  1.0-2.4   CALCIUM 10.1 mg/dl  8.4-10.2   GPT/ALT 21 Units/L  <79   FASTING STATUS 12  06/15/20  0853 06/14/20  0619   SODIUM mmol/L 135* 136 137   POTASSIUM mmol/L 3.8 4.0 4.2   CHLORIDE mmol/L 99 101 99   CO2 mmol/L 26.0 27.0 24.0   BUN mg/dL 15 17 33*   CREATININE mg/dL 0.69 0.67 0.97   GLUCOSE mg/dL 89 120* 111*   CALCIUM mg/dL 7.9* 7.8* 7.5*   ALT (SGPT) U/L 13 10 8   AST (SGOT) U/L 28 26 16     Estimated Creatinine Clearance: 48.7 mL/min (by C-G formula based on SCr of 0.69 mg/dL).    Microbiology Results Abnormal     Procedure Component Value - Date/Time    Respiratory Culture - Sputum, Cough [[id number]] Collected:  06/15/20 1837    Lab Status:  Preliminary result Specimen:  Sputum from Cough Updated:  06/15/20 2112     Gram Stain Many (4+) WBCs per low power field      Moderate (3+) Epithelial cells per low power field      No organisms seen    COVID PRE-OP / PRE-PROCEDURE SCREENING ORDER (NO ISOLATION) - Swab, Nasopharynx [[id number]] Collected:  06/13/20 2334    Lab Status:  Final result Specimen:  Swab from Nasopharynx Updated:  06/14/20 0045    Narrative:       The following orders were created for panel order COVID PRE-OP / PRE-PROCEDURE SCREENING ORDER (NO ISOLATION) - Swab, Nasopharynx.  Procedure                               Abnormality         Status                     ---------                               -----------         ------                     COVID-19,CEPHEID,ANIL IN-...[[id number]]  Normal              Final result                 Please view results for these tests on the individual orders.    COVID-19,CEPHEID,ANIL IN-HOUSE(OR EMERGENT/ADD-ON),NP SWAB IN TRANSPORT MEDIA 3-4 HR TAT - Swab, Nasopharynx [[id number]]  (Normal) Collected:  06/13/20 2334    Lab Status:  Final result Specimen:  Swab from Nasopharynx Updated:  06/14/20 0045     COVID19 Not Detected          Imaging Results (Last 24 Hours)     Procedure Component Value Units Date/Time    CT Chest With Contrast [[id number]] Collected:  06/16/20 0817     Updated:  06/16/20 0953    Narrative:       EXAMINATION: CT CHEST  hrs     CHOLESTEROL 255 mg/dl H <200   Desirable            <200  Borderline High      200 to 239  High                 >=240   HDL CHOLESTEROL 72 mg/dl  >49   Low            <40  Borderline Low 40 to 49  Near Optimal   50 to 59  Optimal        >=60   TRIGLYCERIDES 205 mg/dl H <150   Normal                   <150  Borderline High          150 to 199  High                     200 to 499  Very High                >=500   LDL CHOLESTEROL (CALCULATED) 142 mg/dl H <130   OPTIMAL               <100  NEAR OPTIMAL          100-129  BORDERLINE HIGH       130-159  HIGH                  160-189  VERY HIGH             >=190   NON-HDL CHOLESTEROL 183 mg/dl     Therapeutic Target:  CHD and risk equivalents <130  Multiple risk factors    <160  0 to 1 risk factors      <190   CHOLESTEROL/HDL RATIO 3.5  <4.5   TSH 3.783 mcUnits/mL  0.350-5.000   DIFF TYPE      AUTOMATED DIFFERENTIAL   FASTING STATUS 12 hrs          W CONTRAST-, CT ABDOMEN AND PELVIS W CONTRAST-      INDICATION: Postop bandemia, procalcitonin elevation, smoker, SLE;  K40.90-Unilateral inguinal hernia, without obstruction or gangrene, not  specified as recurrent.      TECHNIQUE: CT chest, abdomen and pelvis with intravenous contrast.     The radiation dose reduction device was turned on for each scan per the  ALARA (As Low as Reasonably Achievable) protocol.     COMPARISON: Chest x-ray dated 06/15/2020.     FINDINGS: CHEST: Thyroid is homogeneous in attenuation. No bulky  mediastinal adenopathy. Central airways are patent. Esophagus in normal  course and caliber. Patent nonaneurysmal thoracic aorta with patent  great vessel origins. Central pulmonary arteries are grossly patent.  Cardiac size borderline enlarged without pericardial effusion. Extended  lung windows demonstrate opacifications at the lung bases right greater  than left with air bronchograms present and trace volume right greater  than left bilateral pleural effusions. Considerations for atelectasis  versus airspace disease intermixed with healed granulomatous  involvement. Degenerative changes of the thoracic spine without  aggressive osseous lesion. No soft tissue body wall findings of concern.     ABDOMEN AND PELVIS: Liver demonstrates mild diffuse low-attenuation of  hepatic steatosis with several subcentimeter areas of likely cystic  lesions too small to characterize. Gallbladder is folded in  configuration without cholelithiasis clearly evident. No biliary  dilatation. Pancreas and spleen unremarkable. Adrenals without distinct  nodule. Kidneys without hydronephrosis or hydroureter. No bulky  retroperitoneal adenopathy. Atherosclerotic abdominal aorta measuring up  to 3.7 cm aneurysmal in the infrarenal portion. Portal veins and IVC  patent. GI tract evaluation demonstrates moderate distended gastric  lumen with air-fluid level. Dilated fluid-filled bowel in the left  midabdomen extending to  the lower midabdomen there is an apparent  transition point at the noted anastomosis. Limited evaluation for  extraluminal contact given obstructive bowel gas pattern limiting flow  of intraluminal contrast, however, there is postsurgical change of the  intraabdominal wall and extraluminal fluid in the left lower quadrant  anteriorly with mixed gas locules, however no definitive abscess is  clearly evident despite trace volume ascites. Pelvic viscera otherwise  unremarkable. Degenerative changes of the spine without aggressive  osseous lesion. Asymmetric edema within the left proximal thigh, may  represent postsurgical change, however, underlying deep vein thrombosis  could have a similar appearance.       Impression:       1. Dilated fluid-filled small bowel within the left midabdomen extending  to the lower midabdomen where there is anastomosis in the right lower  midabdomen concerning for transition point and obstructive or at least  partially obstructive gas pattern with postsurgical changes including  extraluminal gas and fluid which should be correlated with surgical  history to evaluate for appropriate time point, however no definitive  peripherally enhancing focal fluid collection to suggest abscess,  however, likely premature for development of definitive abscess given  postsurgical changes. Limited evaluation of intraluminal contrast extent  of involvement due to obstructive bowel gas pattern.  2. Opacifications of the lung bases right greater than left of moderate  to severe atelectatic changes of subsegmental involvement with trace  bilateral pleural effusions.  3. Asymmetric edema within the lower left abdomen and left thigh body  wall tissues may represent postsurgical change given left inguinal soft  tissue gas, however, underlying deep vein thrombosis could have a  similar appearance and may be further evaluated with duplex.     D:  06/16/2020  E:  06/16/2020       CT Abdomen Pelvis With Contrast  [[id number]] Collected:  06/16/20 0817     Updated:  06/16/20 0953    Narrative:       EXAMINATION: CT CHEST W CONTRAST-, CT ABDOMEN AND PELVIS W CONTRAST-      INDICATION: Postop bandemia, procalcitonin elevation, smoker, SLE;  K40.90-Unilateral inguinal hernia, without obstruction or gangrene, not  specified as recurrent.      TECHNIQUE: CT chest, abdomen and pelvis with intravenous contrast.     The radiation dose reduction device was turned on for each scan per the  ALARA (As Low as Reasonably Achievable) protocol.     COMPARISON: Chest x-ray dated 06/15/2020.     FINDINGS: CHEST: Thyroid is homogeneous in attenuation. No bulky  mediastinal adenopathy. Central airways are patent. Esophagus in normal  course and caliber. Patent nonaneurysmal thoracic aorta with patent  great vessel origins. Central pulmonary arteries are grossly patent.  Cardiac size borderline enlarged without pericardial effusion. Extended  lung windows demonstrate opacifications at the lung bases right greater  than left with air bronchograms present and trace volume right greater  than left bilateral pleural effusions. Considerations for atelectasis  versus airspace disease intermixed with healed granulomatous  involvement. Degenerative changes of the thoracic spine without  aggressive osseous lesion. No soft tissue body wall findings of concern.     ABDOMEN AND PELVIS: Liver demonstrates mild diffuse low-attenuation of  hepatic steatosis with several subcentimeter areas of likely cystic  lesions too small to characterize. Gallbladder is folded in  configuration without cholelithiasis clearly evident. No biliary  dilatation. Pancreas and spleen unremarkable. Adrenals without distinct  nodule. Kidneys without hydronephrosis or hydroureter. No bulky  retroperitoneal adenopathy. Atherosclerotic abdominal aorta measuring up  to 3.7 cm aneurysmal in the infrarenal portion. Portal veins and IVC  patent. GI tract evaluation demonstrates moderate  distended gastric  lumen with air-fluid level. Dilated fluid-filled bowel in the left  midabdomen extending to the lower midabdomen there is an apparent  transition point at the noted anastomosis. Limited evaluation for  extraluminal contact given obstructive bowel gas pattern limiting flow  of intraluminal contrast, however, there is postsurgical change of the  intraabdominal wall and extraluminal fluid in the left lower quadrant  anteriorly with mixed gas locules, however no definitive abscess is  clearly evident despite trace volume ascites. Pelvic viscera otherwise  unremarkable. Degenerative changes of the spine without aggressive  osseous lesion. Asymmetric edema within the left proximal thigh, may  represent postsurgical change, however, underlying deep vein thrombosis  could have a similar appearance.       Impression:       1. Dilated fluid-filled small bowel within the left midabdomen extending  to the lower midabdomen where there is anastomosis in the right lower  midabdomen concerning for transition point and obstructive or at least  partially obstructive gas pattern with postsurgical changes including  extraluminal gas and fluid which should be correlated with surgical  history to evaluate for appropriate time point, however no definitive  peripherally enhancing focal fluid collection to suggest abscess,  however, likely premature for development of definitive abscess given  postsurgical changes. Limited evaluation of intraluminal contrast extent  of involvement due to obstructive bowel gas pattern.  2. Opacifications of the lung bases right greater than left of moderate  to severe atelectatic changes of subsegmental involvement with trace  bilateral pleural effusions.  3. Asymmetric edema within the lower left abdomen and left thigh body  wall tissues may represent postsurgical change given left inguinal soft  tissue gas, however, underlying deep vein thrombosis could have a  similar appearance and may  be further evaluated with duplex.     D:  2020  E:  2020       XR Chest 1 View [[id number]] Collected:  06/15/20 1711     Updated:  20 0843    Narrative:       EXAMINATION: XR CHEST 1 VW-06/15/2020:     INDICATION: Sepsis; K40.90-Unilateral inguinal hernia, without  obstruction or gangrene, not specified as recurrent.     COMPARISON: 2018.     FINDINGS: The heart shadow is borderline enlarged. There is increased  diffuse pulmonary interstitial disease that may reflect interstitial  edema or changes of ARDS. There is very mild bibasilar discoid  atelectasis. No other focal lung disease is seen. Biapical pleural  thickening, right greater than left, is smooth-margined and stable.       Impression:       1.  Interval development of diffuse interstitial disease, resembling  early changes of interstitial edema, possibly ARDS. By history, chest CT  scan has been ordered for later today.  2.  Mild bibasilar discoid atelectasis.      D:  06/15/2020  E:  2020                     I have reviewed the medications:  Scheduled Meds:    albuterol 2.5 mg Nebulization Q6H While Awake - RT   enoxaparin 30 mg Subcutaneous Q24H   HYDROmorphone 0.25 mg Intravenous Once   pantoprazole 40 mg Intravenous Q AM   piperacillin-tazobactam 3.375 g Intravenous Q8H   sodium chloride 10 mL Intravenous Q12H     Continuous Infusions:    Pharmacy Consult     sodium chloride 75 mL/hr Last Rate: 75 mL/hr (20 0209)     PRN Meds:.•  acetaminophen **OR** acetaminophen **OR** acetaminophen  •  diphenhydrAMINE  •  HYDROmorphone **AND** naloxone  •  LORazepam  •  Morphine **AND** naloxone  •  [] HYDROmorphone **AND** naloxone  •  ondansetron **FOLLOWED BY** [START ON 2020] ondansetron  •  Pharmacy Consult  •  phenol  •  promethazine **OR** promethazine  •  simethicone  •  sodium chloride    Assessment/Plan   Assessment & Plan     Active Hospital Problems    Diagnosis  POA   • **Incarcerated hernia [K46.0]   Unknown   • Lupus (CMS/HCC) [M32.9]  Unknown   • History of cervical cancer [Z85.41]  Not Applicable   • XI (acute kidney injury) (CMS/HCC) [N17.9]  Unknown   • Tobacco abuse [Z72.0]  Yes      Resolved Hospital Problems    Diagnosis Date Resolved POA   • History of stomach cancer [Z85.028] 06/14/2020 Unknown        Brief Hospital Course to date:  Ro Walker is a 70 y.o. female with past medical history of lupus on chronic Plaquenil chronic smoker, history of cervical cancer who presents to outside ER with complaints of nausea, vomiting, no bowel movement since 6/8, and left inguinal pain and swelling. Diagnosed with incarcerated inguinal hernia and taken to the OR 6/14.     This patient's problems and plans were partially entered by my partner and updated as appropriate by me 06/16/20.    Incarcerated inguinal hernia s/p OR 6/14   - OR with Dr. Lara 6/14  - PRN pain control; PPI   - Continue zosyn     SBO  - NG placed during surgery, now removed  - Continue IV fluids;  ambulate as able. Diet as per Dr. Lara  --repeat CT A/P appears c/w obstruction post-op as well. Now NPO.  TPN ordered as per Dr. Lara  --CT A/P also concerning for possible LLE DVT, so venous duplex ordered.    Acute blood loss anemia   Iron deficiency anemia  - Initial H/H 13.5; was 7.7 on 6/15 s/p two units PRBCs- likely hemo-concentrated on admission then perioperative blood loss.  --H&H better today, monitor and transfuse PRN HgB<7 or for symptomatic anemia  - Venous oozing noted intra-op likely from medial femoral vein which was repaired   -  iron low; ferrlecit once per surgery recs     XI   - pre-renal; improved with fluids     Lupus with chronic immunosuppression on Plaquenil   - holding plaquenil; resume when appropriate and taking PO     History of cervical cancer   --s/p laparascopic assisted vaginal hysterectomy w/ BSO by Dr. Dietrich in  February 2018    Family h/o gastric cancer (sister)    Tobacco abuse     Elevated  procalcitonin  --ID consulted by Dr. Lara.  Dr. Quinteros following. Continuing Zosyn. Likely continue ABX until 6/28 with plans to transition to Ertapenem when pt is discharged.    --Blood Cx, UCx PENDING, Respiratory Cx NGTD, CXR with ? ARDS  --I personally reviewed CT A/P and CT Chest, but CT chest does not appear c/w ARDS to me. Final reads as above.   --no leukocytosis nor left shift, afebrile      DVT Prophylaxis:  lovenox    Disposition: I expect the patient to be discharged TBD -- pending improvement     CODE STATUS:   Code Status and Medical Interventions:   Ordered at: 06/13/20 2970     Code Status:    CPR     Medical Interventions (Level of Support Prior to Arrest):    Full         Electronically signed by Ellen Mendez MD, 06/16/20, 08:05.

## 2020-06-16 NOTE — PROGRESS NOTES
Pharmacy Parenteral Nutrition Evaluation  Ro Walker is a  70 y.o. female receiving PPN.     Indication: bowel obstruction  Consulting Physician: Marco    Labs  Results from last 7 days   Lab Units 06/16/20  0630 06/15/20  0853 06/14/20  0619   SODIUM mmol/L 135* 136 137   POTASSIUM mmol/L 3.8 4.0 4.2   CHLORIDE mmol/L 99 101 99   CO2 mmol/L 26.0 27.0 24.0   BUN mg/dL 15 17 33*   CREATININE mg/dL 0.69 0.67 0.97   CALCIUM mg/dL 7.9* 7.8* 7.5*   BILIRUBIN mg/dL 0.7 0.3 0.7   ALK PHOS U/L 38* 32* 32*   ALT (SGPT) U/L 13 10 8   AST (SGOT) U/L 28 26 16   GLUCOSE mg/dL 89 120* 111*     Results from last 7 days   Lab Units 06/14/20  0619   PREALBUMIN mg/dL 10.0*     Results from last 7 days   Lab Units 06/16/20  0630 06/16/20  0209 06/15/20  0853 06/14/20  1249   WBC 10*3/mm3 4.79  --  6.22 6.20   HEMOGLOBIN g/dL 9.7* 9.1* 7.7* 8.6*   HEMATOCRIT % 30.3* 27.6* 24.4* 27.1*   PLATELETS 10*3/mm3 94*  --  99* 96*     Triglycerides   Date Value Ref Range Status   06/16/2020 96 0 - 150 mg/dL Final     Comment:     Falsely depressed results may occur on samples drawn from patients receiving N-Acetylcysteine (NAC) or Metamizole.     estimated creatinine clearance is 48.7 mL/min (by C-G formula based on SCr of 0.69 mg/dL).    Intake & Output (last 3 days)         06/13 0701 - 06/14 0700 06/14 0701 - 06/15 0700 06/15 0701 - 06/16 0700 06/16 0701 - 06/17 0700    P.O.   600 240    I.V. (mL/kg) 2000 (41.2) 718 (14.8) 754 (15.5)     Blood   550     IV Piggyback 250       Total Intake(mL/kg) 2250 (46.4) 718 (14.8) 1904 (39.3) 240 (4.9)    Urine (mL/kg/hr) 225 500 (0.4) 700 (0.6)     Emesis/NG output 725       Total Output 950 500 700     Net +1300 +218 +1204 +240            Urine Unmeasured Occurrence  6 x  1 x          Dietitian Recommendations  Diet Orders (active) (From admission, onward)       Start     Ordered    06/16/20 1800  Adult Peripheral 2-in-1 TPN  Cyclic TPN - See Admin Instructions     Note to Pharmacy:  Ro Walker  Y   1950  [facility] [id number]  [id number]    20 1215    20 1800  fat emulsion (INTRALIPID,LIPOSYN) 20 % infusion emulsion 250 mL  Every 24 Hours Scheduled (TPN)      20 1215    20 0906  NPO Diet NPO Except: Ice Chips  Diet Effective Now     Comments:  And meds po    20 0906    06/15/20 1233  Dietary Nutrition Supplements Other (See Comment); Premier Protein  Daily With Breakfast      06/15/20 1233    06/15/20 0848  DIET MESSAGE Please send new diet order tray, full liquid, thanks  Once     Comments:  Please send new diet order tray, full liquid, thanks    06/15/20 0847    20 0800  Snack: Chewing Gum Three Times Daily x30 Minutes to Increase Saliva Flow and Provide Gas Pain Relief  3 Times Daily     Comments:  Chewing Gum Three Times Daily x30 Minutes to Increase Saliva Flow and Provide Gas Pain Relief    20 0249                  Current PN Regimen Recommendation:  Dextrose 9% / Amino Acid 2.5% at goal rate of 100 mL/hr.  20% Lipid Emulsion 250mL every 24 hours.    Assessment/Plan:  1. Pharmacy to dose peripheral PN for a bowel obstruction in this 70yoF.   2. Macronutrient recommendations per RD. 9%D, 2.5%AA. Will start PPN @ 50mL/hr and advance to goal rate of 100mL/hr after 6 hours.   3. Pt is not diabetic, BG range  during this admission. Will follow in case SSI is necessary.  4. Standard electrolyte concentrations ordered for now - - will replace exogenously as needed (protocol ordered).   5. Pharmacy will continue to follow and adjust PPN as appropriate.    Judi Tran [facility]  2020  12:17

## 2020-06-16 NOTE — PROGRESS NOTES
"General Surgery     LOS: 3 days   Patient Care Team:  Tracy Schrader MD as PCP - General (Pediatrics)  aMría Dietrich MD as Consulting Physician (Gynecologic Oncology)  David Allen MD as Consulting Physician (Rheumatology)    Chief Complaint:  POD #3    Subjective     Interval History:  Doing well.  No complaints.  Passed lots of flatus, no BM yet.  No n/v.      Denies N/V.  No fevers.  Pain controlled.  Ambulating.  Voiding.   observed, 750 with coaching and education.  Did not ambulate b/c recent dx of DVT.  Denies n/v.    CT scan last night concerning for ileus vs. SBO, and concern for L DVT given subcutaneous edema on the left.   Duplex showed left acute DVT in common and deep femoral veins, also superficial thrombophlebitis in the sapheno-femoral junction.       Objective     Vital Signs  Blood pressure 143/61, pulse 83, temperature 98 °F (36.7 °C), temperature source Oral, resp. rate 18, height 154.9 cm (61\"), weight 48.5 kg (107 lb), SpO2 90 %.    Physical Exam:  General: Alert, NAD  Lungs: Clear  Heart: RRR  Abdomen: soft, appropriate, midline incision okay without drainage or erythema.  Normoactive bowel sounds.  Extremities: warm,  edema LLE>RLE.  No evidence of phlegmasia cerulea dolens.  Erythema on interior thigh on left, in distribution of greater saphenous vein.     Results Review:     I reviewed the patient's new clinical results.  I reviewed the patient's new imaging results and agree with the interpretation.    Labs:  Lab Results (last 24 hours)     Procedure Component Value Units Date/Time    Urine Culture - Urine, Urine, Clean Catch [[id number]]  (Normal) Collected:  06/15/20 1422    Specimen:  Urine, Clean Catch Updated:  06/16/20 1153     Urine Culture No growth    Triglycerides [[id number]]  (Normal) Collected:  06/16/20 0630    Specimen:  Blood Updated:  06/16/20 1032     Triglycerides 96 mg/dL      Comment: Falsely depressed results may occur on samples drawn from patients " receiving N-Acetylcysteine (NAC) or Metamizole.       Narrative:       Triglyceride Reference Ranges:    Normal           less than 150 mg/dL  Borderline       150-199 mg/dL  High             200-499 mg/dL  Very High        greater than 499 mg/dL    Respiratory Culture - Sputum, Cough [[id number]] Collected:  06/15/20 1837    Specimen:  Sputum from Cough Updated:  06/16/20 0940     Respiratory Culture No growth     Gram Stain Many (4+) WBCs per low power field      Moderate (3+) Epithelial cells per low power field      No organisms seen    CBC & Differential [[id number]] Collected:  06/16/20 0630    Specimen:  Blood Updated:  06/16/20 0755    Narrative:       The following orders were created for panel order CBC & Differential.  Procedure                               Abnormality         Status                     ---------                               -----------         ------                     CBC Auto Differential[[id number]]        Abnormal            Final result                 Please view results for these tests on the individual orders.    CBC Auto Differential [[id number]]  (Abnormal) Collected:  06/16/20 0630    Specimen:  Blood Updated:  06/16/20 0755     WBC 4.79 10*3/mm3      RBC 3.25 10*6/mm3      Hemoglobin 9.7 g/dL      Hematocrit 30.3 %      MCV 93.2 fL      MCH 29.8 pg      MCHC 32.0 g/dL      RDW 13.3 %      RDW-SD 45.7 fl      MPV 12.9 fL      Platelets 94 10*3/mm3      Neutrophil % 72.1 %      Lymphocyte % 18.8 %      Monocyte % 8.1 %      Eosinophil % 0.0 %      Basophil % 0.2 %      Immature Grans % 0.8 %      Neutrophils, Absolute 3.45 10*3/mm3      Lymphocytes, Absolute 0.90 10*3/mm3      Monocytes, Absolute 0.39 10*3/mm3      Eosinophils, Absolute 0.00 10*3/mm3      Basophils, Absolute 0.01 10*3/mm3      Immature Grans, Absolute 0.04 10*3/mm3      nRBC 0.0 /100 WBC     Narrative:       Appended report. These results have been appended to a previously verified report.    Scan Slide  "[[id number]]  (Normal) Collected:  06/16/20 0630    Specimen:  Blood Updated:  06/16/20 0755     RBC Morphology Normal     WBC Morphology Normal     Platelet Morphology Normal    Procalcitonin [[id number]]  (Abnormal) Collected:  06/16/20 0630    Specimen:  Blood Updated:  06/16/20 0730     Procalcitonin 2.06 ng/mL     Narrative:       As a Marker for Sepsis (Non-Neonates):   1. <0.5 ng/mL represents a low risk of severe sepsis and/or septic shock.  1. >2 ng/mL represents a high risk of severe sepsis and/or septic shock.    As a Marker for Lower Respiratory Tract Infections that require antibiotic therapy:  PCT on Admission     Antibiotic Therapy             6-12 Hrs later  > 0.5                Strongly Recommended            >0.25 - <0.5         Recommended  0.1 - 0.25           Discouraged                   Remeasure/reassess PCT  <0.1                 Strongly Discouraged          Remeasure/reassess PCT      As 28 day mortality risk marker: \"Change in Procalcitonin Result\" (> 80 % or <=80 %) if Day 0 (or Day 1) and Day 4 values are available. Refer to http://www.eventuosity-pct-calculator.com/   Change in PCT <=80 %   A decrease of PCT levels below or equal to 80 % defines a positive change in PCT test result representing a higher risk for 28-day all-cause mortality of patients diagnosed with severe sepsis or septic shock.  Change in PCT > 80 %   A decrease of PCT levels of more than 80 % defines a negative change in PCT result representing a lower risk for 28-day all-cause mortality of patients diagnosed with severe sepsis or septic shock.                Results may be falsely decreased if patient taking Biotin.     Comprehensive Metabolic Panel [[id number]]  (Abnormal) Collected:  06/16/20 0630    Specimen:  Blood Updated:  06/16/20 0727     Glucose 89 mg/dL      BUN 15 mg/dL      Creatinine 0.69 mg/dL      Sodium 135 mmol/L      Potassium 3.8 mmol/L      Chloride 99 mmol/L      CO2 26.0 mmol/L      Calcium 7.9 mg/dL  "     Total Protein 5.6 g/dL      Albumin 3.00 g/dL      ALT (SGPT) 13 U/L      AST (SGOT) 28 U/L      Alkaline Phosphatase 38 U/L      Total Bilirubin 0.7 mg/dL      eGFR Non African Amer 84 mL/min/1.73      Globulin 2.6 gm/dL      A/G Ratio 1.2 g/dL      BUN/Creatinine Ratio 21.7     Anion Gap 10.0 mmol/L     Narrative:       GFR Normal >60  Chronic Kidney Disease <60  Kidney Failure <15      Lactic Acid, Plasma [[id number]]  (Normal) Collected:  06/16/20 0645    Specimen:  Blood Updated:  06/16/20 0719     Lactate 1.0 mmol/L      Comment: Falsely depressed results may occur on samples drawn from patients receiving N-Acetylcysteine (NAC) or Metamizole.       Hemoglobin & Hematocrit, Blood [[id number]]  (Abnormal) Collected:  06/16/20 0209    Specimen:  Blood Updated:  06/16/20 0219     Hemoglobin 9.1 g/dL      Hematocrit 27.6 %     Blood Culture - Blood, Hand, Right [[id number]] Collected:  06/15/20 1711    Specimen:  Blood from Hand, Right Updated:  06/15/20 1725    Blood Culture - Blood, Arm, Left [[id number]] Collected:  06/15/20 1711    Specimen:  Blood from Arm, Left Updated:  06/15/20 1725    Urinalysis With Microscopic If Indicated (No Culture) - Urine, Clean Catch [[id number]]  (Abnormal) Collected:  06/15/20 1422    Specimen:  Urine, Clean Catch Updated:  06/15/20 1537     Color, UA Yellow     Appearance, UA Cloudy     pH, UA 5.5     Specific Gravity, UA 1.025     Glucose, UA Negative     Ketones, UA 15 mg/dL (1+)     Bilirubin, UA Negative     Blood, UA Moderate (2+)     Protein, UA 30 mg/dL (1+)     Leuk Esterase, UA Small (1+)     Nitrite, UA Negative     Urobilinogen, UA 0.2 E.U./dL    Urinalysis, Microscopic Only - Urine, Clean Catch [[id number]]  (Abnormal) Collected:  06/15/20 1422    Specimen:  Urine, Clean Catch Updated:  06/15/20 1537     RBC, UA 3-6 /HPF      WBC, UA 13-20 /HPF      Bacteria, UA None Seen /HPF      Squamous Epithelial Cells, UA 0-2 /HPF      Yeast, UA Small/1+ Yeast /HPF      Hyaline  Casts, UA 0-6 /LPF      Methodology Manual Light Microscopy          Imaging:  Imaging Results (Last 24 Hours)     Procedure Component Value Units Date/Time    CT Chest With Contrast [[id number]] Collected:  06/16/20 0817     Updated:  06/16/20 0953    Narrative:       EXAMINATION: CT CHEST W CONTRAST-, CT ABDOMEN AND PELVIS W CONTRAST-      INDICATION: Postop bandemia, procalcitonin elevation, smoker, SLE;  K40.90-Unilateral inguinal hernia, without obstruction or gangrene, not  specified as recurrent.      TECHNIQUE: CT chest, abdomen and pelvis with intravenous contrast.     The radiation dose reduction device was turned on for each scan per the  ALARA (As Low as Reasonably Achievable) protocol.     COMPARISON: Chest x-ray dated 06/15/2020.     FINDINGS: CHEST: Thyroid is homogeneous in attenuation. No bulky  mediastinal adenopathy. Central airways are patent. Esophagus in normal  course and caliber. Patent nonaneurysmal thoracic aorta with patent  great vessel origins. Central pulmonary arteries are grossly patent.  Cardiac size borderline enlarged without pericardial effusion. Extended  lung windows demonstrate opacifications at the lung bases right greater  than left with air bronchograms present and trace volume right greater  than left bilateral pleural effusions. Considerations for atelectasis  versus airspace disease intermixed with healed granulomatous  involvement. Degenerative changes of the thoracic spine without  aggressive osseous lesion. No soft tissue body wall findings of concern.     ABDOMEN AND PELVIS: Liver demonstrates mild diffuse low-attenuation of  hepatic steatosis with several subcentimeter areas of likely cystic  lesions too small to characterize. Gallbladder is folded in  configuration without cholelithiasis clearly evident. No biliary  dilatation. Pancreas and spleen unremarkable. Adrenals without distinct  nodule. Kidneys without hydronephrosis or hydroureter. No bulky  retroperitoneal  adenopathy. Atherosclerotic abdominal aorta measuring up  to 3.7 cm aneurysmal in the infrarenal portion. Portal veins and IVC  patent. GI tract evaluation demonstrates moderate distended gastric  lumen with air-fluid level. Dilated fluid-filled bowel in the left  midabdomen extending to the lower midabdomen there is an apparent  transition point at the noted anastomosis. Limited evaluation for  extraluminal contact given obstructive bowel gas pattern limiting flow  of intraluminal contrast, however, there is postsurgical change of the  intraabdominal wall and extraluminal fluid in the left lower quadrant  anteriorly with mixed gas locules, however no definitive abscess is  clearly evident despite trace volume ascites. Pelvic viscera otherwise  unremarkable. Degenerative changes of the spine without aggressive  osseous lesion. Asymmetric edema within the left proximal thigh, may  represent postsurgical change, however, underlying deep vein thrombosis  could have a similar appearance.       Impression:       1. Dilated fluid-filled small bowel within the left midabdomen extending  to the lower midabdomen where there is anastomosis in the right lower  midabdomen concerning for transition point and obstructive or at least  partially obstructive gas pattern with postsurgical changes including  extraluminal gas and fluid which should be correlated with surgical  history to evaluate for appropriate time point, however no definitive  peripherally enhancing focal fluid collection to suggest abscess,  however, likely premature for development of definitive abscess given  postsurgical changes. Limited evaluation of intraluminal contrast extent  of involvement due to obstructive bowel gas pattern.  2. Opacifications of the lung bases right greater than left of moderate  to severe atelectatic changes of subsegmental involvement with trace  bilateral pleural effusions.  3. Asymmetric edema within the lower left abdomen and left  thigh body  wall tissues may represent postsurgical change given left inguinal soft  tissue gas, however, underlying deep vein thrombosis could have a  similar appearance and may be further evaluated with duplex.     D:  06/16/2020  E:  06/16/2020       CT Abdomen Pelvis With Contrast [[id number]] Collected:  06/16/20 0817     Updated:  06/16/20 0953    Narrative:       EXAMINATION: CT CHEST W CONTRAST-, CT ABDOMEN AND PELVIS W CONTRAST-      INDICATION: Postop bandemia, procalcitonin elevation, smoker, SLE;  K40.90-Unilateral inguinal hernia, without obstruction or gangrene, not  specified as recurrent.      TECHNIQUE: CT chest, abdomen and pelvis with intravenous contrast.     The radiation dose reduction device was turned on for each scan per the  ALARA (As Low as Reasonably Achievable) protocol.     COMPARISON: Chest x-ray dated 06/15/2020.     FINDINGS: CHEST: Thyroid is homogeneous in attenuation. No bulky  mediastinal adenopathy. Central airways are patent. Esophagus in normal  course and caliber. Patent nonaneurysmal thoracic aorta with patent  great vessel origins. Central pulmonary arteries are grossly patent.  Cardiac size borderline enlarged without pericardial effusion. Extended  lung windows demonstrate opacifications at the lung bases right greater  than left with air bronchograms present and trace volume right greater  than left bilateral pleural effusions. Considerations for atelectasis  versus airspace disease intermixed with healed granulomatous  involvement. Degenerative changes of the thoracic spine without  aggressive osseous lesion. No soft tissue body wall findings of concern.     ABDOMEN AND PELVIS: Liver demonstrates mild diffuse low-attenuation of  hepatic steatosis with several subcentimeter areas of likely cystic  lesions too small to characterize. Gallbladder is folded in  configuration without cholelithiasis clearly evident. No biliary  dilatation. Pancreas and spleen unremarkable.  Adrenals without distinct  nodule. Kidneys without hydronephrosis or hydroureter. No bulky  retroperitoneal adenopathy. Atherosclerotic abdominal aorta measuring up  to 3.7 cm aneurysmal in the infrarenal portion. Portal veins and IVC  patent. GI tract evaluation demonstrates moderate distended gastric  lumen with air-fluid level. Dilated fluid-filled bowel in the left  midabdomen extending to the lower midabdomen there is an apparent  transition point at the noted anastomosis. Limited evaluation for  extraluminal contact given obstructive bowel gas pattern limiting flow  of intraluminal contrast, however, there is postsurgical change of the  intraabdominal wall and extraluminal fluid in the left lower quadrant  anteriorly with mixed gas locules, however no definitive abscess is  clearly evident despite trace volume ascites. Pelvic viscera otherwise  unremarkable. Degenerative changes of the spine without aggressive  osseous lesion. Asymmetric edema within the left proximal thigh, may  represent postsurgical change, however, underlying deep vein thrombosis  could have a similar appearance.       Impression:       1. Dilated fluid-filled small bowel within the left midabdomen extending  to the lower midabdomen where there is anastomosis in the right lower  midabdomen concerning for transition point and obstructive or at least  partially obstructive gas pattern with postsurgical changes including  extraluminal gas and fluid which should be correlated with surgical  history to evaluate for appropriate time point, however no definitive  peripherally enhancing focal fluid collection to suggest abscess,  however, likely premature for development of definitive abscess given  postsurgical changes. Limited evaluation of intraluminal contrast extent  of involvement due to obstructive bowel gas pattern.  2. Opacifications of the lung bases right greater than left of moderate  to severe atelectatic changes of subsegmental  involvement with trace  bilateral pleural effusions.  3. Asymmetric edema within the lower left abdomen and left thigh body  wall tissues may represent postsurgical change given left inguinal soft  tissue gas, however, underlying deep vein thrombosis could have a  similar appearance and may be further evaluated with duplex.     D:  06/16/2020  E:  06/16/2020       XR Chest 1 View [[id number]] Collected:  06/15/20 1711     Updated:  06/16/20 0843    Narrative:       EXAMINATION: XR CHEST 1 VW-06/15/2020:     INDICATION: Sepsis; K40.90-Unilateral inguinal hernia, without  obstruction or gangrene, not specified as recurrent.     COMPARISON: 01/07/2018.     FINDINGS: The heart shadow is borderline enlarged. There is increased  diffuse pulmonary interstitial disease that may reflect interstitial  edema or changes of ARDS. There is very mild bibasilar discoid  atelectasis. No other focal lung disease is seen. Biapical pleural  thickening, right greater than left, is smooth-margined and stable.       Impression:       1.  Interval development of diffuse interstitial disease, resembling  early changes of interstitial edema, possibly ARDS. By history, chest CT  scan has been ordered for later today.  2.  Mild bibasilar discoid atelectasis.      D:  06/15/2020  E:  06/16/2020                  Assessment/Plan     POD # 3 s/p open small bowel resection and left preperitoneal inguinal hernia repair with mesh.    CT scan showed some free fluid of unknown significance, also likely ileus vs. SBO at anastomosis. Has had return of bowel function and normoactive bowel sounds today.  KUB in AM.  If normal, may advance to liquid diet.     Concern for left DVT on CT scan, and follow up Duplex ultrasound did show left femoral DVT and sapheno-femoral superficial thrombophlebitis, which explains the left interior thigh erythema.  Starting on therapeutic dose Lovenox today.  When taking oral intake, transition to oral therapeutic  anticoagulation per hospitalist team.    Acute blood loss anemia: stable s/p 2 U PRBCs.    Afebrile.  On empiric abx.  Procalcitonin decreasing.  No leukocytosis.  Appreciate all consultants.    Continue OOB/ PT.          Itzel Mcmanus MD  06/16/20  12:41

## 2020-06-16 NOTE — PROGRESS NOTES
INFECTIOUS DISEASE Progress Note    Ro Walker  1950  [id number]    Consult: 6/16/20  Admit date: 6/13/2020    Requesting Provider: Andra Raines MD  Evaluating physician: Jose De Jesus Quinteros MD  Reason for Consultation: necrotic bowel, left inguinal cellulitis, infected hematoma  Chief Complaint: above      Subjective   History of present illness:  Patient is a 70 y.o.  Yr old female with SLE (on Plaquenil), carcinoma in situe cervix, smoker, with increased left groin lump since 6/10/20 which became increasingly tender.  Initially seen at [facility] ED where CT revealed incarderated small bowel and left inguinal hernia.  Transferred to [facility] and went to surgery by Dr. Huy Lara on 6/14/20 where he found necrotic bowel, and probable infected hematoma.  The patient was placed on zosyn.  She was confused off and on post surgery.  Procalcitonin level 3.67.  No positive cultures.  Had persistent bandemia and fever postop.  I was consulted 6/15/20.    6/16/2020 history reviewed.  On Zosyn for intra-abdominal infection after incarcerated bowel with resection of necrotic bowel on 6/14/2020.  Left inguinal hernia repair with mesh.  Some abdominal pain.  No high fever.  Lactic acid was normal today along with a decrease in procalcitonin to 2.06.  White blood cell count 4.79 with a hemoglobin of 9.7.  Platelets 94,000.  Blood cultures x2 from 6/15-, respiratory culture negative from 6/15.  Urinalysis did have 13-20 WBCs.  Urine culture negative to date.    Past Medical History:   Diagnosis Date   • Cervical cancer (CMS/HCC)    • Lupus (CMS/HCC)    • Polyp of vocal cord        Past Surgical History:   Procedure Laterality Date   • CATARACT EXTRACTION  2008   • CERVICAL CONE BIOPSY     • CERVICAL CONE BIOPSY     • COLON RESECTION SMALL BOWEL N/A 6/13/2020    Procedure: COLON RESECTION SMALL BOWEL;  Surgeon: Huy Lara MD;  Location: [facility];  Service: General;  Laterality: N/A;   • INGUINAL HERNIA REPAIR Left  6/13/2020    Procedure: INGUINAL HERNIA REPAIR LEFT;  Surgeon: Huy Lara MD;  Location:  [facility];  Service: General;  Laterality: Left;   • LAPAROSCOPIC ASSISTED VAGINAL HYSTERECTOMY N/A 2/26/2018    Procedure: LAPAROSCOPIC ASSISTED VAGINAL HYSTERECTOMY, BILATERAL SALPINGO OOPHORECTOMY;  Surgeon: María Dietrich MD;  Location:  [facility];  Service:    • RETINAL DETACHMENT REPAIR  2006   • THROAT SURGERY  2017   • TUBAL ABDOMINAL LIGATION         Pediatric History   Patient Guardian Status   • Not on file     Other Topics Concern   • Not on file   Social History Narrative   • Not on file   Smoker x 55 y, 0.5-1 ppd.  No drug or etoh.    family history includes Cancer in her brother and brother; Stomach cancer in her sister.    No Known Allergies      There is no immunization history on file for this patient.    Medication:    Current Facility-Administered Medications:   •  acetaminophen (TYLENOL) tablet 650 mg, 650 mg, Oral, Q4H PRN **OR** acetaminophen (TYLENOL) 160 MG/5ML solution 650 mg, 650 mg, Oral, Q4H PRN **OR** acetaminophen (TYLENOL) suppository 650 mg, 650 mg, Rectal, Q4H PRN, Huy Lara MD  •  albuterol (PROVENTIL) nebulizer solution 0.083% 2.5 mg/3mL, 2.5 mg, Nebulization, Q6H While Awake - RT, Huy Lara MD, 2.5 mg at 06/16/20 0717  •  diphenhydrAMINE (BENADRYL) injection 25 mg, 25 mg, Intravenous, Q4H PRN, Huy Lara MD  •  [START ON 6/17/2020] enoxaparin (LOVENOX) syringe 40 mg, 40 mg, Subcutaneous, Q24H, Ellen Mendez MD  •  HYDROmorphone (DILAUDID) injection 0.25 mg, 0.25 mg, Intravenous, Once, Huy Lara MD  •  HYDROmorphone (DILAUDID) injection 1 mg, 1 mg, Intravenous, Q2H PRN **AND** naloxone (NARCAN) injection 0.1 mg, 0.1 mg, Intravenous, Q5 Min PRN, Huy Lara MD  •  LORazepam (ATIVAN) injection 0.5 mg, 0.5 mg, Intravenous, Q12H PRN, Huy Lara MD  •  Magnesium Sulfate 2 gram Bolus, followed by 8 gram infusion  (total Mg dose 10 grams)- Mg less than or equal to 1mg/dL, 2 g, Intravenous, PRN **OR** Magnesium Sulfate 2 gram / 50mL Infusion (GIVE X 3 BAGS TO EQUAL 6GM TOTAL DOSE) - Mg 1.1 - 1.5 mg/dl, 2 g, Intravenous, PRN **OR** Magnesium Sulfate 4 gram infusion- Mg 1.6-1.9 mg/dL, 4 g, Intravenous, PRN, Huy Lara MD  •  Morphine sulfate (PF) injection 4 mg, 4 mg, Intravenous, Q2H PRN, 4 mg at 20 **AND** naloxone (NARCAN) injection 0.4 mg, 0.4 mg, Intravenous, Q5 Min PRN, Huy Lara MD  •  [] HYDROmorphone (DILAUDID) injection 0.25 mg, 0.25 mg, Intravenous, Q2H PRN **AND** naloxone (NARCAN) injection 0.4 mg, 0.4 mg, Intravenous, Q5 Min PRN, Huy Lara MD  •  ondansetron (ZOFRAN) injection 4 mg, 4 mg, Intravenous, Q6H PRN **FOLLOWED BY** [START ON 2020] ondansetron (ZOFRAN) tablet 4 mg, 4 mg, Oral, Q6H PRN, Huy Lara MD  •  pantoprazole (PROTONIX) injection 40 mg, 40 mg, Intravenous, Q AM, Huy Lara MD, 40 mg at 20 0543  •  Pharmacy Consult, , Does not apply, Continuous PRN, Huy Lara MD  •  Pharmacy to Dose TPN, , Does not apply, Continuous PRN, Huy Lara MD  •  phenol (CHLORASEPTIC) 1.4 % liquid 2 spray, 2 spray, Mouth/Throat, Q2H PRN, Huy Lara MD  •  piperacillin-tazobactam (ZOSYN) 3.375 g in iso-osmotic dextrose 50 ml (premix), 3.375 g, Intravenous, Q8H, Huy Lara MD, 3.375 g at 20 0734  •  potassium chloride 10 mEq in 100 mL IVPB, 10 mEq, Intravenous, Q1H PRN, Huy Lara MD  •  potassium phosphate 45 mmol in sodium chloride 0.9 % 500 mL infusion, 45 mmol, Intravenous, PRN **OR** potassium phosphate 30 mmol in sodium chloride 0.9 % 250 mL infusion, 30 mmol, Intravenous, PRN **OR** potassium phosphate 15 mmol in sodium chloride 0.9 % 100 mL infusion, 15 mmol, Intravenous, PRN **OR** sodium phosphates 45 mmol in sodium chloride 0.9 % 250 mL IVPB, 45 mmol, Intravenous, PRN **OR**  sodium phosphates 30 mmol in sodium chloride 0.9 % 250 mL IVPB, 30 mmol, Intravenous, PRN **OR** sodium phosphates 15 mmol in sodium chloride 0.9 % 250 mL IVPB, 15 mmol, Intravenous, PRN, Huy Lara MD  •  promethazine (PHENERGAN) injection 12.5 mg, 12.5 mg, Intravenous, Q6H PRN **OR** promethazine (PHENERGAN) injection 12.5 mg, 12.5 mg, Intramuscular, Q6H PRN, Huy Lara MD  •  simethicone (MYLICON) chewable tablet 80 mg, 80 mg, Oral, 4x Daily PRN, Huy Lara MD  •  sodium chloride 0.45 % infusion, 75 mL/hr, Intravenous, Continuous, Huy Lara MD, Last Rate: 75 mL/hr at 06/16/20 0209, 75 mL/hr at 06/16/20 0209  •  sodium chloride 0.9 % flush 10 mL, 10 mL, Intravenous, Q12H, Huy Lara MD, 10 mL at 06/15/20 2035  •  sodium chloride 0.9 % flush 10 mL, 10 mL, Intravenous, PRN, Huy Lara MD  •  sodium chloride 0.9 % infusion, 250 mL/hr, Intravenous, Continuous, Huy Lara MD, Last Rate: 250 mL/hr at 06/16/20 0938, 250 mL/hr at 06/16/20 0938    Please refer to the medical record for a full medication list    Review of Systems:    Constitutional-- No Fever, chills or sweats.  Appetite good, and no malaise. No fatigue.  HEENT-- No new vision, hearing or throat complaints.  No epistaxis or oral sores.  Denies odynophagia or dysphagia.  No odynophagia or dysphagia. No headache, photophobia or neck stiffness.  CV-- No chest pain, palpitation or syncope  Resp-- No SOB/cough/Hemoptysis  GI- No nausea, vomiting, or diarrhea.  No hematochezia, melena, or hematemesis. Denies jaundice or chronic liver disease.  Minimal abdominal pain.  -- No dysuria, hematuria, or flank pain.  Denies hesitancy, urgency or flank pain.  Lymph- no swollen lymph nodes in neck/axilla or groin.   Heme- No active bruising or bleeding; no Hx of DVT or PE.  MS-- no swelling or pain in the bones or joints of arms/legs.  No new back pain.  Neuro-- No acute focal weakness or numbness in  "the arms or legs.  No seizures.  Skin--No rashes or lesions, no nodules    Physical Exam:   Vital Signs   Temp:  [98 °F (36.7 °C)-98.9 °F (37.2 °C)] 98 °F (36.7 °C)  Heart Rate:  [] 83  Resp:  [16-18] 18  BP: ()/(53-69) 143/61    Temp  Min: 98 °F (36.7 °C)  Max: 98.9 °F (37.2 °C)  BP  Min: 97/53  Max: 143/61  Pulse  Min: 72  Max: 110  Resp  Min: 16  Max: 18  SpO2  Min: 90 %  Max: 100 %    Blood pressure 143/61, pulse 83, temperature 98 °F (36.7 °C), temperature source Oral, resp. rate 18, height 154.9 cm (61\"), weight 48.5 kg (107 lb), SpO2 90 %.  GENERAL: Awake and alert, in mild to moderate distress. Appears older than stated age.  HEENT:  Normocephalic, atraumatic.  Oropharynx without thrush. Dentition in good repair. No cervical adenopathy. No neck masses.  Ears externally normal, Nose externally normal.  EYES: No conjunctival injection. No icterus. EOM full.  LYMPHATICS: No lymphadenopathy of the neck or axillary or inguinal regions.   HEART: No murmur, gallop, or pericardial friction rub. Reg rate rhythm, No JVD at 45 degrees.  LUNGS: Few crackles at the bases. No respiratory distress, no use of accessory muscles.  ABDOMEN: Soft, min tenderness left quadrant, nondistended. No appreciable HSM.  Bowel sounds normal.  SKIN: Warm and dry without cutaneous eruptions.  No nodules.  Surg wounds ok.  Midline.  No significant erythema.  Left groin without significant erythema.  PSYCHIATRIC: Mental status lucid. No confusion.  EXT:  No cellulitic change.  NEURO: Oriented to name, nonfocal.      Results Review:   I reviewed the patient's new clinical results.  I reviewed the patient's new imaging results and agree with the interpretation.  I reviewed the patient's other test results and agree with the interpretation    Results from last 7 days   Lab Units 06/16/20  0630 06/16/20  0209 06/15/20  0853 06/14/20  1249   WBC 10*3/mm3 4.79  --  6.22 6.20   HEMOGLOBIN g/dL 9.7* 9.1* 7.7* 8.6*   HEMATOCRIT % 30.3* " 27.6* 24.4* 27.1*   PLATELETS 10*3/mm3 94*  --  99* 96*     Results from last 7 days   Lab Units 06/16/20  0630   SODIUM mmol/L 135*   POTASSIUM mmol/L 3.8   CHLORIDE mmol/L 99   CO2 mmol/L 26.0   BUN mg/dL 15   CREATININE mg/dL 0.69   GLUCOSE mg/dL 89   CALCIUM mg/dL 7.9*     Results from last 7 days   Lab Units 06/16/20  0630   ALK PHOS U/L 38*   BILIRUBIN mg/dL 0.7   ALT (SGPT) U/L 13   AST (SGOT) U/L 28                 Results from last 7 days   Lab Units 06/16/20  0645   LACTATE mmol/L 1.0     Estimated Creatinine Clearance: 48.7 mL/min (by C-G formula based on SCr of 0.69 mg/dL).    Microbiology:  Microbiology Results Abnormal     Procedure Component Value - Date/Time    Respiratory Culture - Sputum, Cough [[id number]] Collected:  06/15/20 1837    Lab Status:  Preliminary result Specimen:  Sputum from Cough Updated:  06/16/20 0940     Respiratory Culture No growth     Gram Stain Many (4+) WBCs per low power field      Moderate (3+) Epithelial cells per low power field      No organisms seen    COVID PRE-OP / PRE-PROCEDURE SCREENING ORDER (NO ISOLATION) - Swab, Nasopharynx [[id number]] Collected:  06/13/20 2334    Lab Status:  Final result Specimen:  Swab from Nasopharynx Updated:  06/14/20 0045    Narrative:       The following orders were created for panel order COVID PRE-OP / PRE-PROCEDURE SCREENING ORDER (NO ISOLATION) - Swab, Nasopharynx.  Procedure                               Abnormality         Status                     ---------                               -----------         ------                     COVID-19,CEPHEID,[facility] IN-...[[id number]]  Normal              Final result                 Please view results for these tests on the individual orders.    COVID-19,CEPHEID,[facility] IN-HOUSE(OR EMERGENT/ADD-ON),NP SWAB IN TRANSPORT MEDIA 3-4 HR TAT - Swab, Nasopharynx [[id number]]  (Normal) Collected:  06/13/20 2334    Lab Status:  Final result Specimen:  Swab from Nasopharynx Updated:  06/14/20 0045      COVID19 Not Detected          Radiology:  Imaging Results (Last 72 Hours)     Procedure Component Value Units Date/Time    CT Chest With Contrast [[id number]] Collected:  06/16/20 0817     Updated:  06/16/20 0953    Narrative:       EXAMINATION: CT CHEST W CONTRAST-, CT ABDOMEN AND PELVIS W CONTRAST-      INDICATION: Postop bandemia, procalcitonin elevation, smoker, SLE;  K40.90-Unilateral inguinal hernia, without obstruction or gangrene, not  specified as recurrent.      TECHNIQUE: CT chest, abdomen and pelvis with intravenous contrast.     The radiation dose reduction device was turned on for each scan per the  ALARA (As Low as Reasonably Achievable) protocol.     COMPARISON: Chest x-ray dated 06/15/2020.     FINDINGS: CHEST: Thyroid is homogeneous in attenuation. No bulky  mediastinal adenopathy. Central airways are patent. Esophagus in normal  course and caliber. Patent nonaneurysmal thoracic aorta with patent  great vessel origins. Central pulmonary arteries are grossly patent.  Cardiac size borderline enlarged without pericardial effusion. Extended  lung windows demonstrate opacifications at the lung bases right greater  than left with air bronchograms present and trace volume right greater  than left bilateral pleural effusions. Considerations for atelectasis  versus airspace disease intermixed with healed granulomatous  involvement. Degenerative changes of the thoracic spine without  aggressive osseous lesion. No soft tissue body wall findings of concern.     ABDOMEN AND PELVIS: Liver demonstrates mild diffuse low-attenuation of  hepatic steatosis with several subcentimeter areas of likely cystic  lesions too small to characterize. Gallbladder is folded in  configuration without cholelithiasis clearly evident. No biliary  dilatation. Pancreas and spleen unremarkable. Adrenals without distinct  nodule. Kidneys without hydronephrosis or hydroureter. No bulky  retroperitoneal adenopathy. Atherosclerotic  abdominal aorta measuring up  to 3.7 cm aneurysmal in the infrarenal portion. Portal veins and IVC  patent. GI tract evaluation demonstrates moderate distended gastric  lumen with air-fluid level. Dilated fluid-filled bowel in the left  midabdomen extending to the lower midabdomen there is an apparent  transition point at the noted anastomosis. Limited evaluation for  extraluminal contact given obstructive bowel gas pattern limiting flow  of intraluminal contrast, however, there is postsurgical change of the  intraabdominal wall and extraluminal fluid in the left lower quadrant  anteriorly with mixed gas locules, however no definitive abscess is  clearly evident despite trace volume ascites. Pelvic viscera otherwise  unremarkable. Degenerative changes of the spine without aggressive  osseous lesion. Asymmetric edema within the left proximal thigh, may  represent postsurgical change, however, underlying deep vein thrombosis  could have a similar appearance.       Impression:       1. Dilated fluid-filled small bowel within the left midabdomen extending  to the lower midabdomen where there is anastomosis in the right lower  midabdomen concerning for transition point and obstructive or at least  partially obstructive gas pattern with postsurgical changes including  extraluminal gas and fluid which should be correlated with surgical  history to evaluate for appropriate time point, however no definitive  peripherally enhancing focal fluid collection to suggest abscess,  however, likely premature for development of definitive abscess given  postsurgical changes. Limited evaluation of intraluminal contrast extent  of involvement due to obstructive bowel gas pattern.  2. Opacifications of the lung bases right greater than left of moderate  to severe atelectatic changes of subsegmental involvement with trace  bilateral pleural effusions.  3. Asymmetric edema within the lower left abdomen and left thigh body  wall tissues may  represent postsurgical change given left inguinal soft  tissue gas, however, underlying deep vein thrombosis could have a  similar appearance and may be further evaluated with duplex.     D:  06/16/2020  E:  06/16/2020       CT Abdomen Pelvis With Contrast [[id number]] Collected:  06/16/20 0817     Updated:  06/16/20 0953    Narrative:       EXAMINATION: CT CHEST W CONTRAST-, CT ABDOMEN AND PELVIS W CONTRAST-      INDICATION: Postop bandemia, procalcitonin elevation, smoker, SLE;  K40.90-Unilateral inguinal hernia, without obstruction or gangrene, not  specified as recurrent.      TECHNIQUE: CT chest, abdomen and pelvis with intravenous contrast.     The radiation dose reduction device was turned on for each scan per the  ALARA (As Low as Reasonably Achievable) protocol.     COMPARISON: Chest x-ray dated 06/15/2020.     FINDINGS: CHEST: Thyroid is homogeneous in attenuation. No bulky  mediastinal adenopathy. Central airways are patent. Esophagus in normal  course and caliber. Patent nonaneurysmal thoracic aorta with patent  great vessel origins. Central pulmonary arteries are grossly patent.  Cardiac size borderline enlarged without pericardial effusion. Extended  lung windows demonstrate opacifications at the lung bases right greater  than left with air bronchograms present and trace volume right greater  than left bilateral pleural effusions. Considerations for atelectasis  versus airspace disease intermixed with healed granulomatous  involvement. Degenerative changes of the thoracic spine without  aggressive osseous lesion. No soft tissue body wall findings of concern.     ABDOMEN AND PELVIS: Liver demonstrates mild diffuse low-attenuation of  hepatic steatosis with several subcentimeter areas of likely cystic  lesions too small to characterize. Gallbladder is folded in  configuration without cholelithiasis clearly evident. No biliary  dilatation. Pancreas and spleen unremarkable. Adrenals without  distinct  nodule. Kidneys without hydronephrosis or hydroureter. No bulky  retroperitoneal adenopathy. Atherosclerotic abdominal aorta measuring up  to 3.7 cm aneurysmal in the infrarenal portion. Portal veins and IVC  patent. GI tract evaluation demonstrates moderate distended gastric  lumen with air-fluid level. Dilated fluid-filled bowel in the left  midabdomen extending to the lower midabdomen there is an apparent  transition point at the noted anastomosis. Limited evaluation for  extraluminal contact given obstructive bowel gas pattern limiting flow  of intraluminal contrast, however, there is postsurgical change of the  intraabdominal wall and extraluminal fluid in the left lower quadrant  anteriorly with mixed gas locules, however no definitive abscess is  clearly evident despite trace volume ascites. Pelvic viscera otherwise  unremarkable. Degenerative changes of the spine without aggressive  osseous lesion. Asymmetric edema within the left proximal thigh, may  represent postsurgical change, however, underlying deep vein thrombosis  could have a similar appearance.       Impression:       1. Dilated fluid-filled small bowel within the left midabdomen extending  to the lower midabdomen where there is anastomosis in the right lower  midabdomen concerning for transition point and obstructive or at least  partially obstructive gas pattern with postsurgical changes including  extraluminal gas and fluid which should be correlated with surgical  history to evaluate for appropriate time point, however no definitive  peripherally enhancing focal fluid collection to suggest abscess,  however, likely premature for development of definitive abscess given  postsurgical changes. Limited evaluation of intraluminal contrast extent  of involvement due to obstructive bowel gas pattern.  2. Opacifications of the lung bases right greater than left of moderate  to severe atelectatic changes of subsegmental involvement with  trace  bilateral pleural effusions.  3. Asymmetric edema within the lower left abdomen and left thigh body  wall tissues may represent postsurgical change given left inguinal soft  tissue gas, however, underlying deep vein thrombosis could have a  similar appearance and may be further evaluated with duplex.     D:  06/16/2020  E:  06/16/2020       XR Chest 1 View [[id number]] Collected:  06/15/20 1711     Updated:  06/16/20 0843    Narrative:       EXAMINATION: XR CHEST 1 VW-06/15/2020:     INDICATION: Sepsis; K40.90-Unilateral inguinal hernia, without  obstruction or gangrene, not specified as recurrent.     COMPARISON: 01/07/2018.     FINDINGS: The heart shadow is borderline enlarged. There is increased  diffuse pulmonary interstitial disease that may reflect interstitial  edema or changes of ARDS. There is very mild bibasilar discoid  atelectasis. No other focal lung disease is seen. Biapical pleural  thickening, right greater than left, is smooth-margined and stable.       Impression:       1.  Interval development of diffuse interstitial disease, resembling  early changes of interstitial edema, possibly ARDS. By history, chest CT  scan has been ordered for later today.  2.  Mild bibasilar discoid atelectasis.      D:  06/15/2020  E:  06/16/2020                IMPRESSION:     1.  Incarcerated small bowel with necrosis, with increased risk for intraabdominal infection with strep, gnr, anaerobes.  2.  Repair with mesh, 6/14/20, of #1.  3.  Encephalopathy, toxic and metabolic related to above.  Improved.  4.  Acute kidney injury on admit w/ Cr 1.3, resolved.  5.  Anemia, chronic disease, and acute post op.  Status post transfusion.  6.  Thrombocytopenia, related to above.  Worse.  Could be related to medications.  7.  Hypocalcemia, 7.9.  8.  Left groin cellulitis on admit.  Resolved.  9.  Potential infected groin/RP hematoma.  On treatment.  10.  Acute hypoxic respiratory failure.  Improved.  11.  Hyponatremia 134,  new.    Plan:    1.  Diagnostically, follow exam, cbc, cmp, crp, follow 6/15/2020 blood cx x 2, urine eval and cx, procalcitonin level and radiographic studies.  Lactic acid.  2.  Therapeutically, continue zosyn awaiting further culture data.  Duration till 6/28/20.  May change to ertapenem to facilitate outpatient treatment unless cultures dictate otherwise.  3.  Wound care.  4.  O2 support as needed.    I discussed the patients findings and my recommendations with patient, family and nursing staff    Our group would be pleased to follow this patient over the course of their hospitalization and assist with outpatient antimicrobial therapy, as indicated.  Further recommendations depend on the results of the cultures and clinical course.    Jose De Jesus Quinteros MD  6/16/2020

## 2020-06-16 NOTE — PLAN OF CARE
Patient still NPO pending a BM has passed gas.  TPN started today for nutrition.  Patient has a DVT in LLE and increased Lovenox to treat DV T.  VSS pt resting peaceful in bed, will continue to monitor.

## 2020-06-17 ENCOUNTER — APPOINTMENT (OUTPATIENT)
Dept: GENERAL RADIOLOGY | Facility: HOSPITAL | Age: 70
End: 2020-06-17

## 2020-06-17 LAB
ALBUMIN SERPL-MCNC: 2.8 G/DL (ref 3.5–5.2)
ALBUMIN/GLOB SERPL: 1.1 G/DL
ALP SERPL-CCNC: 38 U/L (ref 39–117)
ALT SERPL W P-5'-P-CCNC: 12 U/L (ref 1–33)
ANION GAP SERPL CALCULATED.3IONS-SCNC: 9 MMOL/L (ref 5–15)
AST SERPL-CCNC: 25 U/L (ref 1–32)
BACTERIA SPEC RESP CULT: NORMAL
BASOPHILS # BLD AUTO: 0.01 10*3/MM3 (ref 0–0.2)
BASOPHILS NFR BLD AUTO: 0.2 % (ref 0–1.5)
BILIRUB SERPL-MCNC: 0.5 MG/DL (ref 0.2–1.2)
BUN BLD-MCNC: 9 MG/DL (ref 8–23)
BUN/CREAT SERPL: 13.8 (ref 7–25)
CA-I SERPL ISE-MCNC: 1.18 MMOL/L (ref 1.12–1.32)
CALCIUM SPEC-SCNC: 7.8 MG/DL (ref 8.6–10.5)
CHLORIDE SERPL-SCNC: 101 MMOL/L (ref 98–107)
CO2 SERPL-SCNC: 27 MMOL/L (ref 22–29)
CREAT BLD-MCNC: 0.65 MG/DL (ref 0.57–1)
D-LACTATE SERPL-SCNC: 1.1 MMOL/L (ref 0.5–2)
DEPRECATED RDW RBC AUTO: 45.9 FL (ref 37–54)
EOSINOPHIL # BLD AUTO: 0.18 10*3/MM3 (ref 0–0.4)
EOSINOPHIL NFR BLD AUTO: 3.2 % (ref 0.3–6.2)
ERYTHROCYTE [DISTWIDTH] IN BLOOD BY AUTOMATED COUNT: 13.3 % (ref 12.3–15.4)
GFR SERPL CREATININE-BSD FRML MDRD: 90 ML/MIN/1.73
GLOBULIN UR ELPH-MCNC: 2.6 GM/DL
GLUCOSE BLD-MCNC: 158 MG/DL (ref 65–99)
GLUCOSE BLDC GLUCOMTR-MCNC: 132 MG/DL (ref 70–130)
GRAM STN SPEC: NORMAL
HCT VFR BLD AUTO: 29.4 % (ref 34–46.6)
HGB BLD-MCNC: 9.4 G/DL (ref 12–15.9)
IMM GRANULOCYTES # BLD AUTO: 0.05 10*3/MM3 (ref 0–0.05)
IMM GRANULOCYTES NFR BLD AUTO: 0.9 % (ref 0–0.5)
LYMPHOCYTES # BLD AUTO: 0.85 10*3/MM3 (ref 0.7–3.1)
LYMPHOCYTES NFR BLD AUTO: 14.9 % (ref 19.6–45.3)
MAGNESIUM SERPL-MCNC: 1.9 MG/DL (ref 1.6–2.4)
MCH RBC QN AUTO: 30.3 PG (ref 26.6–33)
MCHC RBC AUTO-ENTMCNC: 32 G/DL (ref 31.5–35.7)
MCV RBC AUTO: 94.8 FL (ref 79–97)
MONOCYTES # BLD AUTO: 0.49 10*3/MM3 (ref 0.1–0.9)
MONOCYTES NFR BLD AUTO: 8.6 % (ref 5–12)
NEUTROPHILS # BLD AUTO: 4.11 10*3/MM3 (ref 1.7–7)
NEUTROPHILS NFR BLD AUTO: 72.2 % (ref 42.7–76)
NRBC BLD AUTO-RTO: 0 /100 WBC (ref 0–0.2)
PHOSPHATE SERPL-MCNC: 1.5 MG/DL (ref 2.5–4.5)
PLAT MORPH BLD: NORMAL
PLATELET # BLD AUTO: 95 10*3/MM3 (ref 140–450)
PMV BLD AUTO: 12.1 FL (ref 6–12)
POTASSIUM BLD-SCNC: 3.7 MMOL/L (ref 3.5–5.2)
PROCALCITONIN SERPL-MCNC: 1.16 NG/ML (ref 0.1–0.25)
PROT SERPL-MCNC: 5.4 G/DL (ref 6–8.5)
RBC # BLD AUTO: 3.1 10*6/MM3 (ref 3.77–5.28)
RBC MORPH BLD: NORMAL
SODIUM BLD-SCNC: 137 MMOL/L (ref 136–145)
WBC MORPH BLD: NORMAL
WBC NRBC COR # BLD: 5.69 10*3/MM3 (ref 3.4–10.8)

## 2020-06-17 PROCEDURE — 94799 UNLISTED PULMONARY SVC/PX: CPT

## 2020-06-17 PROCEDURE — 25010000002 CALCIUM GLUCONATE PER 10 ML: Performed by: SURGERY

## 2020-06-17 PROCEDURE — 84100 ASSAY OF PHOSPHORUS: CPT | Performed by: SURGERY

## 2020-06-17 PROCEDURE — 99024 POSTOP FOLLOW-UP VISIT: CPT | Performed by: SURGERY

## 2020-06-17 PROCEDURE — 25010000002 ERTAPENEM PER 500 MG: Performed by: INTERNAL MEDICINE

## 2020-06-17 PROCEDURE — 25010000002 ENOXAPARIN PER 10 MG: Performed by: SURGERY

## 2020-06-17 PROCEDURE — 83735 ASSAY OF MAGNESIUM: CPT | Performed by: SURGERY

## 2020-06-17 PROCEDURE — 25010000002 POTASSIUM CHLORIDE PER 2 MEQ OF POTASSIUM: Performed by: SURGERY

## 2020-06-17 PROCEDURE — 80053 COMPREHEN METABOLIC PANEL: CPT | Performed by: INTERNAL MEDICINE

## 2020-06-17 PROCEDURE — 25010000002 MAGNESIUM SULFATE PER 500 MG OF MAGNESIUM: Performed by: SURGERY

## 2020-06-17 PROCEDURE — 85007 BL SMEAR W/DIFF WBC COUNT: CPT | Performed by: SURGERY

## 2020-06-17 PROCEDURE — 85025 COMPLETE CBC W/AUTO DIFF WBC: CPT | Performed by: SURGERY

## 2020-06-17 PROCEDURE — 82330 ASSAY OF CALCIUM: CPT | Performed by: SURGERY

## 2020-06-17 PROCEDURE — 83605 ASSAY OF LACTIC ACID: CPT | Performed by: INTERNAL MEDICINE

## 2020-06-17 PROCEDURE — 74018 RADEX ABDOMEN 1 VIEW: CPT

## 2020-06-17 PROCEDURE — 84145 PROCALCITONIN (PCT): CPT | Performed by: INTERNAL MEDICINE

## 2020-06-17 PROCEDURE — 99232 SBSQ HOSP IP/OBS MODERATE 35: CPT | Performed by: INTERNAL MEDICINE

## 2020-06-17 PROCEDURE — 82962 GLUCOSE BLOOD TEST: CPT

## 2020-06-17 PROCEDURE — 25010000002 PIPERACILLIN SOD-TAZOBACTAM PER 1 G: Performed by: SURGERY

## 2020-06-17 RX ORDER — HYDROXYCHLOROQUINE SULFATE 200 MG/1
200 TABLET, FILM COATED ORAL
Status: DISCONTINUED | OUTPATIENT
Start: 2020-06-17 | End: 2020-06-19 | Stop reason: HOSPADM

## 2020-06-17 RX ADMIN — ALBUTEROL SULFATE 2.5 MG: 2.5 SOLUTION RESPIRATORY (INHALATION) at 20:02

## 2020-06-17 RX ADMIN — HYDROXYCHLOROQUINE SULFATE 200 MG: 200 TABLET ORAL at 17:01

## 2020-06-17 RX ADMIN — ERTAPENEM SODIUM 1 G: 1 INJECTION, POWDER, LYOPHILIZED, FOR SOLUTION INTRAMUSCULAR; INTRAVENOUS at 12:00

## 2020-06-17 RX ADMIN — CALCIUM GLUCONATE: 94 INJECTION, SOLUTION INTRAVENOUS at 17:01

## 2020-06-17 RX ADMIN — ALBUTEROL SULFATE 2.5 MG: 2.5 SOLUTION RESPIRATORY (INHALATION) at 12:27

## 2020-06-17 RX ADMIN — I.V. FAT EMULSION 250 ML: 20 EMULSION INTRAVENOUS at 17:00

## 2020-06-17 RX ADMIN — POTASSIUM PHOSPHATE, MONOBASIC AND POTASSIUM PHOSPHATE, DIBASIC 30 MMOL: 224; 236 INJECTION, SOLUTION, CONCENTRATE INTRAVENOUS at 05:41

## 2020-06-17 RX ADMIN — TAZOBACTAM SODIUM AND PIPERACILLIN SODIUM 3.38 G: 375; 3 INJECTION, SOLUTION INTRAVENOUS at 00:11

## 2020-06-17 RX ADMIN — ALBUTEROL SULFATE 2.5 MG: 2.5 SOLUTION RESPIRATORY (INHALATION) at 07:14

## 2020-06-17 RX ADMIN — APIXABAN 10 MG: 5 TABLET, FILM COATED ORAL at 17:01

## 2020-06-17 RX ADMIN — TAZOBACTAM SODIUM AND PIPERACILLIN SODIUM 3.38 G: 375; 3 INJECTION, SOLUTION INTRAVENOUS at 08:43

## 2020-06-17 RX ADMIN — ENOXAPARIN SODIUM 50 MG: 60 INJECTION SUBCUTANEOUS at 05:41

## 2020-06-17 RX ADMIN — PANTOPRAZOLE SODIUM 40 MG: 40 INJECTION, POWDER, FOR SOLUTION INTRAVENOUS at 05:41

## 2020-06-17 RX ADMIN — SODIUM CHLORIDE, PRESERVATIVE FREE 10 ML: 5 INJECTION INTRAVENOUS at 08:44

## 2020-06-17 NOTE — DISCHARGE PLACEMENT REQUEST
Ro Walker (70 y.o. Female)     To [facility]  From [facility] at [facility](CM0 [phone]      [facility] 5G  [address] [facility] [address]  Phone:  [phone]  Fax:          Patient:     Ro Walker MRN:  [id number]   [address]  [place] [address] :  1950  SSN:    Phone: [phone] Sex:  F      INSURANCE PAYOR PLAN GROUP # SUBSCRIBER ID   Primary:  Secondary:    [place]ICARE  [place] [id number]  [id number]      [id number]  [id number]   Admitting Diagnosis: Incarcerated hernia [K46.0]  Order Date:  2020         Inpatient Case Management  Consult       (Order ID: [id number])     Diagnosis:         Priority:  Routine Expected Date:   Expiration Date:        Interval:   Count:    Comments: Please arrange for outpatient IV antibiotics with ertapenem 1 g IV daily until 2020.  Check CBC, CMP, CRP weekly while on IV antibiotics.  Fax orders to [phone], call [phone] with final arrangements.  Arrange for follow-up with me in 1 week post discharge.  Reason for Consult? abx     Specimen Type:   Specimen Source:   Specimen Taken Date:   Specimen Taken Time:                   Authorizing Provider:Jose De Jesus Quinteros MD  Authorizing Provider's NPI: [id number]  Order Entered By: Jose De Jesus Quinteros MD 2020  9:27 AM     Electronically signed by: Jose De Jesus Quinteros MD 2020  9:27 AM            Date of Birth Social Security Number Address Home Phone MRN    1950  [address]  [place] [address] [phone] [id number]    Ep[employer] Marital Status          Adventism        Admission Date Admission Type Admitting Provider Attending Provider Department, Room/Bed    20 Urgent Magda Restrepo MD Anciro, Audree, MD [facility] 5G, D448/1    Discharge Date Discharge Disposition Discharge Destination                       Attending Provider:  Magda Restrepo MD    Allergies:  No Known Allergies    Isolation:  None  "  Infection:  None   Code Status:  CPR    Ht:  154.9 cm (61\")   Wt:  48.5 kg (107 lb)    Admission Cmt:  None   Principal Problem:  Incarcerated hernia [K46.0]                 Active Insurance as of 6/13/2020     Primary Coverage     Payor Plan Insurance Group Employer/Plan Group    MEDICARE MEDICARE A & B      Payor Plan Address Payor Plan Phone Number Payor Plan Fax Number Effective Dates    [address] [phone]  4/1/2013 - None Entered    [facility] [address]       Subscriber Name Subscriber Birth Date Member ID       SANDOR WALKER Y 1950 [id number]           Secondary Coverage     Payor Plan Insurance Group Employer/Plan Group    [place]      Payor Plan Address Payor Plan Phone Number Payor Plan Fax Number Effective Dates    [address] [phone]  9/19/2017 - None Entered    [facility] [address]       Subscriber Name Subscriber Birth Date Member ID       SANDOR WALKER Y 1950 [id number]                 Emergency Contacts      (Rel.) Home Phone Work Phone Mobile Phone    Riley Chase (Son) -- -- [phone]    Ulysses Walker (Son) [phone] -- --    Jass Walker (Son) -- -- [phone]    Curt Walker (Son) -- -- [phone]                         [facility]  PROGRESS NOTE        "

## 2020-06-17 NOTE — DISCHARGE PLACEMENT REQUEST
Ro Walker (70 y.o. Female)     To [facility]  From [facility] at [facility]() [phone]      [address]  [address] [facility] [address]  Phone:  [phone]  Fax:          Patient:     Ro Walker MRN:  [id number]   [address]  [place] [address] :  1950  SSN:    Phone: [phone] Sex:  F      INSURANCE PAYOR PLAN GROUP # SUBSCRIBER ID   Primary:  Secondary:    [place]  [place] [id number]  [id number]      [id number]  [id number]   Admitting Diagnosis: Incarcerated hernia [K46.0]  Order Date:  2020         Inpatient Case Management  Consult       (Order ID: [id number])     Diagnosis:         Priority:  Routine Expected Date:   Expiration Date:        Interval:   Count:    Comments: Please arrange for outpatient IV antibiotics with ertapenem 1 g IV daily until 2020.  Check CBC, CMP, CRP weekly while on IV antibiotics.  Fax orders to [phone], call [phone] with final arrangements.  Arrange for follow-up with me in 1 week post discharge.  Reason for Consult? abx     Specimen Type:   Specimen Source:   Specimen Taken Date:   Specimen Taken Time:                   Authorizing Provider:Jose De Jesus Quinteros MD  Authorizing Provider's NPI: [id number]  Order Entered By: Jose De Jesus Quinteros MD 2020  9:27 AM     Electronically signed by: Jose De Jesus Quinteros MD 2020  9:27 AM            Date of Birth Social Security Number Address Home Phone MRN    1950  [address]  [place] [address] [phone] [id number]    Mandaen Marital Status          Mormonism        Admission Date Admission Type Admitting Provider Attending Provider Department, Room/Bed    20 Urgent Magda Restrepo MD Anciro, Audree, MD [facility] 5G, J248/1    Discharge Date Discharge Disposition Discharge Destination                       Attending Provider:  Magda Restrepo MD    Allergies:  No Known Allergies    Isolation:  None  "  Infection:  None   Code Status:  CPR    Ht:  154.9 cm (61\")   Wt:  48.5 kg (107 lb)    Admission Cmt:  None   Principal Problem:  Incarcerated hernia [K46.0]                 Active Insurance as of 6/13/2020     Primary Coverage     Payor Plan Insurance Group Employer/Plan Group    MEDICARE MEDICARE A & B      Payor Plan Address Payor Plan Phone Number Payor Plan Fax Number Effective Dates    [address] [phone]  4/1/2013 - None Entered    [facility] [address]       Subscriber Name Subscriber Birth Date Member ID       RO WALKER 1950 [id number]           Secondary Coverage     Payor Plan Insurance Group Employer/Plan Group    [place]      Payor Plan Address Payor Plan Phone Number Payor Plan Fax Number Effective Dates    [address] [phone]  9/19/2017 - None Entered    [facility] [address]       Subscriber Name Subscriber Birth Date Member ID       RO WALKER 1950 [id number]                 Emergency Contacts      (Rel.) Home Phone Work Phone Mobile Phone    Riley Chase (Son) -- -- [phone]    AaronUlysses (Son) [phone] -- --    Aaron Jass (Son) -- -- [phone]    Curt Walker (Son) -- -- [phone]               Consult Notes (most recent note)      Jose De Jesus Quinteros MD at 06/15/20 1621      Consult Orders    1. Inpatient Infectious Diseases Consult [[id number]] ordered by Huy Lara MD at 06/15/20 1011                INFECTIOUS DISEASE CONSULT/INITIAL HOSPITAL VISIT    Ro Walker  1950  [id number]    Date of consult: 6/15/2020    Admit date: 6/13/2020    Requesting Provider: Andra Raines MD  Evaluating physician: Jose De Jesus Quinteros MD  Reason for Consultation: necrotic bowel, left inguinal cellulitis, infected hematoma  Chief Complaint: above      Subjective   History of present illness:  Patient is a 70 y.o.  Yr old female with SLE (on Plaquenil), carcinoma in situe cervix, smoker, with increased left groin lump " since 6/10/20 which became increasingly tender.  Initially seen at [facility] ED where CT revealed incarderated small bowel and left inguinal hernia.  Transferred to [facility] and went to surgery by Dr. Huy Lara on 6/14/20 where he found necrotic bowel, and probable infected hematoma.  The patient was placed on zosyn.  She was confused off and on post surgery.  Procalcitonin level 3.67.  No positive cultures.  Had persistent bandemia and fever postop.  I was consulted 6/15/20.    Past Medical History:   Diagnosis Date   • Cervical cancer (CMS/HCC)    • Lupus (CMS/HCC)    • Polyp of vocal cord        Past Surgical History:   Procedure Laterality Date   • CATARACT EXTRACTION  2008   • CERVICAL CONE BIOPSY     • CERVICAL CONE BIOPSY     • COLON RESECTION SMALL BOWEL N/A 6/13/2020    Procedure: COLON RESECTION SMALL BOWEL;  Surgeon: Huy Lara MD;  Location:  [facility];  Service: General;  Laterality: N/A;   • INGUINAL HERNIA REPAIR Left 6/13/2020    Procedure: INGUINAL HERNIA REPAIR LEFT;  Surgeon: Huy Lara MD;  Location:  [facility];  Service: General;  Laterality: Left;   • LAPAROSCOPIC ASSISTED VAGINAL HYSTERECTOMY N/A 2/26/2018    Procedure: LAPAROSCOPIC ASSISTED VAGINAL HYSTERECTOMY, BILATERAL SALPINGO OOPHORECTOMY;  Surgeon: María Dietrich MD;  Location:  [facility];  Service:    • RETINAL DETACHMENT REPAIR  2006   • THROAT SURGERY  2017   • TUBAL ABDOMINAL LIGATION         Pediatric History   Patient Guardian Status   • Not on file     Other Topics Concern   • Not on file   Social History Narrative   • Not on file   Smoker x 55 y, 0.5-1 ppd.  No drug or etoh.    family history includes Cancer in her brother and brother; Stomach cancer in her sister.    No Known Allergies      There is no immunization history on file for this patient.    Medication:    Current Facility-Administered Medications:   •  acetaminophen (TYLENOL) tablet 650 mg, 650 mg, Oral, Q4H PRN **OR** acetaminophen (TYLENOL) 160  MG/5ML solution 650 mg, 650 mg, Oral, Q4H PRN **OR** acetaminophen (TYLENOL) suppository 650 mg, 650 mg, Rectal, Q4H PRN, Huy Lara MD  •  albuterol (PROVENTIL) nebulizer solution 0.083% 2.5 mg/3mL, 2.5 mg, Nebulization, Q6H While Awake - RT, Huy Lara MD, 2.5 mg at 06/15/20 1212  •  diphenhydrAMINE (BENADRYL) injection 25 mg, 25 mg, Intravenous, Q4H PRN, Huy Lara MD  •  [START ON 2020] enoxaparin (LOVENOX) syringe 30 mg, 30 mg, Subcutaneous, Q24H, Ellen Mendez MD  •  HYDROmorphone (DILAUDID) injection 0.25 mg, 0.25 mg, Intravenous, Once, Huy Lara MD  •  HYDROmorphone (DILAUDID) injection 1 mg, 1 mg, Intravenous, Q2H PRN **AND** naloxone (NARCAN) injection 0.1 mg, 0.1 mg, Intravenous, Q5 Min PRN, Hyu Lara MD  •  LORazepam (ATIVAN) injection 0.5 mg, 0.5 mg, Intravenous, Q12H PRN, Huy Lara MD  •  Morphine sulfate (PF) injection 4 mg, 4 mg, Intravenous, Q2H PRN, 4 mg at 20 **AND** naloxone (NARCAN) injection 0.4 mg, 0.4 mg, Intravenous, Q5 Min PRN, Huy Lara MD  •  [] HYDROmorphone (DILAUDID) injection 0.25 mg, 0.25 mg, Intravenous, Q2H PRN **AND** naloxone (NARCAN) injection 0.4 mg, 0.4 mg, Intravenous, Q5 Min PRN, Huy Lara MD  •  ondansetron (ZOFRAN) injection 4 mg, 4 mg, Intravenous, Q6H PRN **FOLLOWED BY** [START ON 2020] ondansetron (ZOFRAN) tablet 4 mg, 4 mg, Oral, Q6H PRN, Huy Lara MD  •  pantoprazole (PROTONIX) injection 40 mg, 40 mg, Intravenous, Q AM, Huy Lara MD, 40 mg at 20 0626  •  Pharmacy Consult, , Does not apply, Continuous PRN, Huy Lara MD  •  phenol (CHLORASEPTIC) 1.4 % liquid 2 spray, 2 spray, Mouth/Throat, Q2H PRN, Huy Lara MD  •  piperacillin-tazobactam (ZOSYN) 3.375 g in iso-osmotic dextrose 50 ml (premix), 3.375 g, Intravenous, Q8H, Huy Lara MD, 3.375 g at 06/15/20 1420  •  promethazine (PHENERGAN)  injection 12.5 mg, 12.5 mg, Intravenous, Q6H PRN **OR** promethazine (PHENERGAN) injection 12.5 mg, 12.5 mg, Intramuscular, Q6H PRN, Huy Lara MD  •  simethicone (MYLICON) chewable tablet 80 mg, 80 mg, Oral, 4x Daily PRN, Huy Lara MD  •  sodium chloride 0.45 % infusion, 75 mL/hr, Intravenous, Continuous, Huy Lara MD, Last Rate: 75 mL/hr at 06/15/20 1421, 75 mL/hr at 06/15/20 1421  •  sodium chloride 0.9 % flush 10 mL, 10 mL, Intravenous, Q12H, Huy Lara MD, 10 mL at 06/14/20 2032  •  sodium chloride 0.9 % flush 10 mL, 10 mL, Intravenous, PRN, Huy Lara MD    Please refer to the medical record for a full medication list    Review of Systems:    Constitutional-- No Fever, chills or sweats.  Appetite good, and no malaise. No fatigue.  HEENT-- No new vision, hearing or throat complaints.  No epistaxis or oral sores.  Denies odynophagia or dysphagia.  No odynophagia or dysphagia. No headache, photophobia or neck stiffness.  CV-- No chest pain, palpitation or syncope  Resp-- No SOB/cough/Hemoptysis  GI- No nausea, vomiting, or diarrhea.  No hematochezia, melena, or hematemesis. Denies jaundice or chronic liver disease.  -- No dysuria, hematuria, or flank pain.  Denies hesitancy, urgency or flank pain.  Lymph- no swollen lymph nodes in neck/axilla or groin.   Heme- No active bruising or bleeding; no Hx of DVT or PE.  MS-- no swelling or pain in the bones or joints of arms/legs.  No new back pain.  Neuro-- No acute focal weakness or numbness in the arms or legs.  No seizures.  Skin--No rashes or lesions    Physical Exam:   Vital Signs   Temp:  [98.7 °F (37.1 °C)-99.8 °F (37.7 °C)] 99 °F (37.2 °C)  Heart Rate:  [] 96  Resp:  [16-18] 16  BP: (120-140)/(52-61) 120/61    Temp  Min: 98.7 °F (37.1 °C)  Max: 99.8 °F (37.7 °C)  BP  Min: 120/61  Max: 140/52  Pulse  Min: 82  Max: 101  Resp  Min: 16  Max: 18  SpO2  Min: 92 %  Max: 99 %    Blood pressure 120/61, pulse 96,  "temperature 99 °F (37.2 °C), temperature source Oral, resp. rate 16, height 154.9 cm (61\"), weight 48.5 kg (107 lb), SpO2 96 %.  GENERAL: Awake and alert, in moderate distress. Appears older than stated age.  HEENT:  Normocephalic, atraumatic.  Oropharynx without thrush. Dentition in good repair. No cervical adenopathy. No neck masses.  Ears externally normal, Nose externally normal.  EYES: PERRL. No conjunctival injection. No icterus. EOM full.  LYMPHATICS: No lymphadenopathy of the neck or axillary or inguinal regions.   HEART: No murmur, gallop, or pericardial friction rub. Reg rate rhythm, No JVD at 45 degrees.  LUNGS: Clear to auscultation and percussion. No respiratory distress, no use of accessory muscles.  ABDOMEN: Soft, min tenderness left quadrant, nondistended. No appreciable HSM.  Bowel sounds normal.  GENITAL: No external lesions, breasts without masses, back straight, no CVAT, rectal external without lesions.   SKIN: Warm and dry without cutaneous eruptions.  No nodules.  Surg wounds ok.  Midline.  PSYCHIATRIC: Mental status lucid. No confusion.  EXT:  No cellulitic change.  NEURO: Oriented to name, CN 2 to 12 intact, DTR 1 + and symmetric, sensory intact to LT upper and lower extremitiy, motor 5/5 upper and lower extremity, cerebellar and gait not tested.      Results Review:   I reviewed the patient's new clinical results.  I reviewed the patient's new imaging results and agree with the interpretation.  I reviewed the patient's other test results and agree with the interpretation    Results from last 7 days   Lab Units 06/15/20  0853 06/14/20  1249 06/14/20  0619   WBC 10*3/mm3 6.22 6.20 5.17   HEMOGLOBIN g/dL 7.7* 8.6* 8.4*   HEMATOCRIT % 24.4* 27.1* 26.6*   PLATELETS 10*3/mm3 99* 96* 88*     Results from last 7 days   Lab Units 06/15/20  0853   SODIUM mmol/L 136   POTASSIUM mmol/L 4.0   CHLORIDE mmol/L 101   CO2 mmol/L 27.0   BUN mg/dL 17   CREATININE mg/dL 0.67   GLUCOSE mg/dL 120*   CALCIUM mg/dL " 7.8*     Results from last 7 days   Lab Units 06/15/20  0853   ALK PHOS U/L 32*   BILIRUBIN mg/dL 0.3   ALT (SGPT) U/L 10   AST (SGOT) U/L 26                 Results from last 7 days   Lab Units 06/15/20  0853   LACTATE mmol/L 1.1     Estimated Creatinine Clearance: 48.7 mL/min (by C-G formula based on SCr of 0.67 mg/dL).    Microbiology:  Microbiology Results Abnormal     Procedure Component Value - Date/Time    COVID PRE-OP / PRE-PROCEDURE SCREENING ORDER (NO ISOLATION) - Swab, Nasopharynx [[id number]] Collected:  06/13/20 2334    Lab Status:  Final result Specimen:  Swab from Nasopharynx Updated:  06/14/20 0045    Narrative:       The following orders were created for panel order COVID PRE-OP / PRE-PROCEDURE SCREENING ORDER (NO ISOLATION) - Swab, Nasopharynx.  Procedure                               Abnormality         Status                     ---------                               -----------         ------                     COVID-19,CEPHEID,[facility] IN-...[[id number]]  Normal              Final result                 Please view results for these tests on the individual orders.    COVID-19,CEPHEID,[facility] IN-HOUSE(OR EMERGENT/ADD-ON),NP SWAB IN TRANSPORT MEDIA 3-4 HR TAT - Swab, Nasopharynx [[id number]]  (Normal) Collected:  06/13/20 2334    Lab Status:  Final result Specimen:  Swab from Nasopharynx Updated:  06/14/20 0045     COVID19 Not Detected          Radiology:  Imaging Results (Last 72 Hours)     ** No results found for the last 72 hours. **          IMPRESSION:     1.  Incarcerated small bowel with necrosis, with increased risk for intraabdominal infection with strep, gnr, anaerobes.  2.  Repair with mesh, 6/14/20, of #1.  3.  Encephalopathy, toxic and metabolic related to above.  4.  Acute kidney injury on admit w/ Cr 1.3, resolved.  5.  Anemia, chronic disease, and acute post op.  6.  Thrombocytopenia, related to above.  7.  Hypocalcemia, 7.8.  8.  Left groin cellulitis on admit.  9.  Potential infected  groin/RP hematoma.  10.  Acute hypoxic respiratory failure.    RECOMMENDATIONS:    1.  Diagnostically, follow exam, cbc, cmp, crp, blood cx x 2, urine eval and cx, procalcitonin level and radiographic studies.  Lactic acid.  2.  Therapeutically, continue zosyn awaiting further culture data.  Duration till 6/28/20.  May change to ertapenem to facilitate outpatient treatment.  3.  Wound care.  4.  O2 support as needed.    I discussed the patients findings and my recommendations with patient, family and nursing staff    Thank you for asking me to see Ro Walker.  Our group would be pleased to follow this patient over the course of their hospitalization and assist with outpatient antimicrobial therapy, as indicated.  Further recommendations depend on the results of the cultures and clinical course.    Jose De Jesus Quinteros MD  6/15/2020    Electronically signed by Jose De Jesus Quinteros MD at 06/15/20 1805     Outpatient IV antibiotics arranged at [facility]

## 2020-06-17 NOTE — PROGRESS NOTES
[facility] Medicine Services  PROGRESS NOTE    Patient Name: Ro Walker  : 1950  MRN: [id number]    Date of Admission: 2020  Primary Care Physician: Tracy Schrader MD    Subjective   Subjective     CC:  Follow-up for SBO, incarcerated inguinal hernia     HPI:  No acute events overnight. Still no BM. Feeling abdominal discomfort, no significant pain. Denies nausea or vomiting.    Review of Systems  Gen- No fevers, chills  CV- No chest pain, palpitations  Resp- No cough, dyspnea    Objective   Objective     Vital Signs:   Temp:  [97.9 °F (36.6 °C)] 97.9 °F (36.6 °C)  Heart Rate:  [76-99] 85  Resp:  [16-20] 18  BP: (143-181)/(59-79) 181/79        Physical Exam:  Constitutional: No acute distress, awake, alert  HENT: NCAT, mucous membranes moist  Respiratory: Clear to auscultation bilaterally, respiratory effort normal   Cardiovascular: RRR, no murmurs  Gastrointestinal: hypoactive bowel sounds, soft, nontender, nondistended  Psychiatric: Appropriate affect, cooperative  Neurologic: Oriented x 3, Cranial Nerves grossly intact to confrontation, speech clear  Skin: No rashes on exposed skin, incision at midabdominal is clean, dry, and intact     Results Reviewed:  Results from last 7 days   Lab Units 20  0209 06/15/20  0853  20  2215   WBC 10*3/mm3 5.69 4.79  --  6.22   < > 9.57   HEMOGLOBIN g/dL 9.4* 9.7* 9.1* 7.7*   < > 13.5   HEMATOCRIT % 29.4* 30.3* 27.6* 24.4*   < > 42.3   PLATELETS 10*3/mm3 95* 94*  --  99*   < > 115*   INR   --   --   --   --   --  1.02   PROCALCITONIN ng/mL 1.16* 2.06*  --  3.67*  --   --     < > = values in this interval not displayed.     Results from last 7 days   Lab Units 2030 06/15/20  0853   SODIUM mmol/L 137 135* 136   POTASSIUM mmol/L 3.7 3.8 4.0   CHLORIDE mmol/L 101 99 101   CO2 mmol/L 27.0 26.0 27.0   BUN mg/dL 9 15 17   CREATININE mg/dL 0.65 0.69 0.67   GLUCOSE mg/dL 158* 89  120*   CALCIUM mg/dL 7.8* 7.9* 7.8*   ALT (SGPT) U/L 12 13 10   AST (SGOT) U/L 25 28 26     Estimated Creatinine Clearance: 48.7 mL/min (by C-G formula based on SCr of 0.65 mg/dL).    Microbiology Results Abnormal     Procedure Component Value - Date/Time    Urine Culture - Urine, Urine, Clean Catch [[id number]]  (Normal) Collected:  06/15/20 1422    Lab Status:  Final result Specimen:  Urine, Clean Catch Updated:  06/16/20 2242     Urine Culture No growth    Blood Culture - Blood, Hand, Right [[id number]] Collected:  06/15/20 1711    Lab Status:  Preliminary result Specimen:  Blood from Hand, Right Updated:  06/16/20 1730     Blood Culture No growth at 24 hours    Blood Culture - Blood, Arm, Left [[id number]] Collected:  06/15/20 1711    Lab Status:  Preliminary result Specimen:  Blood from Arm, Left Updated:  06/16/20 1730     Blood Culture No growth at 24 hours    Respiratory Culture - Sputum, Cough [[id number]] Collected:  06/15/20 1837    Lab Status:  Preliminary result Specimen:  Sputum from Cough Updated:  06/16/20 0940     Respiratory Culture No growth     Gram Stain Many (4+) WBCs per low power field      Moderate (3+) Epithelial cells per low power field      No organisms seen    COVID PRE-OP / PRE-PROCEDURE SCREENING ORDER (NO ISOLATION) - Swab, Nasopharynx [[id number]] Collected:  06/13/20 2334    Lab Status:  Final result Specimen:  Swab from Nasopharynx Updated:  06/14/20 0045    Narrative:       The following orders were created for panel order COVID PRE-OP / PRE-PROCEDURE SCREENING ORDER (NO ISOLATION) - Swab, Nasopharynx.  Procedure                               Abnormality         Status                     ---------                               -----------         ------                     COVID-19,CEPHEID,ANIL IN-...[[id number]]  Normal              Final result                 Please view results for these tests on the individual orders.    COVID-19,CEPHEID,ANIL IN-HOUSE(OR EMERGENT/ADD-ON),NP SWAB IN  TRANSPORT MEDIA 3-4 HR TAT - Swab, Nasopharynx [[id number]]  (Normal) Collected:  06/13/20 2334    Lab Status:  Final result Specimen:  Swab from Nasopharynx Updated:  06/14/20 0045     COVID19 Not Detected          Imaging Results (Last 24 Hours)     Procedure Component Value Units Date/Time    XR Abdomen KUB [[id number]] Collected:  06/17/20 0820     Updated:  06/17/20 0940    Narrative:       EXAMINATION: XR ABDOMEN KUB-      INDICATION: Followup ileus; K40.90-Unilateral inguinal hernia, without  obstruction or gangrene, not specified as recurrent.      COMPARISON: None.     FINDINGS: Supine views of the abdomen demonstrate nonobstructive bowel  gas pattern with gaseous distended proximal colonic segments including  the ascending and transverse colon with intermixed areas of residual  contrast from recent oral administration in the proximal ascending colon  without gross intraperitoneal free air.       Impression:       Nonobstructive bowel gas pattern with gaseous distended  proximal colonic segments and residual oral contrast within the  ascending colon favoring ileus pattern.     D:  06/17/2020  E:  06/17/2020       XR Chest 1 View [[id number]] Collected:  06/15/20 1711     Updated:  06/16/20 2242    Narrative:       EXAMINATION: XR CHEST 1 VW-06/15/2020:     INDICATION: Sepsis; K40.90-Unilateral inguinal hernia, without  obstruction or gangrene, not specified as recurrent.     COMPARISON: 01/07/2018.     FINDINGS: The heart shadow is borderline enlarged. There is increased  diffuse pulmonary interstitial disease that may reflect interstitial  edema or changes of ARDS. There is very mild bibasilar discoid  atelectasis. No other focal lung disease is seen. Biapical pleural  thickening, right greater than left, is smooth-margined and stable.       Impression:       1.  Interval development of diffuse interstitial disease, resembling  early changes of interstitial edema, possibly ARDS. By history, chest CT  scan has  been ordered for later today.  2.  Mild bibasilar discoid atelectasis.      D:  06/15/2020  E:  06/16/2020     This report was finalized on 6/16/2020 10:39 PM by Dr. Jamal Harley MD.       CT Abdomen Pelvis With Contrast [[id number]] Collected:  06/16/20 0817     Updated:  06/16/20 1446    Narrative:       EXAMINATION: CT CHEST W CONTRAST-, CT ABDOMEN AND PELVIS W CONTRAST-      INDICATION: Postop bandemia, procalcitonin elevation, smoker, SLE;  K40.90-Unilateral inguinal hernia, without obstruction or gangrene, not  specified as recurrent.      TECHNIQUE: CT chest, abdomen and pelvis with intravenous contrast.     The radiation dose reduction device was turned on for each scan per the  ALARA (As Low as Reasonably Achievable) protocol.     COMPARISON: Chest x-ray dated 06/15/2020.     FINDINGS: CHEST: Thyroid is homogeneous in attenuation. No bulky  mediastinal adenopathy. Central airways are patent. Esophagus in normal  course and caliber. Patent nonaneurysmal thoracic aorta with patent  great vessel origins. Central pulmonary arteries are grossly patent.  Cardiac size borderline enlarged without pericardial effusion. Extended  lung windows demonstrate opacifications at the lung bases right greater  than left with air bronchograms present and trace volume right greater  than left bilateral pleural effusions. Considerations for atelectasis  versus airspace disease intermixed with healed granulomatous  involvement. Degenerative changes of the thoracic spine without  aggressive osseous lesion. No soft tissue body wall findings of concern.     ABDOMEN AND PELVIS: Liver demonstrates mild diffuse low-attenuation of  hepatic steatosis with several subcentimeter areas of likely cystic  lesions too small to characterize. Gallbladder is folded in  configuration without cholelithiasis clearly evident. No biliary  dilatation. Pancreas and spleen unremarkable. Adrenals without distinct  nodule. Kidneys without hydronephrosis or  hydroureter. No bulky  retroperitoneal adenopathy. Atherosclerotic abdominal aorta measuring up  to 3.7 cm aneurysmal in the infrarenal portion. Portal veins and IVC  patent. GI tract evaluation demonstrates moderate distended gastric  lumen with air-fluid level. Dilated fluid-filled bowel in the left  midabdomen extending to the lower midabdomen there is an apparent  transition point at the noted anastomosis. Limited evaluation for  extraluminal contact given obstructive bowel gas pattern limiting flow  of intraluminal contrast, however, there is postsurgical change of the  intraabdominal wall and extraluminal fluid in the left lower quadrant  anteriorly with mixed gas locules, however no definitive abscess is  clearly evident despite trace volume ascites. Pelvic viscera otherwise  unremarkable. Degenerative changes of the spine without aggressive  osseous lesion. Asymmetric edema within the left proximal thigh, may  represent postsurgical change, however, underlying deep vein thrombosis  could have a similar appearance.       Impression:       1. Dilated fluid-filled small bowel within the left midabdomen extending  to the lower midabdomen where there is anastomosis in the right lower  midabdomen concerning for transition point and obstructive or at least  partially obstructive gas pattern with postsurgical changes including  extraluminal gas and fluid which should be correlated with surgical  history to evaluate for appropriate time point, however no definitive  peripherally enhancing focal fluid collection to suggest abscess,  however, likely premature for development of definitive abscess given  postsurgical changes. Limited evaluation of intraluminal contrast extent  of involvement due to obstructive bowel gas pattern.  2. Opacifications of the lung bases right greater than left of moderate  to severe atelectatic changes of subsegmental involvement with trace  bilateral pleural effusions.  3. Asymmetric edema  within the lower left abdomen and left thigh body  wall tissues may represent postsurgical change given left inguinal soft  tissue gas, however, underlying deep vein thrombosis could have a  similar appearance and may be further evaluated with duplex.     D:  06/16/2020  E:  06/16/2020     This report was finalized on 6/16/2020 2:43 PM by Dr. Derick Escobar.       CT Chest With Contrast [[id number]] Collected:  06/16/20 0817     Updated:  06/16/20 1446    Narrative:       EXAMINATION: CT CHEST W CONTRAST-, CT ABDOMEN AND PELVIS W CONTRAST-      INDICATION: Postop bandemia, procalcitonin elevation, smoker, SLE;  K40.90-Unilateral inguinal hernia, without obstruction or gangrene, not  specified as recurrent.      TECHNIQUE: CT chest, abdomen and pelvis with intravenous contrast.     The radiation dose reduction device was turned on for each scan per the  ALARA (As Low as Reasonably Achievable) protocol.     COMPARISON: Chest x-ray dated 06/15/2020.     FINDINGS: CHEST: Thyroid is homogeneous in attenuation. No bulky  mediastinal adenopathy. Central airways are patent. Esophagus in normal  course and caliber. Patent nonaneurysmal thoracic aorta with patent  great vessel origins. Central pulmonary arteries are grossly patent.  Cardiac size borderline enlarged without pericardial effusion. Extended  lung windows demonstrate opacifications at the lung bases right greater  than left with air bronchograms present and trace volume right greater  than left bilateral pleural effusions. Considerations for atelectasis  versus airspace disease intermixed with healed granulomatous  involvement. Degenerative changes of the thoracic spine without  aggressive osseous lesion. No soft tissue body wall findings of concern.     ABDOMEN AND PELVIS: Liver demonstrates mild diffuse low-attenuation of  hepatic steatosis with several subcentimeter areas of likely cystic  lesions too small to characterize. Gallbladder is folded in  configuration  without cholelithiasis clearly evident. No biliary  dilatation. Pancreas and spleen unremarkable. Adrenals without distinct  nodule. Kidneys without hydronephrosis or hydroureter. No bulky  retroperitoneal adenopathy. Atherosclerotic abdominal aorta measuring up  to 3.7 cm aneurysmal in the infrarenal portion. Portal veins and IVC  patent. GI tract evaluation demonstrates moderate distended gastric  lumen with air-fluid level. Dilated fluid-filled bowel in the left  midabdomen extending to the lower midabdomen there is an apparent  transition point at the noted anastomosis. Limited evaluation for  extraluminal contact given obstructive bowel gas pattern limiting flow  of intraluminal contrast, however, there is postsurgical change of the  intraabdominal wall and extraluminal fluid in the left lower quadrant  anteriorly with mixed gas locules, however no definitive abscess is  clearly evident despite trace volume ascites. Pelvic viscera otherwise  unremarkable. Degenerative changes of the spine without aggressive  osseous lesion. Asymmetric edema within the left proximal thigh, may  represent postsurgical change, however, underlying deep vein thrombosis  could have a similar appearance.       Impression:       1. Dilated fluid-filled small bowel within the left midabdomen extending  to the lower midabdomen where there is anastomosis in the right lower  midabdomen concerning for transition point and obstructive or at least  partially obstructive gas pattern with postsurgical changes including  extraluminal gas and fluid which should be correlated with surgical  history to evaluate for appropriate time point, however no definitive  peripherally enhancing focal fluid collection to suggest abscess,  however, likely premature for development of definitive abscess given  postsurgical changes. Limited evaluation of intraluminal contrast extent  of involvement due to obstructive bowel gas pattern.  2. Opacifications of the  lung bases right greater than left of moderate  to severe atelectatic changes of subsegmental involvement with trace  bilateral pleural effusions.  3. Asymmetric edema within the lower left abdomen and left thigh body  wall tissues may represent postsurgical change given left inguinal soft  tissue gas, however, underlying deep vein thrombosis could have a  similar appearance and may be further evaluated with duplex.     D:  2020  E:  2020     This report was finalized on 2020 2:43 PM by Dr. Derick Escobar.                  I have reviewed the medications:  Scheduled Meds:  albuterol 2.5 mg Nebulization Q6H While Awake - RT   apixaban 10 mg Oral Q12H   Followed by      [START ON 2020] apixaban 5 mg Oral Q12H   ertapenem 1 g Intravenous Q24H   fat emulsion 250 mL Intravenous Q24H (TPN)   HYDROmorphone 0.25 mg Intravenous Once   pantoprazole 40 mg Intravenous Q AM   sodium chloride 10 mL Intravenous Q12H     Continuous Infusions:  Adult Peripheral 2-in-1 TPN  Last Rate: 100 mL/hr at 20 0012   Pharmacy to Dose TPN     sodium chloride 75 mL/hr Last Rate: 75 mL/hr (20 1547)     PRN Meds:.•  acetaminophen **OR** acetaminophen **OR** acetaminophen  •  diphenhydrAMINE  •  HYDROmorphone **AND** naloxone  •  LORazepam  •  magnesium sulfate **OR** magnesium sulfate **OR** magnesium sulfate  •  Morphine **AND** naloxone  •  [] HYDROmorphone **AND** naloxone  •  ondansetron **FOLLOWED BY** [START ON 2020] ondansetron  •  Pharmacy to Dose TPN  •  phenol  •  potassium chloride  •  potassium phosphate infusion greater than 15 mMoles **OR** potassium phosphate infusion greater than 15 mMoles **OR** potassium phosphate **OR** sodium phosphate IVPB **OR** sodium phosphate IVPB **OR** sodium phosphate IVPB  •  promethazine **OR** promethazine  •  simethicone  •  sodium chloride    Assessment/Plan   Assessment & Plan     Active Hospital Problems    Diagnosis  POA   • **Incarcerated hernia  [K46.0]  Unknown   • Lupus (CMS/HCC) [M32.9]  Unknown   • History of cervical cancer [Z85.41]  Not Applicable   • XI (acute kidney injury) (CMS/HCC) [N17.9]  Unknown   • Tobacco abuse [Z72.0]  Yes      Resolved Hospital Problems    Diagnosis Date Resolved POA   • History of stomach cancer [Z85.028] 06/14/2020 Unknown        Brief Hospital Course to date:  Ro Walker is a 70 y.o. female with PMH of SLE on Plaquenil, tobacco abuse, history of cervical cancer who was admittd on 6/13 for incarcerated inguinal hernia s/p open small bowel resection and left preperitoneal inguinal hernia repair with mesh, complicated by ileus vs SBO.     Incarcerated inguinal hernia s/p open small bowel resection and left peritoneal inguinal hernia repair with mesh on 6/14   Post-op ileus vs SBO  -continue invanz until 6/28  -on TPN, continue liquid diet per gen surg recommendations     Acute Left lower extremity DVT  -start eliquis today for treatment     Elevated procal   Increased risk for intraabdominal infection   -ID following- on invanz til 6/28, unclear source of infection     SLE  -holding plaquenil, resume when tolerating po     Acute blood loss anemia   NING  -H&H stable     XI - resolved     DVT Prophylaxis:  eliquis     Disposition: I expect the patient to be discharged pending further improvement of bowel function.    CODE STATUS:   Code Status and Medical Interventions:   Ordered at: 06/13/20 5318     Code Status:    CPR     Medical Interventions (Level of Support Prior to Arrest):    Full         Electronically signed by Magda Restrepo MD, 06/17/20, 11:51.

## 2020-06-17 NOTE — DISCHARGE PLACEMENT REQUEST
Ro Walker (70 y.o. Female)     To [facility] ER  From [facility] at [facility]() [phone]        [address]  [address] [facility] [address]  Phone:  [phone]  Fax:          Patient:     Ro Walker MRN:  [id number]   [address]  [place] [address] :  1950  SSN:    Phone: [phone] Sex:  F      INSURANCE PAYOR PLAN GROUP # SUBSCRIBER ID   Primary:  Secondary:    MEDICARE  [place] [id number]  [id number]      [id number]  [id number]   Admitting Diagnosis: Incarcerated hernia [K46.0]  Order Date:  2020         Inpatient Case Management  Consult       (Order ID: [id number])     Diagnosis:         Priority:  Routine Expected Date:   Expiration Date:        Interval:   Count:    Comments: Please arrange for outpatient IV antibiotics with ertapenem 1 g IV daily until 2020.  Check CBC, CMP, CRP weekly while on IV antibiotics.  Fax orders to [phone], call [phone] with final arrangements.  Arrange for follow-up with me in 1 week post discharge.  Reason for Consult? abx     Specimen Type:   Specimen Source:   Specimen Taken Date:   Specimen Taken Time:                   Authorizing Provider:Jose De Jesus Quinteros MD  Authorizing Provider's NPI: [id number]  Order Entered By: Jose De Jesus Quinteros MD 2020  9:27 AM     Electronically signed by: Jose De Jesus Quinteros MD 2020  9:27 AM            Date of Birth Social Security Number Address Home Phone MRN    1950  [address]  [place] [address] [phone] [id number]    Muslim Marital Status          Jainism        Admission Date Admission Type Admitting Provider Attending Provider Department, Room/Bed    20 Urgent Magda Restrepo MD Anciro, Audree, MD [facility] 5G, I948/1    Discharge Date Discharge Disposition Discharge Destination                       Attending Provider:  Magda Restrepo MD    Allergies:  No Known Allergies     "Isolation:  None   Infection:  None   Code Status:  CPR    Ht:  154.9 cm (61\")   Wt:  48.5 kg (107 lb)    Admission Cmt:  None   Principal Problem:  Incarcerated hernia [K46.0]                 Active Insurance as of 6/13/2020     Primary Coverage     Payor Plan Insurance Group Employer/Plan Group    MEDICARE MEDICARE A & B      Payor Plan Address Payor Plan Phone Number Payor Plan Fax Number Effective Dates    [address] [phone]  4/1/2013 - None Entered    [facility] [address]       Subscriber Name Subscriber Birth Date Member ID       KIMIRO Y 1950 [id number]           Secondary Coverage     Payor Plan Insurance Group Employer/Plan Group    [place]      Payor Plan Address Payor Plan Phone Number Payor Plan Fax Number Effective Dates    [address] [phone]  9/19/2017 - None Entered    [place] [address]       Subscriber Name Subscriber Birth Date Member ID       RO WALKER Y 1950 [id number]                 Emergency Contacts      (Rel.) Home Phone Work Phone Mobile Phone    Riley Chase (Son) -- -- [phone]    Ulysses Walker (Son) [phone] -- --    Jass Walker (Son) -- -- [phone]    Curt Walker (Son) -- -- [phone]            Insurance Information                MEDICARE/MEDICARE A & B Phone: [phone]    Subscriber: Ro Walker Subscriber#: [id number]    Group#:  Precert#:         [place] Phone: [phone]    Subscriber: Ro Walker Subscriber#: [id number]    Group#:  Precert#:           "

## 2020-06-17 NOTE — PROGRESS NOTES
INFECTIOUS DISEASE Progress Note    Ro Walker  1950  [id number]    Consult: 6/16/20  Admit date: 6/13/2020    Requesting Provider: Andra Raines MD  Evaluating physician: Jose De Jesus Quinteros MD  Reason for Consultation: necrotic bowel, left inguinal cellulitis, infected hematoma  Chief Complaint: above      Subjective   History of present illness:  Patient is a 70 y.o.  Yr old female with SLE (on Plaquenil), carcinoma in situe cervix, smoker, with increased left groin lump since 6/10/20 which became increasingly tender.  Initially seen at [facility] ED where CT revealed incarderated small bowel and left inguinal hernia.  Transferred to [facility] and went to surgery by Dr. Huy Lara on 6/14/20 where he found necrotic bowel, and probable infected hematoma.  The patient was placed on zosyn.  She was confused off and on post surgery.  Procalcitonin level 3.67.  No positive cultures.  Had persistent bandemia and fever postop.  I was consulted 6/15/20.    6/16/2020 history reviewed.  On Zosyn for intra-abdominal infection after incarcerated bowel with resection of necrotic bowel on 6/14/2020.  Left inguinal hernia repair with mesh.  Some abdominal pain.  No high fever.  Lactic acid was normal today along with a decrease in procalcitonin to 2.06.  White blood cell count 4.79 with a hemoglobin of 9.7.  Platelets 94,000.  Blood cultures x2 from 6/15-, respiratory culture negative from 6/15.  Urinalysis did have 13-20 WBCs.  Urine culture negative to date.    6/17/2020 history reviewed.  Patient with DVT in left lower extremity with increased Lovenox treatment.  Slowly passing gas.  On Zosyn for intra-abdominal infection after incarcerated bowel with resection on 6/14/2020.  Changing to ertapenem 1 g IV daily to continue until 6/28/2020.  This will facilitate outpatient therapy.    Past Medical History:   Diagnosis Date   • Cervical cancer (CMS/HCC)    • Lupus (CMS/HCC)    • Polyp of vocal cord        Past Surgical  History:   Procedure Laterality Date   • CATARACT EXTRACTION  2008   • CERVICAL CONE BIOPSY     • CERVICAL CONE BIOPSY     • COLON RESECTION SMALL BOWEL N/A 6/13/2020    Procedure: COLON RESECTION SMALL BOWEL;  Surgeon: Huy Lara MD;  Location:  [facility];  Service: General;  Laterality: N/A;   • INGUINAL HERNIA REPAIR Left 6/13/2020    Procedure: INGUINAL HERNIA REPAIR LEFT;  Surgeon: Huy Lara MD;  Location:  [facility];  Service: General;  Laterality: Left;   • LAPAROSCOPIC ASSISTED VAGINAL HYSTERECTOMY N/A 2/26/2018    Procedure: LAPAROSCOPIC ASSISTED VAGINAL HYSTERECTOMY, BILATERAL SALPINGO OOPHORECTOMY;  Surgeon: María Dietrich MD;  Location:  [facility];  Service:    • RETINAL DETACHMENT REPAIR  2006   • THROAT SURGERY  2017   • TUBAL ABDOMINAL LIGATION         Pediatric History   Patient Guardian Status   • Not on file     Other Topics Concern   • Not on file   Social History Narrative   • Not on file   Smoker x 55 y, 0.5-1 ppd.  No drug or etoh.    family history includes Cancer in her brother and brother; Stomach cancer in her sister.    No Known Allergies      There is no immunization history on file for this patient.    Medication:    Current Facility-Administered Medications:   •  acetaminophen (TYLENOL) tablet 650 mg, 650 mg, Oral, Q4H PRN **OR** acetaminophen (TYLENOL) 160 MG/5ML solution 650 mg, 650 mg, Oral, Q4H PRN **OR** acetaminophen (TYLENOL) suppository 650 mg, 650 mg, Rectal, Q4H PRN, Huy Lara MD  •  Adult Peripheral 2-in-1 TPN, , Intravenous, Cyclic TPN - See Admin Instructions, Huy Lara MD, Last Rate: 100 mL/hr at 06/17/20 0012  •  albuterol (PROVENTIL) nebulizer solution 0.083% 2.5 mg/3mL, 2.5 mg, Nebulization, Q6H While Awake - RT, Huy Lara MD, 2.5 mg at 06/17/20 0714  •  apixaban (ELIQUIS) tablet 10 mg, 10 mg, Oral, Q12H **FOLLOWED BY** [START ON 6/24/2020] apixaban (ELIQUIS) tablet 5 mg, 5 mg, Oral, Q12H, Huy Lara MD  •   diphenhydrAMINE (BENADRYL) injection 25 mg, 25 mg, Intravenous, Q4H PRN, Huy Lara MD  •  fat emulsion (INTRALIPID,LIPOSYN) 20 % infusion emulsion 250 mL, 250 mL, Intravenous, Q24H (TPN), Huy Lara MD, Last Rate: 40 mL/hr at 20, 250 mL at 20  •  HYDROmorphone (DILAUDID) injection 0.25 mg, 0.25 mg, Intravenous, Once, Huy Lara MD  •  HYDROmorphone (DILAUDID) injection 1 mg, 1 mg, Intravenous, Q2H PRN **AND** naloxone (NARCAN) injection 0.1 mg, 0.1 mg, Intravenous, Q5 Min PRN, Huy Lara MD  •  LORazepam (ATIVAN) injection 0.5 mg, 0.5 mg, Intravenous, Q12H PRN, Huy Lara MD  •  Magnesium Sulfate 2 gram Bolus, followed by 8 gram infusion (total Mg dose 10 grams)- Mg less than or equal to 1mg/dL, 2 g, Intravenous, PRN **OR** Magnesium Sulfate 2 gram / 50mL Infusion (GIVE X 3 BAGS TO EQUAL 6GM TOTAL DOSE) - Mg 1.1 - 1.5 mg/dl, 2 g, Intravenous, PRN **OR** Magnesium Sulfate 4 gram infusion- Mg 1.6-1.9 mg/dL, 4 g, Intravenous, PRN, Huy Lara MD  •  Morphine sulfate (PF) injection 4 mg, 4 mg, Intravenous, Q2H PRN, 4 mg at 20 **AND** naloxone (NARCAN) injection 0.4 mg, 0.4 mg, Intravenous, Q5 Min PRN, Huy Lara MD  •  [] HYDROmorphone (DILAUDID) injection 0.25 mg, 0.25 mg, Intravenous, Q2H PRN **AND** naloxone (NARCAN) injection 0.4 mg, 0.4 mg, Intravenous, Q5 Min PRN, Huy Lara MD  •  ondansetron (ZOFRAN) injection 4 mg, 4 mg, Intravenous, Q6H PRN **FOLLOWED BY** [START ON 2020] ondansetron (ZOFRAN) tablet 4 mg, 4 mg, Oral, Q6H PRN, Huy Lara MD  •  pantoprazole (PROTONIX) injection 40 mg, 40 mg, Intravenous, Q AM, Huy Lara MD, 40 mg at 20 0541  •  Pharmacy to Dose TPN, , Does not apply, Continuous PRN, Huy Lara MD  •  phenol (CHLORASEPTIC) 1.4 % liquid 2 spray, 2 spray, Mouth/Throat, Q2H PRN, Huy Lara MD  •  piperacillin-tazobactam (ZOSYN)  3.375 g in iso-osmotic dextrose 50 ml (premix), 3.375 g, Intravenous, Q8H, Huy Lara MD, 3.375 g at 06/17/20 0843  •  potassium chloride 10 mEq in 100 mL IVPB, 10 mEq, Intravenous, Q1H PRN, Huy Lara MD  •  potassium phosphate 45 mmol in sodium chloride 0.9 % 500 mL infusion, 45 mmol, Intravenous, PRN **OR** potassium phosphate 30 mmol in sodium chloride 0.9 % 250 mL infusion, 30 mmol, Intravenous, PRN, Last Rate: 31.3 mL/hr at 06/17/20 0541, 30 mmol at 06/17/20 0541 **OR** potassium phosphate 15 mmol in sodium chloride 0.9 % 100 mL infusion, 15 mmol, Intravenous, PRN **OR** sodium phosphates 45 mmol in sodium chloride 0.9 % 250 mL IVPB, 45 mmol, Intravenous, PRN **OR** sodium phosphates 30 mmol in sodium chloride 0.9 % 250 mL IVPB, 30 mmol, Intravenous, PRN **OR** sodium phosphates 15 mmol in sodium chloride 0.9 % 250 mL IVPB, 15 mmol, Intravenous, PRN, Huy Lara MD  •  promethazine (PHENERGAN) injection 12.5 mg, 12.5 mg, Intravenous, Q6H PRN **OR** promethazine (PHENERGAN) injection 12.5 mg, 12.5 mg, Intramuscular, Q6H PRN, Huy Lara MD  •  simethicone (MYLICON) chewable tablet 80 mg, 80 mg, Oral, 4x Daily PRN, Huy Lara MD  •  sodium chloride 0.45 % infusion, 75 mL/hr, Intravenous, Continuous, Huy Lara MD, Last Rate: 75 mL/hr at 06/16/20 1547, 75 mL/hr at 06/16/20 1547  •  sodium chloride 0.9 % flush 10 mL, 10 mL, Intravenous, Q12H, Huy Lara MD, 10 mL at 06/17/20 0844  •  sodium chloride 0.9 % flush 10 mL, 10 mL, Intravenous, PRN, Huy Lara MD    Please refer to the medical record for a full medication list    Review of Systems:    Constitutional-- No Fever, chills or sweats.  Appetite good, and no malaise. No fatigue.  HEENT-- No new vision, hearing or throat complaints.  No epistaxis or oral sores.  Denies odynophagia or dysphagia.  No odynophagia or dysphagia. No headache, photophobia or neck stiffness.  CV-- No chest  "pain, palpitation or syncope  Resp-- No SOB/cough/Hemoptysis  GI- No nausea, vomiting, or diarrhea.  No hematochezia, melena, or hematemesis. Denies jaundice or chronic liver disease.  Minimal abdominal pain.  -- No dysuria, hematuria, or flank pain.  Denies hesitancy, urgency or flank pain.  Lymph- no swollen lymph nodes in neck/axilla or groin.   Heme- No active bruising or bleeding; no Hx of DVT or PE.  MS-- no swelling or pain in the bones or joints of arms/legs.  No new back pain.  Neuro-- No acute focal weakness or numbness in the arms or legs.  No seizures.  Skin--No rashes or lesions, no nodules    Physical Exam:   Vital Signs   Temp:  [97.9 °F (36.6 °C)] 97.9 °F (36.6 °C)  Heart Rate:  [76-99] 85  Resp:  [16-20] 18  BP: (143-181)/(59-79) 181/79    Temp  Min: 97.9 °F (36.6 °C)  Max: 97.9 °F (36.6 °C)  BP  Min: 143/59  Max: 181/79  Pulse  Min: 76  Max: 99  Resp  Min: 16  Max: 20  SpO2  Min: 91 %  Max: 98 %    Blood pressure (!) 181/79, pulse 85, temperature 97.9 °F (36.6 °C), temperature source Oral, resp. rate 18, height 154.9 cm (61\"), weight 48.5 kg (107 lb), SpO2 94 %.  GENERAL: Awake and alert, in mild distress. Appears older than stated age.  HEENT:  Normocephalic, atraumatic.  Oropharynx without thrush. Dentition in good repair. No cervical adenopathy. No neck masses.  Ears externally normal, Nose externally normal.  EYES: No conjunctival injection. No icterus. EOM full.  LYMPHATICS: No lymphadenopathy of the neck or axillary or inguinal regions.   HEART: No murmur, gallop, or pericardial friction rub. Reg rate rhythm, No JVD at 45 degrees.  LUNGS: Scattered crackle left base. No respiratory distress, no use of accessory muscles.  ABDOMEN: Soft, min tenderness left quadrant, nondistended. No appreciable HSM.  Bowel sounds normal.  SKIN: Warm and dry without cutaneous eruptions.  No nodules.  Surg wounds ok.  Midline.  No significant erythema.  Left groin without significant erythema.  PSYCHIATRIC: " Mental status lucid. No confusion.  EXT:  No cellulitic change.  NEURO: Oriented to name, nonfocal.      Results Review:   I reviewed the patient's new clinical results.  I reviewed the patient's new imaging results and agree with the interpretation.  I reviewed the patient's other test results and agree with the interpretation    Results from last 7 days   Lab Units 06/17/20  0349 06/16/20  0630 06/16/20  0209 06/15/20  0853   WBC 10*3/mm3 5.69 4.79  --  6.22   HEMOGLOBIN g/dL 9.4* 9.7* 9.1* 7.7*   HEMATOCRIT % 29.4* 30.3* 27.6* 24.4*   PLATELETS 10*3/mm3 95* 94*  --  99*     Results from last 7 days   Lab Units 06/17/20  0349   SODIUM mmol/L 137   POTASSIUM mmol/L 3.7   CHLORIDE mmol/L 101   CO2 mmol/L 27.0   BUN mg/dL 9   CREATININE mg/dL 0.65   GLUCOSE mg/dL 158*   CALCIUM mg/dL 7.8*     Results from last 7 days   Lab Units 06/17/20  0349   ALK PHOS U/L 38*   BILIRUBIN mg/dL 0.5   ALT (SGPT) U/L 12   AST (SGOT) U/L 25                 Results from last 7 days   Lab Units 06/17/20  0349   LACTATE mmol/L 1.1     Estimated Creatinine Clearance: 48.7 mL/min (by C-G formula based on SCr of 0.65 mg/dL).    Microbiology:  Microbiology Results Abnormal     Procedure Component Value - Date/Time    Urine Culture - Urine, Urine, Clean Catch [[id number]]  (Normal) Collected:  06/15/20 1422    Lab Status:  Final result Specimen:  Urine, Clean Catch Updated:  06/16/20 2242     Urine Culture No growth    Blood Culture - Blood, Hand, Right [[id number]] Collected:  06/15/20 1711    Lab Status:  Preliminary result Specimen:  Blood from Hand, Right Updated:  06/16/20 1730     Blood Culture No growth at 24 hours    Blood Culture - Blood, Arm, Left [[id number]] Collected:  06/15/20 1711    Lab Status:  Preliminary result Specimen:  Blood from Arm, Left Updated:  06/16/20 1730     Blood Culture No growth at 24 hours    Respiratory Culture - Sputum, Cough [[id number]] Collected:  06/15/20 1837    Lab Status:  Preliminary result Specimen:   Sputum from Cough Updated:  06/16/20 0940     Respiratory Culture No growth     Gram Stain Many (4+) WBCs per low power field      Moderate (3+) Epithelial cells per low power field      No organisms seen    COVID PRE-OP / PRE-PROCEDURE SCREENING ORDER (NO ISOLATION) - Swab, Nasopharynx [[id number]] Collected:  06/13/20 2334    Lab Status:  Final result Specimen:  Swab from Nasopharynx Updated:  06/14/20 0045    Narrative:       The following orders were created for panel order COVID PRE-OP / PRE-PROCEDURE SCREENING ORDER (NO ISOLATION) - Swab, Nasopharynx.  Procedure                               Abnormality         Status                     ---------                               -----------         ------                     COVID-19,CEPHEID,[facility] IN-...[[id number]]  Normal              Final result                 Please view results for these tests on the individual orders.    COVID-19,CEPHEID,[facility] IN-HOUSE(OR EMERGENT/ADD-ON),NP SWAB IN TRANSPORT MEDIA 3-4 HR TAT - Swab, Nasopharynx [[id number]]  (Normal) Collected:  06/13/20 2334    Lab Status:  Final result Specimen:  Swab from Nasopharynx Updated:  06/14/20 0045     COVID19 Not Detected          Radiology:  Imaging Results (Last 72 Hours)     Procedure Component Value Units Date/Time    XR Abdomen KUB [[id number]] Collected:  06/17/20 0820     Updated:  06/17/20 0821    Narrative:          EXAMINATION: XR ABDOMEN KUB-      INDICATION: f/u ileus; K40.90-Unilateral inguinal hernia, without  obstruction or gangrene, not specified as recurrent      COMPARISON: NONE     FINDINGS: Supine views of the abdomen demonstrate nonobstructive bowel  gas pattern with gaseous distended proximal colonic segments including  the ascending and transverse colon with intermixed areas of residual  contrast from recent oral administration of the proximal ascending colon  without gross intraperitoneal free air.       Impression:       Nonobstructive bowel gas pattern with gaseous  distended  proximal colonic segments and residual oral contrast within the  ascending colon favoring ileus pattern.          XR Chest 1 View [[id number]] Collected:  06/15/20 1711     Updated:  06/16/20 2242    Narrative:       EXAMINATION: XR CHEST 1 VW-06/15/2020:     INDICATION: Sepsis; K40.90-Unilateral inguinal hernia, without  obstruction or gangrene, not specified as recurrent.     COMPARISON: 01/07/2018.     FINDINGS: The heart shadow is borderline enlarged. There is increased  diffuse pulmonary interstitial disease that may reflect interstitial  edema or changes of ARDS. There is very mild bibasilar discoid  atelectasis. No other focal lung disease is seen. Biapical pleural  thickening, right greater than left, is smooth-margined and stable.       Impression:       1.  Interval development of diffuse interstitial disease, resembling  early changes of interstitial edema, possibly ARDS. By history, chest CT  scan has been ordered for later today.  2.  Mild bibasilar discoid atelectasis.      D:  06/15/2020  E:  06/16/2020     This report was finalized on 6/16/2020 10:39 PM by Dr. Jamal Harley MD.       CT Abdomen Pelvis With Contrast [[id number]] Collected:  06/16/20 0817     Updated:  06/16/20 1446    Narrative:       EXAMINATION: CT CHEST W CONTRAST-, CT ABDOMEN AND PELVIS W CONTRAST-      INDICATION: Postop bandemia, procalcitonin elevation, smoker, SLE;  K40.90-Unilateral inguinal hernia, without obstruction or gangrene, not  specified as recurrent.      TECHNIQUE: CT chest, abdomen and pelvis with intravenous contrast.     The radiation dose reduction device was turned on for each scan per the  ALARA (As Low as Reasonably Achievable) protocol.     COMPARISON: Chest x-ray dated 06/15/2020.     FINDINGS: CHEST: Thyroid is homogeneous in attenuation. No bulky  mediastinal adenopathy. Central airways are patent. Esophagus in normal  course and caliber. Patent nonaneurysmal thoracic aorta with patent  great  vessel origins. Central pulmonary arteries are grossly patent.  Cardiac size borderline enlarged without pericardial effusion. Extended  lung windows demonstrate opacifications at the lung bases right greater  than left with air bronchograms present and trace volume right greater  than left bilateral pleural effusions. Considerations for atelectasis  versus airspace disease intermixed with healed granulomatous  involvement. Degenerative changes of the thoracic spine without  aggressive osseous lesion. No soft tissue body wall findings of concern.     ABDOMEN AND PELVIS: Liver demonstrates mild diffuse low-attenuation of  hepatic steatosis with several subcentimeter areas of likely cystic  lesions too small to characterize. Gallbladder is folded in  configuration without cholelithiasis clearly evident. No biliary  dilatation. Pancreas and spleen unremarkable. Adrenals without distinct  nodule. Kidneys without hydronephrosis or hydroureter. No bulky  retroperitoneal adenopathy. Atherosclerotic abdominal aorta measuring up  to 3.7 cm aneurysmal in the infrarenal portion. Portal veins and IVC  patent. GI tract evaluation demonstrates moderate distended gastric  lumen with air-fluid level. Dilated fluid-filled bowel in the left  midabdomen extending to the lower midabdomen there is an apparent  transition point at the noted anastomosis. Limited evaluation for  extraluminal contact given obstructive bowel gas pattern limiting flow  of intraluminal contrast, however, there is postsurgical change of the  intraabdominal wall and extraluminal fluid in the left lower quadrant  anteriorly with mixed gas locules, however no definitive abscess is  clearly evident despite trace volume ascites. Pelvic viscera otherwise  unremarkable. Degenerative changes of the spine without aggressive  osseous lesion. Asymmetric edema within the left proximal thigh, may  represent postsurgical change, however, underlying deep vein  thrombosis  could have a similar appearance.       Impression:       1. Dilated fluid-filled small bowel within the left midabdomen extending  to the lower midabdomen where there is anastomosis in the right lower  midabdomen concerning for transition point and obstructive or at least  partially obstructive gas pattern with postsurgical changes including  extraluminal gas and fluid which should be correlated with surgical  history to evaluate for appropriate time point, however no definitive  peripherally enhancing focal fluid collection to suggest abscess,  however, likely premature for development of definitive abscess given  postsurgical changes. Limited evaluation of intraluminal contrast extent  of involvement due to obstructive bowel gas pattern.  2. Opacifications of the lung bases right greater than left of moderate  to severe atelectatic changes of subsegmental involvement with trace  bilateral pleural effusions.  3. Asymmetric edema within the lower left abdomen and left thigh body  wall tissues may represent postsurgical change given left inguinal soft  tissue gas, however, underlying deep vein thrombosis could have a  similar appearance and may be further evaluated with duplex.     D:  06/16/2020  E:  06/16/2020     This report was finalized on 6/16/2020 2:43 PM by Dr. Derick Escobar.       CT Chest With Contrast [[id number]] Collected:  06/16/20 0817     Updated:  06/16/20 1446    Narrative:       EXAMINATION: CT CHEST W CONTRAST-, CT ABDOMEN AND PELVIS W CONTRAST-      INDICATION: Postop bandemia, procalcitonin elevation, smoker, SLE;  K40.90-Unilateral inguinal hernia, without obstruction or gangrene, not  specified as recurrent.      TECHNIQUE: CT chest, abdomen and pelvis with intravenous contrast.     The radiation dose reduction device was turned on for each scan per the  ALARA (As Low as Reasonably Achievable) protocol.     COMPARISON: Chest x-ray dated 06/15/2020.     FINDINGS: CHEST: Thyroid is  homogeneous in attenuation. No bulky  mediastinal adenopathy. Central airways are patent. Esophagus in normal  course and caliber. Patent nonaneurysmal thoracic aorta with patent  great vessel origins. Central pulmonary arteries are grossly patent.  Cardiac size borderline enlarged without pericardial effusion. Extended  lung windows demonstrate opacifications at the lung bases right greater  than left with air bronchograms present and trace volume right greater  than left bilateral pleural effusions. Considerations for atelectasis  versus airspace disease intermixed with healed granulomatous  involvement. Degenerative changes of the thoracic spine without  aggressive osseous lesion. No soft tissue body wall findings of concern.     ABDOMEN AND PELVIS: Liver demonstrates mild diffuse low-attenuation of  hepatic steatosis with several subcentimeter areas of likely cystic  lesions too small to characterize. Gallbladder is folded in  configuration without cholelithiasis clearly evident. No biliary  dilatation. Pancreas and spleen unremarkable. Adrenals without distinct  nodule. Kidneys without hydronephrosis or hydroureter. No bulky  retroperitoneal adenopathy. Atherosclerotic abdominal aorta measuring up  to 3.7 cm aneurysmal in the infrarenal portion. Portal veins and IVC  patent. GI tract evaluation demonstrates moderate distended gastric  lumen with air-fluid level. Dilated fluid-filled bowel in the left  midabdomen extending to the lower midabdomen there is an apparent  transition point at the noted anastomosis. Limited evaluation for  extraluminal contact given obstructive bowel gas pattern limiting flow  of intraluminal contrast, however, there is postsurgical change of the  intraabdominal wall and extraluminal fluid in the left lower quadrant  anteriorly with mixed gas locules, however no definitive abscess is  clearly evident despite trace volume ascites. Pelvic viscera otherwise  unremarkable. Degenerative  changes of the spine without aggressive  osseous lesion. Asymmetric edema within the left proximal thigh, may  represent postsurgical change, however, underlying deep vein thrombosis  could have a similar appearance.       Impression:       1. Dilated fluid-filled small bowel within the left midabdomen extending  to the lower midabdomen where there is anastomosis in the right lower  midabdomen concerning for transition point and obstructive or at least  partially obstructive gas pattern with postsurgical changes including  extraluminal gas and fluid which should be correlated with surgical  history to evaluate for appropriate time point, however no definitive  peripherally enhancing focal fluid collection to suggest abscess,  however, likely premature for development of definitive abscess given  postsurgical changes. Limited evaluation of intraluminal contrast extent  of involvement due to obstructive bowel gas pattern.  2. Opacifications of the lung bases right greater than left of moderate  to severe atelectatic changes of subsegmental involvement with trace  bilateral pleural effusions.  3. Asymmetric edema within the lower left abdomen and left thigh body  wall tissues may represent postsurgical change given left inguinal soft  tissue gas, however, underlying deep vein thrombosis could have a  similar appearance and may be further evaluated with duplex.     D:  06/16/2020  E:  06/16/2020     This report was finalized on 6/16/2020 2:43 PM by Dr. Derick Escobar.             IMPRESSION:     1.  Incarcerated small bowel with necrosis, with increased risk for intraabdominal infection with strep, gnr, anaerobes.  Blood cultures negative.  2.  Repair with mesh, 6/14/20, of #1.  3.  Encephalopathy, toxic and metabolic related to above.  Improved.  4.  Acute kidney injury on admit w/ Cr 1.3, resolved.  5.  Anemia, chronic disease, and acute post op.  Status post transfusion.  6.  Thrombocytopenia, related to above.  Worse.   Could be related to medications.  7.  Hypocalcemia, 7.8, worse.  8.  Left groin cellulitis on admit.  Resolved.  9.  Potential infected groin/RP hematoma.  On treatment.  10.  Acute hypoxic respiratory failure.  Improved.  11.  Hyponatremia resolved.  12.  Hypoalbuminemia, 2.8, worse.  Mild malnutrition.  13.  Hypophosphatemia 1.5, improved.  14.  DVT left leg, new.  Common femoral left noted on ultrasound 6/16.  15.  Hepatic steatosis.  CT scan 6/16.    Plan:    1.  Diagnostically, follow exam, cbc, cmp, crp, follow 6/15/2020 blood cx x 2, urine eval and cx, procalcitonin level and radiographic studies.  Lactic acid.  2.  Therapeutically, changing to ertapenem 1 g IV daily till 6/28/20.  Discontinuing Zosyn.  Cultures negative to date.  3.  Wound care.  4.  O2 support as needed.    I discussed the patients findings and my recommendations with patient, family and nursing staff    Our group would be pleased to follow this patient over the course of their hospitalization and assist with outpatient antimicrobial therapy, as indicated.  Further recommendations depend on the results of the cultures and clinical course.    Case management orders: Please arrange for outpatient IV antibiotics with ertapenem 1 g IV daily until 6/28/2020.  Check CBC, CMP, CRP weekly while on IV antibiotics.  Fax orders to [phone], call [phone] with final arrangements.  Arrange for follow-up with me in 1 week post discharge.    Jose De Jesus Quinteros MD  6/17/2020

## 2020-06-17 NOTE — PROGRESS NOTES
Ro Walker has been started on Eliquis for an acute LE DVT.  Education was provided to Ms. Walker and her daughter-in-law both verbally and in writing on 06/17/20.  Discussed effects of this new medication, drug-drug interactions, and signs/symptoms of bleeding and clotting.  Instructed her to go to the ED if she ever falls and hits her head, and reminded her of the importance of notifying all providers of this change, especially in the setting of a planned medical or surgical procedure. Ms. Walker verbalized understanding through teach back, and all pertinent questions were answered.     Judi Tran [facility]  06/17/20  15:09

## 2020-06-17 NOTE — PROGRESS NOTES
Pharmacy Parenteral Nutrition Evaluation  Ro Walker is a  70 y.o. female receiving PPN.     Indication: bowel obstruction  Consulting Physician: Marco    Labs  Results from last 7 days   Lab Units 06/17/20  0349 06/16/20  0630 06/15/20  0853   SODIUM mmol/L 137 135* 136   POTASSIUM mmol/L 3.7 3.8 4.0   CHLORIDE mmol/L 101 99 101   CO2 mmol/L 27.0 26.0 27.0   BUN mg/dL 9 15 17   CREATININE mg/dL 0.65 0.69 0.67   CALCIUM mg/dL 7.8* 7.9* 7.8*   BILIRUBIN mg/dL 0.5 0.7 0.3   ALK PHOS U/L 38* 38* 32*   ALT (SGPT) U/L 12 13 10   AST (SGOT) U/L 25 28 26   GLUCOSE mg/dL 158* 89 120*   Ionized Calcium: 1.18 (6/17)    Results from last 7 days   Lab Units 06/17/20  0349 06/14/20  0619   MAGNESIUM mg/dL 1.9  --    PHOSPHORUS mg/dL 1.5*  --    PREALBUMIN mg/dL  --  10.0*     Results from last 7 days   Lab Units 06/17/20  0349 06/16/20  0630 06/16/20  0209 06/15/20  0853   WBC 10*3/mm3 5.69 4.79  --  6.22   HEMOGLOBIN g/dL 9.4* 9.7* 9.1* 7.7*   HEMATOCRIT % 29.4* 30.3* 27.6* 24.4*   PLATELETS 10*3/mm3 95* 94*  --  99*     Triglycerides   Date Value Ref Range Status   06/16/2020 96 0 - 150 mg/dL Final     Comment:     Falsely depressed results may occur on samples drawn from patients receiving N-Acetylcysteine (NAC) or Metamizole.     estimated creatinine clearance is 48.7 mL/min (by C-G formula based on SCr of 0.65 mg/dL).    Intake & Output (last 3 days)         06/14 0701 - 06/15 0700 06/15 0701 - 06/16 0700 06/16 0701 - 06/17 0700 06/17 0701 - 06/18 0700    P.O.  600 240     I.V. (mL/kg) 718 (14.8) 754 (15.5) 1318 (27.2)     Blood  550      IV Piggyback        TPN   1176     Total Intake(mL/kg) 718 (14.8) 1904 (39.3) 2734 (56.4)     Urine (mL/kg/hr) 500 (0.4) 700 (0.6) 1175 (1) 700 (2.4)    Emesis/NG output        Total Output  700    Net +218 +1204 +1559 -700            Urine Unmeasured Occurrence 6 x  1 x           Dietitian Recommendations  Diet Orders (active) (From admission, onward)       Start      Ordered    20 1800  Adult Peripheral 2-in-1 TPN  Cyclic TPN - See Admin Instructions     Note to Pharmacy:  Ro Walker   1950  [facility] [id number]  [id number]    20 1215    20 1800  fat emulsion (INTRALIPID,LIPOSYN) 20 % infusion emulsion 250 mL  Every 24 Hours Scheduled (TPN)      20 1215    20 0906  NPO Diet NPO Except: Ice Chips  Diet Effective Now     Comments:  And meds po    20 0906    06/15/20 1233  Dietary Nutrition Supplements Other (See Comment); Premier Protein  Daily With Breakfast      06/15/20 1233    06/15/20 0848  DIET MESSAGE Please send new diet order tray, full liquid, thanks  Once     Comments:  Please send new diet order tray, full liquid, thanks    06/15/20 0847    20 0800  Snack: Chewing Gum Three Times Daily x30 Minutes to Increase Saliva Flow and Provide Gas Pain Relief  3 Times Daily     Comments:  Chewing Gum Three Times Daily x30 Minutes to Increase Saliva Flow and Provide Gas Pain Relief    20 0249                  Current PN Regimen Recommendation:  Dextrose 9% / Amino Acid 2.5% at goal rate of 100 mL/hr.  20% Lipid Emulsion 250mL every 24 hours.    Assessment/Plan:  1. Pharmacy to dose peripheral PN for ileus vs. small bowel obstruction in this 70yoF.   2. Macronutrient recommendations per RD. 9%D, 2.5%AA, at a goal rate of 100mL/hr.   3. Pt is not diabetic, BG range  during this admission. Will follow closely.  4. Will increase the phos concentration to 8mmol/L but continue standard electrolyte concentrations, otherwise. Phos was also replaced exogenously this morning.   5. Pharmacy will continue to follow and adjust PPN as appropriate.    Judi Tran RPH  2020  13:05

## 2020-06-17 NOTE — PROGRESS NOTES
Continued Stay Note  [facility]     Patient Name: Ro Walker  MRN: [id number]  Today's Date: 6/17/2020    Admit Date: 6/13/2020    Discharge Plan     Row Name 06/17/20 1511       Plan    Plan  discharge plan     Plan Comments  Consult  from [facility] to arrange outpatient IV antibiotics at discharge. Spoke with Romi at [facility] and they are unable to provide IV antibiotics daily due to pt's insurance. Discussed pt's options for outpatient IV antibiotics and pt would prefer to go to [facility] ER daily through 6/28 for IV antibiotics.  Referral faxed to [facility], including antibiotics and labs( [phone]) and they accepted referral.  Spoke with Irais at [facility]( [phone]) and pt can attend any time of the day for IV antibiotic infusion.  CM  will need to notify [facility] when pt is discharged( [phone]).  Romi at [facility] updated with final arrangements and order faxed  to [facility]([phone]).. Pt  agreeable to discharge plan and aware that the day she sees Dr Garcia in the office( 6/24) , she will receive her IV antibiotics that day in the office.  Pt has family that will transport. Pt agreeable to discharge plan. CM will cont to follow.                                            Final Discharge Disposition Code  01 - home or self-care        Discharge Codes    No documentation.       Expected Discharge Date and Time     Expected Discharge Date Expected Discharge Time    Jun 20, 2020             Sapphire Maria RN

## 2020-06-17 NOTE — PROGRESS NOTES
"Cc: POD#4  Open SB resection, LIHR w mesh  \"I'm cold\"  \"I want to go home\"    Invanz#1, Abx #4    Son is in the room.  She says she thinks she has infection because she has been cold since she has been here.  No sweats.  Apparently she has not received the Eliquis ordered this morning and they are going to give it now.  She is passing flatus but no bowel movement.  She is not really hungry and doesn't want her diet advanced.  Son wants to bring her flavored yoghurt and I said that would be fine.  No longer has a productive cough.  She is at bedrest because of her DVT.  No fever recorded T-max 98.0 currently 97.9 pulse 85 respirations 20 blood pressure 181/79  UO 1175 - 700 she is in no apparent distress nontoxic lungs are clear.  Heart regular rhythm.  Abdomen soft and only mildly distended today.  Bowel sounds present but slightly hypoactive no further high pitched sounds.  Subcutaneous edema with slight erythema, blanches, not warm, NT around lower wound, mons pubis, and along medial thigh.  Previous cellulitis at the inguinal area resolved.  LLE now larger than the left with 1-2+ non-pitting edema, calf soft, NT.      Phosphorus 1.5 magnesium 1.9 ionized calcium 1.18 CMP normal except for glucose of 158 calcium 7.8 total protein 5.4 albumin 2.80 alkaline phosphatase 38 lactate is 1.1 procalcitonin 1.16 white blood count 5.7 with 72 segs 15 lymphs 9 monocytes no bands.  H&H 9.4 and 29.4.  Platelet count 95,000.  Abdominal x-ray films reviewed.  Mild ileus involving the small and large bowel, nonobstructive pattern.  Residual stool and contrast throughout the colon.  Blood cultures no growth so far.  Urine culture no growth.  Respiratory culture appears negative.    Imp/Plan:      Postoperative day #4 open reduction of strangulated inguinal incarcerated left direct hernia with associated SBO, small bowel resection and mesh repair complicated by intraoperative bleeding from left femoral vein oversewn.     - New " redness lower wound, mons pubis and inner left thigh concerning especially with mesh.  On Abx, ID following.  No fever.  Normal white count and bandemia and left shift have resolved.  Lactate remains normal, procalcitonin still elevated 1.16 but decreasing daily. Now on Invanz.   notes that patient is not able to have IV antibiotics as an outpatient because of her insurance and can come to the ER daily for IV abx.     -Left common femoral DVT likely related to suture repair of the femoral vein at surgery.  Previously asymptomatic but now with edema noted on the left.  Patient does not complain of left lower extremity pain or paresthesias.  Anticoagulation with Eliquis ordered early today.  Hopefully OOB/ambulate tomorrow once anticoagulated.    -NING.  Surgical bleeding from left femoral vein, there have not been any signs of postoperative bleeding on anticoagulation.  Appropriate increase H&H after transfusion of 2 units packed red blood cells.    -Significant protein malnutrition which may be contributing to her lower abdominal and LLE edema.  Postoperative resolution of small bowel obstruction improving and not quite ready for regular diet yet.  Continue PPN    -Hypophosphatemia.  Adj PPN.    -ATN - resolved    -Thrombocytopenia - stable    -SBO as above.  Clinically improved with passage of flatus and decreased abdominal distention, improved abdominal films.  No bowel movement yet and still anorexic.  Stage II diet ordered this morning but not quite ready to advance yet.    -Lupus - Plaquenil reorderd.    -Bilateral opacifications of the lung bases right greater than left with moderate to severe atelectatic changes and trace bilateral effusions.  Longstanding smoking history and SLE.    Looks well clinically but prognosis guarded with left common femoral DVT, redness of the lower abdomen and inner left thigh, especially with mesh, long-standing smoker, protein malnutrition.      Addendum:  Discussed  the case with Dr. Cervantes CTS/vascular.  Agrees with Eliquis anticoagulation, sees no indication at this time for thrombectomy or other invasive vascular intervention.

## 2020-06-18 LAB
ALBUMIN SERPL-MCNC: 2.9 G/DL (ref 3.5–5.2)
ALBUMIN/GLOB SERPL: 1.1 G/DL
ALP SERPL-CCNC: 43 U/L (ref 39–117)
ALT SERPL W P-5'-P-CCNC: 14 U/L (ref 1–33)
ANION GAP SERPL CALCULATED.3IONS-SCNC: 9 MMOL/L (ref 5–15)
AST SERPL-CCNC: 25 U/L (ref 1–32)
BASOPHILS # BLD AUTO: 0.01 10*3/MM3 (ref 0–0.2)
BASOPHILS NFR BLD AUTO: 0.2 % (ref 0–1.5)
BILIRUB SERPL-MCNC: 0.4 MG/DL (ref 0.2–1.2)
BUN BLD-MCNC: 9 MG/DL (ref 8–23)
BUN/CREAT SERPL: 18.4 (ref 7–25)
CALCIUM SPEC-SCNC: 8.4 MG/DL (ref 8.6–10.5)
CHLORIDE SERPL-SCNC: 103 MMOL/L (ref 98–107)
CK SERPL-CCNC: 126 U/L (ref 20–180)
CO2 SERPL-SCNC: 28 MMOL/L (ref 22–29)
CREAT BLD-MCNC: 0.49 MG/DL (ref 0.57–1)
D-LACTATE SERPL-SCNC: 1.6 MMOL/L (ref 0.5–2)
D-LACTATE SERPL-SCNC: 2.1 MMOL/L (ref 0.5–2)
DEPRECATED RDW RBC AUTO: 45.3 FL (ref 37–54)
EOSINOPHIL # BLD AUTO: 0.1 10*3/MM3 (ref 0–0.4)
EOSINOPHIL NFR BLD AUTO: 1.7 % (ref 0.3–6.2)
ERYTHROCYTE [DISTWIDTH] IN BLOOD BY AUTOMATED COUNT: 13.5 % (ref 12.3–15.4)
GFR SERPL CREATININE-BSD FRML MDRD: 124 ML/MIN/1.73
GLOBULIN UR ELPH-MCNC: 2.7 GM/DL
GLUCOSE BLD-MCNC: 107 MG/DL (ref 65–99)
GLUCOSE BLDC GLUCOMTR-MCNC: 112 MG/DL (ref 70–130)
GLUCOSE BLDC GLUCOMTR-MCNC: 113 MG/DL (ref 70–130)
GLUCOSE BLDC GLUCOMTR-MCNC: 113 MG/DL (ref 70–130)
GLUCOSE BLDC GLUCOMTR-MCNC: 119 MG/DL (ref 70–130)
HCT VFR BLD AUTO: 30.7 % (ref 34–46.6)
HGB BLD-MCNC: 9.9 G/DL (ref 12–15.9)
IMM GRANULOCYTES # BLD AUTO: 0.05 10*3/MM3 (ref 0–0.05)
IMM GRANULOCYTES NFR BLD AUTO: 0.8 % (ref 0–0.5)
LACTATE HOLD SPECIMEN: NORMAL
LYMPHOCYTES # BLD AUTO: 0.92 10*3/MM3 (ref 0.7–3.1)
LYMPHOCYTES NFR BLD AUTO: 15.4 % (ref 19.6–45.3)
MAGNESIUM SERPL-MCNC: 1.9 MG/DL (ref 1.6–2.4)
MCH RBC QN AUTO: 30 PG (ref 26.6–33)
MCHC RBC AUTO-ENTMCNC: 32.2 G/DL (ref 31.5–35.7)
MCV RBC AUTO: 93 FL (ref 79–97)
MONOCYTES # BLD AUTO: 0.66 10*3/MM3 (ref 0.1–0.9)
MONOCYTES NFR BLD AUTO: 11 % (ref 5–12)
NEUTROPHILS # BLD AUTO: 4.24 10*3/MM3 (ref 1.7–7)
NEUTROPHILS NFR BLD AUTO: 70.9 % (ref 42.7–76)
NRBC BLD AUTO-RTO: 0 /100 WBC (ref 0–0.2)
PHOSPHATE SERPL-MCNC: 3.2 MG/DL (ref 2.5–4.5)
PLAT MORPH BLD: NORMAL
PLATELET # BLD AUTO: 148 10*3/MM3 (ref 140–450)
PMV BLD AUTO: 11.8 FL (ref 6–12)
POTASSIUM BLD-SCNC: 3.8 MMOL/L (ref 3.5–5.2)
PROCALCITONIN SERPL-MCNC: 0.49 NG/ML (ref 0.1–0.25)
PROT SERPL-MCNC: 5.6 G/DL (ref 6–8.5)
RBC # BLD AUTO: 3.3 10*6/MM3 (ref 3.77–5.28)
RBC MORPH BLD: NORMAL
SODIUM BLD-SCNC: 140 MMOL/L (ref 136–145)
WBC MORPH BLD: NORMAL
WBC NRBC COR # BLD: 5.98 10*3/MM3 (ref 3.4–10.8)

## 2020-06-18 PROCEDURE — 85025 COMPLETE CBC W/AUTO DIFF WBC: CPT | Performed by: SURGERY

## 2020-06-18 PROCEDURE — 82962 GLUCOSE BLOOD TEST: CPT

## 2020-06-18 PROCEDURE — 82550 ASSAY OF CK (CPK): CPT | Performed by: INTERNAL MEDICINE

## 2020-06-18 PROCEDURE — 83605 ASSAY OF LACTIC ACID: CPT | Performed by: INTERNAL MEDICINE

## 2020-06-18 PROCEDURE — 02HV33Z INSERTION OF INFUSION DEVICE INTO SUPERIOR VENA CAVA, PERCUTANEOUS APPROACH: ICD-10-PCS | Performed by: INTERNAL MEDICINE

## 2020-06-18 PROCEDURE — C1894 INTRO/SHEATH, NON-LASER: HCPCS

## 2020-06-18 PROCEDURE — 99024 POSTOP FOLLOW-UP VISIT: CPT | Performed by: SURGERY

## 2020-06-18 PROCEDURE — 85007 BL SMEAR W/DIFF WBC COUNT: CPT | Performed by: SURGERY

## 2020-06-18 PROCEDURE — 25010000002 CALCIUM GLUCONATE PER 10 ML: Performed by: SURGERY

## 2020-06-18 PROCEDURE — 94799 UNLISTED PULMONARY SVC/PX: CPT

## 2020-06-18 PROCEDURE — 84145 PROCALCITONIN (PCT): CPT | Performed by: INTERNAL MEDICINE

## 2020-06-18 PROCEDURE — 25010000002 MAGNESIUM SULFATE PER 500 MG OF MAGNESIUM: Performed by: SURGERY

## 2020-06-18 PROCEDURE — C1751 CATH, INF, PER/CENT/MIDLINE: HCPCS

## 2020-06-18 PROCEDURE — 25010000002 ERTAPENEM PER 500 MG

## 2020-06-18 PROCEDURE — 83735 ASSAY OF MAGNESIUM: CPT | Performed by: SURGERY

## 2020-06-18 PROCEDURE — 25010000002 POTASSIUM CHLORIDE PER 2 MEQ OF POTASSIUM: Performed by: SURGERY

## 2020-06-18 PROCEDURE — 84100 ASSAY OF PHOSPHORUS: CPT | Performed by: SURGERY

## 2020-06-18 PROCEDURE — 25010000002 DAPTOMYCIN PER 1 MG: Performed by: INTERNAL MEDICINE

## 2020-06-18 PROCEDURE — 99233 SBSQ HOSP IP/OBS HIGH 50: CPT | Performed by: INTERNAL MEDICINE

## 2020-06-18 PROCEDURE — 80053 COMPREHEN METABOLIC PANEL: CPT | Performed by: INTERNAL MEDICINE

## 2020-06-18 RX ORDER — SODIUM CHLORIDE 9 MG/ML
100 INJECTION, SOLUTION INTRAVENOUS ONCE
Status: DISCONTINUED | OUTPATIENT
Start: 2020-06-18 | End: 2020-06-18

## 2020-06-18 RX ORDER — SODIUM CHLORIDE 0.9 % (FLUSH) 0.9 %
10 SYRINGE (ML) INJECTION EVERY 12 HOURS SCHEDULED
Status: DISCONTINUED | OUTPATIENT
Start: 2020-06-18 | End: 2020-06-19 | Stop reason: HOSPADM

## 2020-06-18 RX ORDER — SODIUM CHLORIDE 0.9 % (FLUSH) 0.9 %
10 SYRINGE (ML) INJECTION AS NEEDED
Status: DISCONTINUED | OUTPATIENT
Start: 2020-06-18 | End: 2020-06-19 | Stop reason: HOSPADM

## 2020-06-18 RX ADMIN — HYDROXYCHLOROQUINE SULFATE 200 MG: 200 TABLET ORAL at 08:52

## 2020-06-18 RX ADMIN — SODIUM CHLORIDE, PRESERVATIVE FREE 10 ML: 5 INJECTION INTRAVENOUS at 22:19

## 2020-06-18 RX ADMIN — MAGNESIUM HYDROXIDE 10 ML: 2400 SUSPENSION ORAL at 22:23

## 2020-06-18 RX ADMIN — ERTAPENEM SODIUM 1 G: 1 INJECTION, POWDER, LYOPHILIZED, FOR SOLUTION INTRAMUSCULAR; INTRAVENOUS at 17:24

## 2020-06-18 RX ADMIN — ALBUTEROL SULFATE 2.5 MG: 2.5 SOLUTION RESPIRATORY (INHALATION) at 19:01

## 2020-06-18 RX ADMIN — DAPTOMYCIN 300 MG: 500 INJECTION, POWDER, LYOPHILIZED, FOR SOLUTION INTRAVENOUS at 15:01

## 2020-06-18 RX ADMIN — APIXABAN 10 MG: 5 TABLET, FILM COATED ORAL at 22:16

## 2020-06-18 RX ADMIN — I.V. FAT EMULSION 250 ML: 20 EMULSION INTRAVENOUS at 17:25

## 2020-06-18 RX ADMIN — ALBUTEROL SULFATE 2.5 MG: 2.5 SOLUTION RESPIRATORY (INHALATION) at 13:19

## 2020-06-18 RX ADMIN — PANTOPRAZOLE SODIUM 40 MG: 40 INJECTION, POWDER, FOR SOLUTION INTRAVENOUS at 05:42

## 2020-06-18 RX ADMIN — APIXABAN 10 MG: 5 TABLET, FILM COATED ORAL at 08:52

## 2020-06-18 RX ADMIN — ALBUTEROL SULFATE 2.5 MG: 2.5 SOLUTION RESPIRATORY (INHALATION) at 07:08

## 2020-06-18 RX ADMIN — CALCIUM GLUCONATE: 94 INJECTION, SOLUTION INTRAVENOUS at 17:24

## 2020-06-18 NOTE — PLAN OF CARE
Pt with no c/o's pain, no distress, rested well. Will cont to monitor.    Problem: Patient Care Overview  Goal: Plan of Care Review  Outcome: Ongoing (interventions implemented as appropriate)  Goal: Individualization and Mutuality  Outcome: Ongoing (interventions implemented as appropriate)  Goal: Discharge Needs Assessment  Outcome: Ongoing (interventions implemented as appropriate)  Goal: Interprofessional Rounds/Family Conf  Outcome: Ongoing (interventions implemented as appropriate)     Problem: Gastrointestinal Bleeding (Adult)  Goal: Signs and Symptoms of Listed Potential Problems Will be Absent, Minimized or Managed (Gastrointestinal Bleeding)  Outcome: Ongoing (interventions implemented as appropriate)     Problem: Anemia (Adult)  Goal: Identify Related Risk Factors and Signs and Symptoms  Outcome: Ongoing (interventions implemented as appropriate)  Goal: Symptom Improvement  Outcome: Ongoing (interventions implemented as appropriate)

## 2020-06-18 NOTE — PROGRESS NOTES
Pharmacy Parenteral Nutrition Evaluation  Ro Walker is a  70 y.o. female receiving PPN.     Indication: bowel obstruction  Consulting Physician: Marco    Labs  Results from last 7 days   Lab Units 06/18/20  0623 06/17/20  0349 06/16/20  0630   SODIUM mmol/L 140 137 135*   POTASSIUM mmol/L 3.8 3.7 3.8   CHLORIDE mmol/L 103 101 99   CO2 mmol/L 28.0 27.0 26.0   BUN mg/dL 9 9 15   CREATININE mg/dL 0.49* 0.65 0.69   CALCIUM mg/dL 8.4* 7.8* 7.9*   BILIRUBIN mg/dL 0.4 0.5 0.7   ALK PHOS U/L 43 38* 38*   ALT (SGPT) U/L 14 12 13   AST (SGOT) U/L 25 25 28   GLUCOSE mg/dL 107* 158* 89   Ionized Calcium: 1.18 (6/17)    Results from last 7 days   Lab Units 06/18/20  0623 06/17/20  0349 06/14/20  0619   MAGNESIUM mg/dL 1.9 1.9  --    PHOSPHORUS mg/dL 3.2 1.5*  --    PREALBUMIN mg/dL  --   --  10.0*     Results from last 7 days   Lab Units 06/18/20  0623 06/17/20  0349 06/16/20  0630   WBC 10*3/mm3 5.98 5.69 4.79   HEMOGLOBIN g/dL 9.9* 9.4* 9.7*   HEMATOCRIT % 30.7* 29.4* 30.3*   PLATELETS 10*3/mm3 148 95* 94*     Triglycerides   Date Value Ref Range Status   06/16/2020 96 0 - 150 mg/dL Final     Comment:     Falsely depressed results may occur on samples drawn from patients receiving N-Acetylcysteine (NAC) or Metamizole.     estimated creatinine clearance is 48.7 mL/min (A) (by C-G formula based on SCr of 0.49 mg/dL (L)).    Intake & Output (last 3 days)         06/15 0701 - 06/16 0700 06/16 0701 - 06/17 0700 06/17 0701 - 06/18 0700 06/18 0701 - 06/19 0700    P.O. 600 240      I.V. (mL/kg) 754 (15.5) 1318 (27.2)      Blood 550       TPN  1176 1511     Total Intake(mL/kg) 1904 (39.3) 2734 (56.4) 1511 (31.2)     Urine (mL/kg/hr) 700 (0.6) 1175 (1) 3900 (3.4)     Total Output 700 1175 3900     Net +1204 +1559 -2389             Urine Unmeasured Occurrence  1 x            Dietitian Recommendations  Diet Order   Procedures    Diet Regular; GI Soft     Current PN Regimen Recommendation:  Dextrose 9% / Amino Acid 2.5% at goal rate  of 100 mL/hr.  20% Lipid Emulsion 250mL every 24 hours.    Assessment/Plan:  1. Pharmacy to dose peripheral PN for ileus vs. small bowel obstruction in this 70yoF.   2. Macronutrient recommendations per RD. 9%D, 2.5%AA, at a goal rate of 100mL/hr. No changes today -- plan to advance her diet.   3. BG range  during this admission. Will continue to trend in case SSI is needed.  4. No changes to current electrolyte concentrations (standard except phos).   5. Pharmacy will continue to follow and adjust PPN as appropriate.    Judi Tran RPH  6/18/2020  12:01

## 2020-06-18 NOTE — PLAN OF CARE
VSS. IV access was lost at start of shift. Ultrasound guided IV was placed per house supervisor. Per the house supervisor, we should look for alternative access due to limitation of peripheral access. Picc consult was placed. PPN still infusing. Diet advanced to gi soft. No complaints of pain, or n/v. Will continue to monitor.

## 2020-06-18 NOTE — CONSULTS
Placed by HELEN Wilson RN - confirmed with 3cg.  RUE double lumen PICC with 37cm total and 0cm exposed,

## 2020-06-18 NOTE — PROGRESS NOTES
INFECTIOUS DISEASE Progress Note    Ro Walker  1950  [id number]    Consult: 6/16/20  Admit date: 6/13/2020    Requesting Provider: Andra Raines MD  Evaluating physician: Jose De Jesus Quinteros MD  Reason for Consultation: necrotic bowel, left inguinal cellulitis, infected hematoma  Chief Complaint: above      Subjective   History of present illness:  Patient is a 70 y.o.  Yr old female with SLE (on Plaquenil), carcinoma in situe cervix, smoker, with increased left groin lump since 6/10/20 which became increasingly tender.  Initially seen at [facility] ED where CT revealed incarderated small bowel and left inguinal hernia.  Transferred to [facility] and went to surgery by Dr. Huy Lara on 6/14/20 where he found necrotic bowel, and probable infected hematoma.  The patient was placed on zosyn.  She was confused off and on post surgery.  Procalcitonin level 3.67.  No positive cultures.  Had persistent bandemia and fever postop.  I was consulted 6/15/20.    6/16/2020 history reviewed.  On Zosyn for intra-abdominal infection after incarcerated bowel with resection of necrotic bowel on 6/14/2020.  Left inguinal hernia repair with mesh.  Some abdominal pain.  No high fever.  Lactic acid was normal today along with a decrease in procalcitonin to 2.06.  White blood cell count 4.79 with a hemoglobin of 9.7.  Platelets 94,000.  Blood cultures x2 from 6/15-, respiratory culture negative from 6/15.  Urinalysis did have 13-20 WBCs.  Urine culture negative to date.    6/17/2020 history reviewed.  Patient with DVT in left lower extremity with increased Lovenox treatment.  Slowly passing gas.  On Zosyn for intra-abdominal infection after incarcerated bowel with resection on 6/14/2020.  Changing to ertapenem 1 g IV daily to continue until 6/28/2020.  This will facilitate outpatient therapy.    6/18/2020 history reviewed.  DVT left lower extremity.  Had swelling in the left leg.  On ertapenem for intra-abdominal infection after  incarcerated bowel resection on 6/14.  No fever.  Adding daptomycin for staph and enterococcal coverage today.  Daptomycin and ertapenem to continue until approximately 6/28 and reassess.    Past Medical History:   Diagnosis Date   • Cervical cancer (CMS/HCC)    • Lupus (CMS/HCC)    • Polyp of vocal cord        Past Surgical History:   Procedure Laterality Date   • CATARACT EXTRACTION  2008   • CERVICAL CONE BIOPSY     • CERVICAL CONE BIOPSY     • COLON RESECTION SMALL BOWEL N/A 6/13/2020    Procedure: COLON RESECTION SMALL BOWEL;  Surgeon: Huy Lara MD;  Location:  [facility];  Service: General;  Laterality: N/A;   • INGUINAL HERNIA REPAIR Left 6/13/2020    Procedure: INGUINAL HERNIA REPAIR LEFT;  Surgeon: Huy Lara MD;  Location:  [facility];  Service: General;  Laterality: Left;   • LAPAROSCOPIC ASSISTED VAGINAL HYSTERECTOMY N/A 2/26/2018    Procedure: LAPAROSCOPIC ASSISTED VAGINAL HYSTERECTOMY, BILATERAL SALPINGO OOPHORECTOMY;  Surgeon: María Dietrich MD;  Location:  [facility];  Service:    • RETINAL DETACHMENT REPAIR  2006   • THROAT SURGERY  2017   • TUBAL ABDOMINAL LIGATION         Pediatric History   Patient Guardian Status   • Not on file     Other Topics Concern   • Not on file   Social History Narrative   • Not on file   Smoker x 55 y, 0.5-1 ppd.  No drug or etoh.    family history includes Cancer in her brother and brother; Stomach cancer in her sister.    No Known Allergies      There is no immunization history on file for this patient.    Medication:    Current Facility-Administered Medications:   •  acetaminophen (TYLENOL) tablet 650 mg, 650 mg, Oral, Q4H PRN **OR** acetaminophen (TYLENOL) 160 MG/5ML solution 650 mg, 650 mg, Oral, Q4H PRN **OR** acetaminophen (TYLENOL) suppository 650 mg, 650 mg, Rectal, Q4H PRN, Huy Lara MD  •  Adult Peripheral 2-in-1 TPN, , Intravenous, Cyclic TPN - See Admin Instructions, Huy Lara MD, Last Rate: 100 mL/hr at 06/17/20  1701  •  albuterol (PROVENTIL) nebulizer solution 0.083% 2.5 mg/3mL, 2.5 mg, Nebulization, Q6H While Awake - RT, Huy Lara MD, 2.5 mg at 20 0708  •  apixaban (ELIQUIS) tablet 10 mg, 10 mg, Oral, Q12H, 10 mg at 20 0852 **FOLLOWED BY** [START ON 2020] apixaban (ELIQUIS) tablet 5 mg, 5 mg, Oral, Q12H, Huy Lara MD  •  diphenhydrAMINE (BENADRYL) injection 25 mg, 25 mg, Intravenous, Q4H PRN, Huy Lara MD  •  ertapenem (INVanz) 1 g/50 mL 0.9% NS IVPB, 1 g, Intravenous, Q24H, Jose De Jesus Quinteros MD, 1 g at 20 1200  •  fat emulsion (INTRALIPID,LIPOSYN) 20 % infusion emulsion 250 mL, 250 mL, Intravenous, Q24H (TPN), Huy Lara MD, Last Rate: 40 mL/hr at 20 1700, 250 mL at 20 1700  •  HYDROmorphone (DILAUDID) injection 0.25 mg, 0.25 mg, Intravenous, Once, Huy Lara MD  •  HYDROmorphone (DILAUDID) injection 1 mg, 1 mg, Intravenous, Q2H PRN **AND** naloxone (NARCAN) injection 0.1 mg, 0.1 mg, Intravenous, Q5 Min PRN, Huy Lara MD  •  hydroxychloroquine (PLAQUENIL) tablet 200 mg, 200 mg, Oral, Q24H, Huy Lara MD, 200 mg at 20 0852  •  LORazepam (ATIVAN) injection 0.5 mg, 0.5 mg, Intravenous, Q12H PRN, Huy Lara MD  •  Magnesium Sulfate 2 gram Bolus, followed by 8 gram infusion (total Mg dose 10 grams)- Mg less than or equal to 1mg/dL, 2 g, Intravenous, PRN **OR** Magnesium Sulfate 2 gram / 50mL Infusion (GIVE X 3 BAGS TO EQUAL 6GM TOTAL DOSE) - Mg 1.1 - 1.5 mg/dl, 2 g, Intravenous, PRN **OR** Magnesium Sulfate 4 gram infusion- Mg 1.6-1.9 mg/dL, 4 g, Intravenous, PRN, Huy Lara MD  •  Morphine sulfate (PF) injection 4 mg, 4 mg, Intravenous, Q2H PRN, 4 mg at 20 **AND** naloxone (NARCAN) injection 0.4 mg, 0.4 mg, Intravenous, Q5 Min PRN, Huy Lara MD  •  [] HYDROmorphone (DILAUDID) injection 0.25 mg, 0.25 mg, Intravenous, Q2H PRN **AND** naloxone (NARCAN) injection  0.4 mg, 0.4 mg, Intravenous, Q5 Min PRN, Huy Lara MD  •  [] ondansetron (ZOFRAN) injection 4 mg, 4 mg, Intravenous, Q6H PRN **FOLLOWED BY** ondansetron (ZOFRAN) tablet 4 mg, 4 mg, Oral, Q6H PRN, Huy Lara MD  •  pantoprazole (PROTONIX) injection 40 mg, 40 mg, Intravenous, Q AM, Huy Lara MD, 40 mg at 20 0542  •  Pharmacy to Dose TPN, , Does not apply, Continuous PRN, Huy Lara MD  •  phenol (CHLORASEPTIC) 1.4 % liquid 2 spray, 2 spray, Mouth/Throat, Q2H PRN, Huy Lara MD  •  potassium chloride 10 mEq in 100 mL IVPB, 10 mEq, Intravenous, Q1H PRN, Huy Lara MD  •  potassium phosphate 45 mmol in sodium chloride 0.9 % 500 mL infusion, 45 mmol, Intravenous, PRN **OR** potassium phosphate 30 mmol in sodium chloride 0.9 % 250 mL infusion, 30 mmol, Intravenous, PRN, Last Rate: 31.3 mL/hr at 20 0541, 30 mmol at 20 0541 **OR** potassium phosphate 15 mmol in sodium chloride 0.9 % 100 mL infusion, 15 mmol, Intravenous, PRN **OR** sodium phosphates 45 mmol in sodium chloride 0.9 % 250 mL IVPB, 45 mmol, Intravenous, PRN **OR** sodium phosphates 30 mmol in sodium chloride 0.9 % 250 mL IVPB, 30 mmol, Intravenous, PRN **OR** sodium phosphates 15 mmol in sodium chloride 0.9 % 250 mL IVPB, 15 mmol, Intravenous, PRN, Huy Lara MD  •  promethazine (PHENERGAN) injection 12.5 mg, 12.5 mg, Intravenous, Q6H PRN **OR** promethazine (PHENERGAN) injection 12.5 mg, 12.5 mg, Intramuscular, Q6H PRN, Huy Lara MD  •  simethicone (MYLICON) chewable tablet 80 mg, 80 mg, Oral, 4x Daily PRN, Huy Lara MD  •  sodium chloride 0.45 % infusion, 75 mL/hr, Intravenous, Continuous, Huy Lara MD, Last Rate: 75 mL/hr at 20 1547, 75 mL/hr at 20 1547  •  sodium chloride 0.9 % flush 10 mL, 10 mL, Intravenous, Q12H, Huy Lara MD, 10 mL at 20 0844  •  sodium chloride 0.9 % flush 10 mL, 10 mL,  "Intravenous, PRN, Huy Lara MD  •  sodium chloride 0.9 % infusion, 100 mL/hr, Intravenous, Once, Ellen Mendez MD    Please refer to the medical record for a full medication list    Review of Systems:    Constitutional-- No Fever, chills or sweats.  Appetite good, and no malaise. No fatigue.  HEENT-- No new vision, hearing or throat complaints.  No epistaxis or oral sores.  Denies odynophagia or dysphagia.  No odynophagia or dysphagia. No headache, photophobia or neck stiffness.  CV-- No chest pain, palpitation or syncope  Resp-- No SOB/cough/Hemoptysis  GI- No nausea, vomiting, or diarrhea.  No hematochezia, melena, or hematemesis. Denies jaundice or chronic liver disease.  Minimal abdominal pain.  -- No dysuria, hematuria, or flank pain.  Denies hesitancy, urgency or flank pain.  Lymph- no swollen lymph nodes in neck/axilla or groin.   Heme- No active bruising or bleeding; no Hx of DVT or PE.  MS-- no swelling or pain in the bones or joints of arms/legs.  No new back pain.  Neuro-- No acute focal weakness or numbness in the arms or legs.  No seizures.  Skin--No rashes or lesions, no nodules, some redness left lower leg.    Physical Exam:   Vital Signs   Temp:  [97.9 °F (36.6 °C)] 97.9 °F (36.6 °C)  Heart Rate:  [83-89] 83  Resp:  [18-20] 18  BP: (155)/(74) 155/74    Temp  Min: 97.9 °F (36.6 °C)  Max: 97.9 °F (36.6 °C)  BP  Min: 155/74  Max: 155/74  Pulse  Min: 83  Max: 89  Resp  Min: 18  Max: 20  SpO2  Min: 92 %  Max: 92 %    Blood pressure 155/74, pulse 83, temperature 97.9 °F (36.6 °C), temperature source Oral, resp. rate 18, height 154.9 cm (61\"), weight 48.5 kg (107 lb), SpO2 92 %.  GENERAL: Awake and alert, in minimal distress. Appears older than stated age.  HEENT:  Normocephalic, atraumatic.  Oropharynx without thrush. Dentition in good repair. No cervical adenopathy. No neck masses.  Ears externally normal, Nose externally normal.  EYES: No conjunctival injection. No icterus. EOM " full.  LYMPHATICS: No lymphadenopathy of the neck or axillary or inguinal regions.   HEART: No murmur, gallop, or pericardial friction rub. Reg rate rhythm, No JVD at 45 degrees.  LUNGS: Scattered crackle left base. No respiratory distress, no use of accessory muscles.  ABDOMEN: Soft, min tenderness left quadrant, nondistended. No appreciable HSM.  Bowel sounds normal.  SKIN: Warm and dry without cutaneous eruptions.  No nodules.  Surg wounds ok.  Midline.  No significant erythema.  Left groin without significant erythema.  Left lower leg with some swelling from DVT.  PSYCHIATRIC: Mental status lucid. No confusion.  EXT:  No cellulitic change.  NEURO: Oriented to name, nonfocal.      Results Review:   I reviewed the patient's new clinical results.  I reviewed the patient's new imaging results and agree with the interpretation.  I reviewed the patient's other test results and agree with the interpretation    Results from last 7 days   Lab Units 06/18/20  0623 06/17/20  0349 06/16/20  0630   WBC 10*3/mm3 5.98 5.69 4.79   HEMOGLOBIN g/dL 9.9* 9.4* 9.7*   HEMATOCRIT % 30.7* 29.4* 30.3*   PLATELETS 10*3/mm3 148 95* 94*     Results from last 7 days   Lab Units 06/18/20  0623   SODIUM mmol/L 140   POTASSIUM mmol/L 3.8   CHLORIDE mmol/L 103   CO2 mmol/L 28.0   BUN mg/dL 9   CREATININE mg/dL 0.49*   GLUCOSE mg/dL 107*   CALCIUM mg/dL 8.4*     Results from last 7 days   Lab Units 06/18/20  0623   ALK PHOS U/L 43   BILIRUBIN mg/dL 0.4   ALT (SGPT) U/L 14   AST (SGOT) U/L 25                 Results from last 7 days   Lab Units 06/18/20  0623   LACTATE mmol/L 2.1*     Estimated Creatinine Clearance: 48.7 mL/min (A) (by C-G formula based on SCr of 0.49 mg/dL (L)).    Microbiology:  Microbiology Results Abnormal     Procedure Component Value - Date/Time    Blood Culture - Blood, Hand, Right [[id number]] Collected:  06/15/20 1711    Lab Status:  Preliminary result Specimen:  Blood from Hand, Right Updated:  06/17/20 2880     Blood  Culture No growth at 2 days    Blood Culture - Blood, Arm, Left [[id number]] Collected:  06/15/20 1711    Lab Status:  Preliminary result Specimen:  Blood from Arm, Left Updated:  06/17/20 1730     Blood Culture No growth at 2 days    Respiratory Culture - Sputum, Cough [[id number]] Collected:  06/15/20 1837    Lab Status:  Final result Specimen:  Sputum from Cough Updated:  06/17/20 1305     Respiratory Culture Rare Normal Respiratory Candice     Gram Stain Many (4+) WBCs per low power field      Moderate (3+) Epithelial cells per low power field      No organisms seen    Urine Culture - Urine, Urine, Clean Catch [[id number]]  (Normal) Collected:  06/15/20 1422    Lab Status:  Final result Specimen:  Urine, Clean Catch Updated:  06/16/20 2242     Urine Culture No growth    COVID PRE-OP / PRE-PROCEDURE SCREENING ORDER (NO ISOLATION) - Swab, Nasopharynx [[id number]] Collected:  06/13/20 2334    Lab Status:  Final result Specimen:  Swab from Nasopharynx Updated:  06/14/20 0045    Narrative:       The following orders were created for panel order COVID PRE-OP / PRE-PROCEDURE SCREENING ORDER (NO ISOLATION) - Swab, Nasopharynx.  Procedure                               Abnormality         Status                     ---------                               -----------         ------                     COVID-19,CEPHEID,[facility] IN-...[[id number]]  Normal              Final result                 Please view results for these tests on the individual orders.    COVID-19,CEPHEID,[facility] IN-HOUSE(OR EMERGENT/ADD-ON),NP SWAB IN TRANSPORT MEDIA 3-4 HR TAT - Swab, Nasopharynx [[id number]]  (Normal) Collected:  06/13/20 2334    Lab Status:  Final result Specimen:  Swab from Nasopharynx Updated:  06/14/20 0045     COVID19 Not Detected          Radiology:  Imaging Results (Last 72 Hours)     Procedure Component Value Units Date/Time    XR Abdomen KUB [[id number]] Collected:  06/17/20 0820     Updated:  06/17/20 1709    Narrative:       EXAMINATION: XR  ABDOMEN KUB-      INDICATION: Followup ileus; K40.90-Unilateral inguinal hernia, without  obstruction or gangrene, not specified as recurrent.      COMPARISON: None.     FINDINGS: Supine views of the abdomen demonstrate nonobstructive bowel  gas pattern with gaseous distended proximal colonic segments including  the ascending and transverse colon with intermixed areas of residual  contrast from recent oral administration in the proximal ascending colon  without gross intraperitoneal free air.       Impression:       Nonobstructive bowel gas pattern with gaseous distended  proximal colonic segments and residual oral contrast within the  ascending colon favoring ileus pattern.     D:  06/17/2020  E:  06/17/2020     This report was finalized on 6/17/2020 5:06 PM by Dr. Derick Escobar.       XR Chest 1 View [[id number]] Collected:  06/15/20 1711     Updated:  06/16/20 2242    Narrative:       EXAMINATION: XR CHEST 1 VW-06/15/2020:     INDICATION: Sepsis; K40.90-Unilateral inguinal hernia, without  obstruction or gangrene, not specified as recurrent.     COMPARISON: 01/07/2018.     FINDINGS: The heart shadow is borderline enlarged. There is increased  diffuse pulmonary interstitial disease that may reflect interstitial  edema or changes of ARDS. There is very mild bibasilar discoid  atelectasis. No other focal lung disease is seen. Biapical pleural  thickening, right greater than left, is smooth-margined and stable.       Impression:       1.  Interval development of diffuse interstitial disease, resembling  early changes of interstitial edema, possibly ARDS. By history, chest CT  scan has been ordered for later today.  2.  Mild bibasilar discoid atelectasis.      D:  06/15/2020  E:  06/16/2020     This report was finalized on 6/16/2020 10:39 PM by Dr. Jamal Harley MD.       CT Abdomen Pelvis With Contrast [[id number]] Collected:  06/16/20 0817     Updated:  06/16/20 1446    Narrative:       EXAMINATION: CT CHEST W CONTRAST-,  CT ABDOMEN AND PELVIS W CONTRAST-      INDICATION: Postop bandemia, procalcitonin elevation, smoker, SLE;  K40.90-Unilateral inguinal hernia, without obstruction or gangrene, not  specified as recurrent.      TECHNIQUE: CT chest, abdomen and pelvis with intravenous contrast.     The radiation dose reduction device was turned on for each scan per the  ALARA (As Low as Reasonably Achievable) protocol.     COMPARISON: Chest x-ray dated 06/15/2020.     FINDINGS: CHEST: Thyroid is homogeneous in attenuation. No bulky  mediastinal adenopathy. Central airways are patent. Esophagus in normal  course and caliber. Patent nonaneurysmal thoracic aorta with patent  great vessel origins. Central pulmonary arteries are grossly patent.  Cardiac size borderline enlarged without pericardial effusion. Extended  lung windows demonstrate opacifications at the lung bases right greater  than left with air bronchograms present and trace volume right greater  than left bilateral pleural effusions. Considerations for atelectasis  versus airspace disease intermixed with healed granulomatous  involvement. Degenerative changes of the thoracic spine without  aggressive osseous lesion. No soft tissue body wall findings of concern.     ABDOMEN AND PELVIS: Liver demonstrates mild diffuse low-attenuation of  hepatic steatosis with several subcentimeter areas of likely cystic  lesions too small to characterize. Gallbladder is folded in  configuration without cholelithiasis clearly evident. No biliary  dilatation. Pancreas and spleen unremarkable. Adrenals without distinct  nodule. Kidneys without hydronephrosis or hydroureter. No bulky  retroperitoneal adenopathy. Atherosclerotic abdominal aorta measuring up  to 3.7 cm aneurysmal in the infrarenal portion. Portal veins and IVC  patent. GI tract evaluation demonstrates moderate distended gastric  lumen with air-fluid level. Dilated fluid-filled bowel in the left  midabdomen extending to the lower  midabdomen there is an apparent  transition point at the noted anastomosis. Limited evaluation for  extraluminal contact given obstructive bowel gas pattern limiting flow  of intraluminal contrast, however, there is postsurgical change of the  intraabdominal wall and extraluminal fluid in the left lower quadrant  anteriorly with mixed gas locules, however no definitive abscess is  clearly evident despite trace volume ascites. Pelvic viscera otherwise  unremarkable. Degenerative changes of the spine without aggressive  osseous lesion. Asymmetric edema within the left proximal thigh, may  represent postsurgical change, however, underlying deep vein thrombosis  could have a similar appearance.       Impression:       1. Dilated fluid-filled small bowel within the left midabdomen extending  to the lower midabdomen where there is anastomosis in the right lower  midabdomen concerning for transition point and obstructive or at least  partially obstructive gas pattern with postsurgical changes including  extraluminal gas and fluid which should be correlated with surgical  history to evaluate for appropriate time point, however no definitive  peripherally enhancing focal fluid collection to suggest abscess,  however, likely premature for development of definitive abscess given  postsurgical changes. Limited evaluation of intraluminal contrast extent  of involvement due to obstructive bowel gas pattern.  2. Opacifications of the lung bases right greater than left of moderate  to severe atelectatic changes of subsegmental involvement with trace  bilateral pleural effusions.  3. Asymmetric edema within the lower left abdomen and left thigh body  wall tissues may represent postsurgical change given left inguinal soft  tissue gas, however, underlying deep vein thrombosis could have a  similar appearance and may be further evaluated with duplex.     D:  06/16/2020  E:  06/16/2020     This report was finalized on 6/16/2020 2:43 PM  by Dr. Derick Escobar.       CT Chest With Contrast [[id number]] Collected:  06/16/20 0817     Updated:  06/16/20 1446    Narrative:       EXAMINATION: CT CHEST W CONTRAST-, CT ABDOMEN AND PELVIS W CONTRAST-      INDICATION: Postop bandemia, procalcitonin elevation, smoker, SLE;  K40.90-Unilateral inguinal hernia, without obstruction or gangrene, not  specified as recurrent.      TECHNIQUE: CT chest, abdomen and pelvis with intravenous contrast.     The radiation dose reduction device was turned on for each scan per the  ALARA (As Low as Reasonably Achievable) protocol.     COMPARISON: Chest x-ray dated 06/15/2020.     FINDINGS: CHEST: Thyroid is homogeneous in attenuation. No bulky  mediastinal adenopathy. Central airways are patent. Esophagus in normal  course and caliber. Patent nonaneurysmal thoracic aorta with patent  great vessel origins. Central pulmonary arteries are grossly patent.  Cardiac size borderline enlarged without pericardial effusion. Extended  lung windows demonstrate opacifications at the lung bases right greater  than left with air bronchograms present and trace volume right greater  than left bilateral pleural effusions. Considerations for atelectasis  versus airspace disease intermixed with healed granulomatous  involvement. Degenerative changes of the thoracic spine without  aggressive osseous lesion. No soft tissue body wall findings of concern.     ABDOMEN AND PELVIS: Liver demonstrates mild diffuse low-attenuation of  hepatic steatosis with several subcentimeter areas of likely cystic  lesions too small to characterize. Gallbladder is folded in  configuration without cholelithiasis clearly evident. No biliary  dilatation. Pancreas and spleen unremarkable. Adrenals without distinct  nodule. Kidneys without hydronephrosis or hydroureter. No bulky  retroperitoneal adenopathy. Atherosclerotic abdominal aorta measuring up  to 3.7 cm aneurysmal in the infrarenal portion. Portal veins and  IVC  patent. GI tract evaluation demonstrates moderate distended gastric  lumen with air-fluid level. Dilated fluid-filled bowel in the left  midabdomen extending to the lower midabdomen there is an apparent  transition point at the noted anastomosis. Limited evaluation for  extraluminal contact given obstructive bowel gas pattern limiting flow  of intraluminal contrast, however, there is postsurgical change of the  intraabdominal wall and extraluminal fluid in the left lower quadrant  anteriorly with mixed gas locules, however no definitive abscess is  clearly evident despite trace volume ascites. Pelvic viscera otherwise  unremarkable. Degenerative changes of the spine without aggressive  osseous lesion. Asymmetric edema within the left proximal thigh, may  represent postsurgical change, however, underlying deep vein thrombosis  could have a similar appearance.       Impression:       1. Dilated fluid-filled small bowel within the left midabdomen extending  to the lower midabdomen where there is anastomosis in the right lower  midabdomen concerning for transition point and obstructive or at least  partially obstructive gas pattern with postsurgical changes including  extraluminal gas and fluid which should be correlated with surgical  history to evaluate for appropriate time point, however no definitive  peripherally enhancing focal fluid collection to suggest abscess,  however, likely premature for development of definitive abscess given  postsurgical changes. Limited evaluation of intraluminal contrast extent  of involvement due to obstructive bowel gas pattern.  2. Opacifications of the lung bases right greater than left of moderate  to severe atelectatic changes of subsegmental involvement with trace  bilateral pleural effusions.  3. Asymmetric edema within the lower left abdomen and left thigh body  wall tissues may represent postsurgical change given left inguinal soft  tissue gas, however, underlying deep  vein thrombosis could have a  similar appearance and may be further evaluated with duplex.     D:  06/16/2020  E:  06/16/2020     This report was finalized on 6/16/2020 2:43 PM by Dr. Derick Escobar.             IMPRESSION:     1.  Incarcerated small bowel with necrosis, with increased risk for intraabdominal infection with strep, gnr, anaerobes.  Blood cultures negative.  Recovering.  2.  Repair with mesh, 6/14/20, of #1.  3.  Encephalopathy, toxic and metabolic related to above.  Resolved.  4.  Acute kidney injury on admit w/ Cr 1.3, resolved.  Resolved.  5.  Anemia, chronic disease, and acute post op.  Status post transfusion.  Improved.  6.  Thrombocytopenia, related to above.  Resolved.  Could be related to medications.  7.  Hypocalcemia, 8.4.  8.  Left groin cellulitis on admit.  Resolved.  9.  Potential infected groin/RP hematoma.  On treatment.  No positive cultures.  10.  Acute hypoxic respiratory failure.  Improved.  11.  Hyponatremia resolved.  12.  Hypoalbuminemia, 2.8, worse.  Mild malnutrition.  13.  Hypophosphatemia 1.5, improved.  14.  DVT left leg, new.  Common femoral left noted on ultrasound 6/16.  15.  Hepatic steatosis.  CT scan 6/16.  16.  Lactic acidosis, new.  Not overtly toxic.  Normal WBC count.  Possibly from intra-abdominal or impending infection wounds.    Plan:    1.  Diagnostically, follow exam, cbc, cmp, crp, follow 6/15/2020 blood cx x 2, urine eval and cx, procalcitonin level and radiographic studies.  Lactic acid.  2.  Therapeutically, changed to ertapenem (on 6/17) 1 g IV daily till 6/28/20.  Discontinued Zosyn.  Cultures negative to date.  Add daptomycin 350 mg IV daily given the lactic acidosis.  3.  Wound care.  4.  O2 support as needed.    I discussed the patients findings and my recommendations with patient, family and nursing staff    Our group would be pleased to follow this patient over the course of their hospitalization and assist with outpatient antimicrobial therapy, as  indicated.  Further recommendations depend on the results of the cultures and clinical course.    Case management orders: Please arrange for outpatient IV antibiotics with ertapenem 1 g IV daily and daptomycin 500 mg IV daily until 6/28/2020.  Check CBC, CMP, CRP, CPK weekly while on IV antibiotics.  Fax orders to [phone], call [phone] with final arrangements.  Arrange for follow-up with me in 1 week post discharge.    Jose De Jesus Quinteros MD  6/18/2020

## 2020-06-18 NOTE — PROGRESS NOTES
"                  Clinical Nutrition     Reason for Visit:   Follow-up protocol, PN/PO    Patient Name: Ro Walker  YOB: 1950  MRN: [id number]  Date of Encounter: 06/18/20 12:19  Admission date: 6/13/2020    -With MD approval, ANT advanced PO diet from full liquids to GI soft.  -RD recommends continuing current PPN regimen at this time. Will wait for PO intake/tolerance to be established.  -Will continue to monitor and make PPN recommendations as medically appropriate.      Nutrition Assessment   Assessment     Admission diagnosis  Nausea/vomiting  Incarcerated hernia    Additional diagnosis/conditions/procedures this admission  (6/14) s/p colon resection, hernia repair  (6/14) NGT to [facility] initiated and discontinued  Small bowel obstruction  XI  Immunosuppressed  ABL anemia  Iron deficiency  Left femoral DVT  (6/16) PPN initiated via peripheral line    Additional PMH/procedures:  Lupus - chronic Plaquenil  Chronic tobacco use  Cervical cancer  Stomach cancer    Throat surgery (2017)  TAHSBO (2018)    Reported/Observed/Food/Nutrition Related History:   Patient and friend present during visit. Patient reports she has taken in very little of the full liquid diet because she doesn't like the way the food/liquid items taste. States she drank a small amount of Premier Protein yesterday. This morning consumed yogurt but had no Premier Protein drink. Tolerated the yogurt. States she has not had a bowel movement yet. Wants solid food. ANT spoke with Dr. Lara regarding patient's request for solid food and MD approved of the diet advancement beginning at lunch today. RD provided patient with hard copy of menu and obtained lunch meal order to communicate to kitchen. Advised patient to not overdo it at lunch time and to listen to her body.    Anthropometrics     Height: 154.9 cm (61\")  Last filed wt: Weight: 48.5 kg (107 lb) (06/13/20 1559)  Weight Method: Bed scale    BMI: BMI (Calculated): 20.2  Normal: " 18.5-24.9kg/m2    Ideal Body Weight (IBW) (kg): 48.15  Admission wt: 107 lb  Method obtained: bed scale weight per charting 6/13    Weight Change   UBW: 112 lbs per pt report on home scale     Labs reviewed   Yes    Medications reviewed   Yes  Pertinent: protonix, antibiotic  GTT: 1/2 NS @ 75 ml/hr    Nutrition Needs   Height used: 61 in/154.9 cm  Weight used: 107 lbs/48.6 kg (actual wt)    Estimated calories needs: ~1400 calories  MSJ = 933 x 1.3 = 1213  25-30 kcal/kg = 5738-5148    Estimated protein needs: ~58 g protein  1.2 g/kg - 58      Current Nutrition Prescription   PO: Bariatric stage 2 Full liquid diet / sugar-free / no straws / no carbonation  Oral Nutrition Supplement: Premier Protein 1x daily    PN: D9%, AA2.5% @ 100 ml/hr. 250 ml 20% intralipid daily.  Route: peripheral IV  Nutrition Provided: 2.4L, 1434 calories (102% est needs), 60 g protein (103% est needs), GIR = 3.1 mg/kg/min    Intake:  PO - insufficient data  PN - 2 day delivery average = 114 ml (46% goal volume)      Nutrition Diagnosis     6/15  Problem Inadequate oral intake   Etiology Altered GI function, diet order, food preferences   Signs/Symptoms Full liquid diet, patient reports she dislikes most of the items included on the full liquid diet and has been eating a minimal amount, consumed yogurt at breakfast this morning   Status: ongoing    Nutrition Intervention    Follow treatment progress, Care plan reviewed, Interview for preferences, Menu provided, Menu adjusted, Encourage intake     -With MD approval, ANT advanced PO diet from full liquids to GI soft.  -RD recommends continuing current PPN regimen at this time. Will await for PO intake/tolerance to be established.  -Will continue to monitor and make PPN recommendations as medically appropriate.    Goal:   General: Nutrition support treatment  PO: Tolerate PO, Increase intake    Monitoring/Evaluation:   Per protocol, I&O, PO intake, Supplement intake, Pertinent labs, PN  delivery/tolerance, Weight, GI status, Symptoms    Will Continue to follow per protocol    Shanae Christy RD  Time Spent: 60 minutes

## 2020-06-18 NOTE — DISCHARGE PLACEMENT REQUEST
Ro Walker (70 y.o. Female)   To [facility]  From [place]anny [phone]  No DC today. Will get a PICC line    [address]  [address] [facility] [address]  Phone:  [phone]  Fax:          Patient:     Ro Walker MRN:  [id number]   [address]  [place] [address] :  1950  SSN:    Phone: [phone] Sex:  F      INSURANCE PAYOR PLAN GROUP # SUBSCRIBER ID   Primary:  Secondary:    MEDICARE  [place] [id number]  [id number]      [id number]  [id number]   Admitting Diagnosis: Incarcerated hernia [K46.0]  Order Date:  2020         Inpatient Case Management  Consult       (Order ID: [id number])     Diagnosis:         Priority:  Routine Expected Date:   Expiration Date:        Interval:   Count:    Comments: Please arrange for outpatient IV antibiotics with ertapenem 1 g IV daily and daptomycin 350 mg IV daily until 2020.  Check CBC, CMP, CRP, CPK weekly while on IV antibiotics.  Fax orders to [phone], call [phone] with final arrangements.  Arrange for follow-up with me in 1 week post discharge.  Reason for Consult? abx     Specimen Type:   Specimen Source:   Specimen Taken Date:   Specimen Taken Time:                   Authorizing Provider:Jose De Jesus Quinteros MD  Authorizing Provider's NPI: [id number]  Order Entered By: Jose De Jesus Quinteros MD 2020  9:22 AM     Electronically signed by: Jose De Jesus Quinteros MD 2020  9:22 AM            Date of Birth Social Security Number Address Home Phone MRN    1950  [address]  [place] [address] [phone] [id number]    P[employer] Marital Status          Rastafari        Admission Date Admission Type Admitting Provider Attending Provider Department, Room/Bed    20 Urgent Ellen Mendez MD Howard, Gabriela Kirk, MD [facility] 5G, S548/1    Discharge Date Discharge Disposition Discharge Destination                       Attending  "Provider:  Ellen Mendez MD    Allergies:  No Known Allergies    Isolation:  None   Infection:  None   Code Status:  CPR    Ht:  154.9 cm (61\")   Wt:  48.5 kg (107 lb)    Admission Cmt:  None   Principal Problem:  Incarcerated hernia [K46.0]                 Active Insurance as of 6/13/2020     Primary Coverage     Payor Plan Insurance Group Employer/Plan Group    MEDICARE MEDICARE A & B      Payor Plan Address Payor Plan Phone Number Payor Plan Fax Number Effective Dates    [address] [phone]  4/1/2013 - None Entered    [facility] [address]       Subscriber Name Subscriber Birth Date Member ID       RO WALKER 1950 [id number]           Secondary Coverage     Payor Plan Insurance Group Employer/Plan Group    [place]      Payor Plan Address Payor Plan Phone Number Payor Plan Fax Number Effective Dates    [address] [phone]  9/19/2017 - None Entered    [place] [address]       Subscriber Name Subscriber Birth Date Member ID       RO WALKER 1950 [id number]                 Emergency Contacts      (Rel.) Home Phone Work Phone Mobile Phone    Riley Chase (Son) -- -- [phone]    Ulysses Walker (Son) [phone] -- --    Jass Walker (Son) -- -- [phone]    WalkerCurt (Son) -- -- [phone]            Insurance Information                MEDICARE/MEDICARE A & B Phone: [phone]    Subscriber: Ro Walker Subscriber#: [id number]    Group#:  Precert#:         [place] Phone: [phone]    Subscriber: Ro Walker Subscriber#: [id number]    Group#:  Precert#:              Physician Progress Notes (most recent note)      Huy Lara MD at 06/17/20 1633        Cc: POD#4  Open SB resection, LIHR w mesh  \"I'm cold\"  \"I want to go home\"    Invanz#1, Abx #4    Son is in the room.  She says she thinks she has infection because she has been cold since she has been here.  No sweats.  Apparently she " has not received the Eliquis ordered this morning and they are going to give it now.  She is passing flatus but no bowel movement.  She is not really hungry and doesn't want her diet advanced.  Son wants to bring her flavored yoghurt and I said that would be fine.  No longer has a productive cough.  She is at bedrest because of her DVT.  No fever recorded T-max 98.0 currently 97.9 pulse 85 respirations 20 blood pressure 181/79  UO 1175 - 700 she is in no apparent distress nontoxic lungs are clear.  Heart regular rhythm.  Abdomen soft and only mildly distended today.  Bowel sounds present but slightly hypoactive no further high pitched sounds.  Subcutaneous edema with slight erythema, blanches, not warm, NT around lower wound, mons pubis, and along medial thigh.  Previous cellulitis at the inguinal area resolved.  LLE now larger than the left with 1-2+ non-pitting edema, calf soft, NT.      Phosphorus 1.5 magnesium 1.9 ionized calcium 1.18 CMP normal except for glucose of 158 calcium 7.8 total protein 5.4 albumin 2.80 alkaline phosphatase 38 lactate is 1.1 procalcitonin 1.16 white blood count 5.7 with 72 segs 15 lymphs 9 monocytes no bands.  H&H 9.4 and 29.4.  Platelet count 95,000.  Abdominal x-ray films reviewed.  Mild ileus involving the small and large bowel, nonobstructive pattern.  Residual stool and contrast throughout the colon.  Blood cultures no growth so far.  Urine culture no growth.  Respiratory culture appears negative.    Imp/Plan:      Postoperative day #4 open reduction of strangulated inguinal incarcerated left direct hernia with associated SBO, small bowel resection and mesh repair complicated by intraoperative bleeding from left femoral vein oversewn.     - New redness lower wound, mons pubis and inner left thigh concerning especially with mesh.  On Abx, ID following.  No fever.  Normal white count and bandemia and left shift have resolved.  Lactate remains normal, procalcitonin still elevated  1.16 but decreasing daily. Now on Invanz.   notes that patient is not able to have IV antibiotics as an outpatient because of her insurance and can come to the ER daily for IV abx.     -Left common femoral DVT likely related to suture repair of the femoral vein at surgery.  Previously asymptomatic but now with edema noted on the left.  Patient does not complain of left lower extremity pain or paresthesias.  Anticoagulation with Eliquis ordered early today.  Hopefully OOB/ambulate tomorrow once anticoagulated.    -NING.  Surgical bleeding from left femoral vein, there have not been any signs of postoperative bleeding on anticoagulation.  Appropriate increase H&H after transfusion of 2 units packed red blood cells.    -Significant protein malnutrition which may be contributing to her lower abdominal and LLE edema.  Postoperative resolution of small bowel obstruction improving and not quite ready for regular diet yet.  Continue PPN    -Hypophosphatemia.  Adj PPN.    -ATN - resolved    -Thrombocytopenia - stable    -SBO as above.  Clinically improved with passage of flatus and decreased abdominal distention, improved abdominal films.  No bowel movement yet and still anorexic.  Stage II diet ordered this morning but not quite ready to advance yet.    -Lupus - Plaquenil reorderd.    -Bilateral opacifications of the lung bases right greater than left with moderate to severe atelectatic changes and trace bilateral effusions.  Longstanding smoking history and SLE.    Looks well clinically but prognosis guarded with left common femoral DVT, redness of the lower abdomen and inner left thigh, especially with mesh, long-standing smoker, protein malnutrition.      Addendum:  Discussed the case with Dr. Cervantes CTS/vascular.  Agrees with Eliquis anticoagulation, sees no indication at this time for thrombectomy or other invasive vascular intervention.      Electronically signed by Huy Lara MD at 06/17/20  1713          Consult Notes (most recent note)      Jose De Jesus Quinteros MD at 06/15/20 1621      Consult Orders    1. Inpatient Infectious Diseases Consult [[id number]] ordered by Huy Lara MD at 06/15/20 1011                INFECTIOUS DISEASE CONSULT/INITIAL HOSPITAL VISIT    Ro Walker  1950  [id number]    Date of consult: 6/15/2020    Admit date: 6/13/2020    Requesting Provider: Andra Raines MD  Evaluating physician: Jose De Jesus Quinteros MD  Reason for Consultation: necrotic bowel, left inguinal cellulitis, infected hematoma  Chief Complaint: above      Subjective   History of present illness:  Patient is a 70 y.o.  Yr old female with SLE (on Plaquenil), carcinoma in situe cervix, smoker, with increased left groin lump since 6/10/20 which became increasingly tender.  Initially seen at [facility] ED where CT revealed incarderated small bowel and left inguinal hernia.  Transferred to [facility] and went to surgery by Dr. Huy Lara on 6/14/20 where he found necrotic bowel, and probable infected hematoma.  The patient was placed on zosyn.  She was confused off and on post surgery.  Procalcitonin level 3.67.  No positive cultures.  Had persistent bandemia and fever postop.  I was consulted 6/15/20.    Past Medical History:   Diagnosis Date   • Cervical cancer (CMS/HCC)    • Lupus (CMS/HCC)    • Polyp of vocal cord        Past Surgical History:   Procedure Laterality Date   • CATARACT EXTRACTION  2008   • CERVICAL CONE BIOPSY     • CERVICAL CONE BIOPSY     • COLON RESECTION SMALL BOWEL N/A 6/13/2020    Procedure: COLON RESECTION SMALL BOWEL;  Surgeon: Huy Lara MD;  Location:  [facility];  Service: General;  Laterality: N/A;   • INGUINAL HERNIA REPAIR Left 6/13/2020    Procedure: INGUINAL HERNIA REPAIR LEFT;  Surgeon: Huy Lara MD;  Location:  [facility];  Service: General;  Laterality: Left;   • LAPAROSCOPIC ASSISTED VAGINAL HYSTERECTOMY N/A 2/26/2018    Procedure: LAPAROSCOPIC ASSISTED  VAGINAL HYSTERECTOMY, BILATERAL SALPINGO OOPHORECTOMY;  Surgeon: María Dietrich MD;  Location: [facility];  Service:    • RETINAL DETACHMENT REPAIR     • THROAT SURGERY  2017   • TUBAL ABDOMINAL LIGATION         Pediatric History   Patient Guardian Status   • Not on file     Other Topics Concern   • Not on file   Social History Narrative   • Not on file   Smoker x 55 y, 0.5-1 ppd.  No drug or etoh.    family history includes Cancer in her brother and brother; Stomach cancer in her sister.    No Known Allergies      There is no immunization history on file for this patient.    Medication:    Current Facility-Administered Medications:   •  acetaminophen (TYLENOL) tablet 650 mg, 650 mg, Oral, Q4H PRN **OR** acetaminophen (TYLENOL) 160 MG/5ML solution 650 mg, 650 mg, Oral, Q4H PRN **OR** acetaminophen (TYLENOL) suppository 650 mg, 650 mg, Rectal, Q4H PRN, Huy Lara MD  •  albuterol (PROVENTIL) nebulizer solution 0.083% 2.5 mg/3mL, 2.5 mg, Nebulization, Q6H While Awake - RT, Huy Lara MD, 2.5 mg at 06/15/20 1212  •  diphenhydrAMINE (BENADRYL) injection 25 mg, 25 mg, Intravenous, Q4H PRN, Huy Lara MD  •  [START ON 2020] enoxaparin (LOVENOX) syringe 30 mg, 30 mg, Subcutaneous, Q24H, Ellen Mendez MD  •  HYDROmorphone (DILAUDID) injection 0.25 mg, 0.25 mg, Intravenous, Once, Huy Lara MD  •  HYDROmorphone (DILAUDID) injection 1 mg, 1 mg, Intravenous, Q2H PRN **AND** naloxone (NARCAN) injection 0.1 mg, 0.1 mg, Intravenous, Q5 Min PRN, Huy Lara MD  •  LORazepam (ATIVAN) injection 0.5 mg, 0.5 mg, Intravenous, Q12H PRN, Huy Lara MD  •  Morphine sulfate (PF) injection 4 mg, 4 mg, Intravenous, Q2H PRN, 4 mg at 20 **AND** naloxone (NARCAN) injection 0.4 mg, 0.4 mg, Intravenous, Q5 Min PRN, Huy Lara MD  •  [] HYDROmorphone (DILAUDID) injection 0.25 mg, 0.25 mg, Intravenous, Q2H PRN **AND** naloxone (NARCAN)  injection 0.4 mg, 0.4 mg, Intravenous, Q5 Min PRN, Huy Lara MD  •  ondansetron (ZOFRAN) injection 4 mg, 4 mg, Intravenous, Q6H PRN **FOLLOWED BY** [START ON 6/18/2020] ondansetron (ZOFRAN) tablet 4 mg, 4 mg, Oral, Q6H PRN, Huy Lara MD  •  pantoprazole (PROTONIX) injection 40 mg, 40 mg, Intravenous, Q AM, Huy Lara MD, 40 mg at 06/14/20 0626  •  Pharmacy Consult, , Does not apply, Continuous PRN, Huy Lara MD  •  phenol (CHLORASEPTIC) 1.4 % liquid 2 spray, 2 spray, Mouth/Throat, Q2H PRN, Huy Lara MD  •  piperacillin-tazobactam (ZOSYN) 3.375 g in iso-osmotic dextrose 50 ml (premix), 3.375 g, Intravenous, Q8H, Huy Lara MD, 3.375 g at 06/15/20 1420  •  promethazine (PHENERGAN) injection 12.5 mg, 12.5 mg, Intravenous, Q6H PRN **OR** promethazine (PHENERGAN) injection 12.5 mg, 12.5 mg, Intramuscular, Q6H PRN, Huy Lara MD  •  simethicone (MYLICON) chewable tablet 80 mg, 80 mg, Oral, 4x Daily PRN, Huy Lara MD  •  sodium chloride 0.45 % infusion, 75 mL/hr, Intravenous, Continuous, Huy Lara MD, Last Rate: 75 mL/hr at 06/15/20 1421, 75 mL/hr at 06/15/20 1421  •  sodium chloride 0.9 % flush 10 mL, 10 mL, Intravenous, Q12H, Huy Lara MD, 10 mL at 06/14/20 2032  •  sodium chloride 0.9 % flush 10 mL, 10 mL, Intravenous, PRN, Huy Lara MD    Please refer to the medical record for a full medication list    Review of Systems:    Constitutional-- No Fever, chills or sweats.  Appetite good, and no malaise. No fatigue.  HEENT-- No new vision, hearing or throat complaints.  No epistaxis or oral sores.  Denies odynophagia or dysphagia.  No odynophagia or dysphagia. No headache, photophobia or neck stiffness.  CV-- No chest pain, palpitation or syncope  Resp-- No SOB/cough/Hemoptysis  GI- No nausea, vomiting, or diarrhea.  No hematochezia, melena, or hematemesis. Denies jaundice or chronic liver disease.  -- No  "dysuria, hematuria, or flank pain.  Denies hesitancy, urgency or flank pain.  Lymph- no swollen lymph nodes in neck/axilla or groin.   Heme- No active bruising or bleeding; no Hx of DVT or PE.  MS-- no swelling or pain in the bones or joints of arms/legs.  No new back pain.  Neuro-- No acute focal weakness or numbness in the arms or legs.  No seizures.  Skin--No rashes or lesions    Physical Exam:   Vital Signs   Temp:  [98.7 °F (37.1 °C)-99.8 °F (37.7 °C)] 99 °F (37.2 °C)  Heart Rate:  [] 96  Resp:  [16-18] 16  BP: (120-140)/(52-61) 120/61    Temp  Min: 98.7 °F (37.1 °C)  Max: 99.8 °F (37.7 °C)  BP  Min: 120/61  Max: 140/52  Pulse  Min: 82  Max: 101  Resp  Min: 16  Max: 18  SpO2  Min: 92 %  Max: 99 %    Blood pressure 120/61, pulse 96, temperature 99 °F (37.2 °C), temperature source Oral, resp. rate 16, height 154.9 cm (61\"), weight 48.5 kg (107 lb), SpO2 96 %.  GENERAL: Awake and alert, in moderate distress. Appears older than stated age.  HEENT:  Normocephalic, atraumatic.  Oropharynx without thrush. Dentition in good repair. No cervical adenopathy. No neck masses.  Ears externally normal, Nose externally normal.  EYES: PERRL. No conjunctival injection. No icterus. EOM full.  LYMPHATICS: No lymphadenopathy of the neck or axillary or inguinal regions.   HEART: No murmur, gallop, or pericardial friction rub. Reg rate rhythm, No JVD at 45 degrees.  LUNGS: Clear to auscultation and percussion. No respiratory distress, no use of accessory muscles.  ABDOMEN: Soft, min tenderness left quadrant, nondistended. No appreciable HSM.  Bowel sounds normal.  GENITAL: No external lesions, breasts without masses, back straight, no CVAT, rectal external without lesions.   SKIN: Warm and dry without cutaneous eruptions.  No nodules.  Surg wounds ok.  Midline.  PSYCHIATRIC: Mental status lucid. No confusion.  EXT:  No cellulitic change.  NEURO: Oriented to name, CN 2 to 12 intact, DTR 1 + and symmetric, sensory intact to LT " upper and lower extremitiy, motor 5/5 upper and lower extremity, cerebellar and gait not tested.      Results Review:   I reviewed the patient's new clinical results.  I reviewed the patient's new imaging results and agree with the interpretation.  I reviewed the patient's other test results and agree with the interpretation    Results from last 7 days   Lab Units 06/15/20  0853 06/14/20  1249 06/14/20  0619   WBC 10*3/mm3 6.22 6.20 5.17   HEMOGLOBIN g/dL 7.7* 8.6* 8.4*   HEMATOCRIT % 24.4* 27.1* 26.6*   PLATELETS 10*3/mm3 99* 96* 88*     Results from last 7 days   Lab Units 06/15/20  0853   SODIUM mmol/L 136   POTASSIUM mmol/L 4.0   CHLORIDE mmol/L 101   CO2 mmol/L 27.0   BUN mg/dL 17   CREATININE mg/dL 0.67   GLUCOSE mg/dL 120*   CALCIUM mg/dL 7.8*     Results from last 7 days   Lab Units 06/15/20  0853   ALK PHOS U/L 32*   BILIRUBIN mg/dL 0.3   ALT (SGPT) U/L 10   AST (SGOT) U/L 26                 Results from last 7 days   Lab Units 06/15/20  0853   LACTATE mmol/L 1.1     Estimated Creatinine Clearance: 48.7 mL/min (by C-G formula based on SCr of 0.67 mg/dL).    Microbiology:  Microbiology Results Abnormal     Procedure Component Value - Date/Time    COVID PRE-OP / PRE-PROCEDURE SCREENING ORDER (NO ISOLATION) - Swab, Nasopharynx [[id number]] Collected:  06/13/20 2904    Lab Status:  Final result Specimen:  Swab from Nasopharynx Updated:  06/14/20 0045    Narrative:       The following orders were created for panel order COVID PRE-OP / PRE-PROCEDURE SCREENING ORDER (NO ISOLATION) - Swab, Nasopharynx.  Procedure                               Abnormality         Status                     ---------                               -----------         ------                     COVID-19,CEPHEID,[facility] IN-...[[id number]]  Normal              Final result                 Please view results for these tests on the individual orders.    COVID-19,CEPHEID,[facility] IN-HOUSE(OR EMERGENT/ADD-ON),NP SWAB IN TRANSPORT MEDIA 3-4 HR TAT -  Swab, Nasopharynx [[id number]]  (Normal) Collected:  06/13/20 4504    Lab Status:  Final result Specimen:  Swab from Nasopharynx Updated:  06/14/20 0045     COVID19 Not Detected          Radiology:  Imaging Results (Last 72 Hours)     ** No results found for the last 72 hours. **          IMPRESSION:     1.  Incarcerated small bowel with necrosis, with increased risk for intraabdominal infection with strep, gnr, anaerobes.  2.  Repair with mesh, 6/14/20, of #1.  3.  Encephalopathy, toxic and metabolic related to above.  4.  Acute kidney injury on admit w/ Cr 1.3, resolved.  5.  Anemia, chronic disease, and acute post op.  6.  Thrombocytopenia, related to above.  7.  Hypocalcemia, 7.8.  8.  Left groin cellulitis on admit.  9.  Potential infected groin/RP hematoma.  10.  Acute hypoxic respiratory failure.    RECOMMENDATIONS:    1.  Diagnostically, follow exam, cbc, cmp, crp, blood cx x 2, urine eval and cx, procalcitonin level and radiographic studies.  Lactic acid.  2.  Therapeutically, continue zosyn awaiting further culture data.  Duration till 6/28/20.  May change to ertapenem to facilitate outpatient treatment.  3.  Wound care.  4.  O2 support as needed.    I discussed the patients findings and my recommendations with patient, family and nursing staff    Thank you for asking me to see Ro Walker.  Our group would be pleased to follow this patient over the course of their hospitalization and assist with outpatient antimicrobial therapy, as indicated.  Further recommendations depend on the results of the cultures and clinical course.    Jose De Jesus Quinteros MD  6/15/2020    Electronically signed by Jose De Jesus Quinteros MD at 06/15/20 6119

## 2020-06-18 NOTE — PROGRESS NOTES
Continued Stay Note  [facility]     Patient Name: Ro Walker  MRN: [id number]  Today's Date: 6/18/2020    Admit Date: 6/13/2020    Discharge Plan     Row Name 06/18/20 1128       Plan    Plan  Home with outpt IV AB at [facility]    Patient/Family in Agreement with Plan  yes    Plan Comments  Not ready for DC today. Plans for a picc. I did fax the orders for IV infusion to [facility] today (same as yesterday). Will need to notify [facility] [phone] of the hospital DC to coordinate the start date and time for the outpt infusion at [facility]. CM will f/u in the am.        Discharge Codes    No documentation.       Expected Discharge Date and Time     Expected Discharge Date Expected Discharge Time    Jun 20, 2020             Fanny Lacy RN

## 2020-06-18 NOTE — PROGRESS NOTES
[facility] Medicine Services  PROGRESS NOTE    Patient Name: Ro Walker  : 1950  MRN: [id number]    Date of Admission: 2020  Primary Care Physician: Tracy Schrader MD    Subjective   Subjective     CC:  Follow-up for SBO, incarcerated inguinal hernia     HPI:  Pt doing okay this am, has had flatus but still no BM. Unable to get PICC line. Finally able to get PIV.     Review of Systems  Gen- No fevers, chills  CV- No chest pain, palpitations  Resp- No cough, dyspnea  GI- still no BM, no Abd pain, no nausea/vomiting    Objective   Objective     Vital Signs:   Temp:  [97.9 °F (36.6 °C)] 97.9 °F (36.6 °C)  Heart Rate:  [83-89] 83  Resp:  [18-20] 18  BP: (155)/(74) 155/74        Physical Exam:  Constitutional: No acute distress, awake, alert  HENT: NCAT, mucous membranes moist  Respiratory: Clear to auscultation bilaterally, respiratory effort normal   Cardiovascular: RRR, no murmurs  Gastrointestinal: hypoactive bowel sounds, soft, nontender, nondistended  Psychiatric: Appropriate affect, cooperative  Neurologic: Oriented x 3, Cranial Nerves grossly intact to confrontation, speech clear  Skin: No rashes on exposed skin, incision at midabdominal is clean, dry, and intact     Results Reviewed:  Results from last 7 days   Lab Units 20  0620  0209 06/15/20  0853  20  2215   WBC 10*3/mm3 5.69 4.79  --  6.22   < > 9.57   HEMOGLOBIN g/dL 9.4* 9.7* 9.1* 7.7*   < > 13.5   HEMATOCRIT % 29.4* 30.3* 27.6* 24.4*   < > 42.3   PLATELETS 10*3/mm3 95* 94*  --  99*   < > 115*   INR   --   --   --   --   --  1.02   PROCALCITONIN ng/mL 1.16* 2.06*  --  3.67*  --   --     < > = values in this interval not displayed.     Results from last 7 days   Lab Units 20  0630 06/15/20  0853   SODIUM mmol/L 137 135* 136   POTASSIUM mmol/L 3.7 3.8 4.0   CHLORIDE mmol/L 101 99 101   CO2 mmol/L 27.0 26.0 27.0   BUN mg/dL 9 15 17   CREATININE mg/dL 0.65  0.69 0.67   GLUCOSE mg/dL 158* 89 120*   CALCIUM mg/dL 7.8* 7.9* 7.8*   ALT (SGPT) U/L 12 13 10   AST (SGOT) U/L 25 28 26     Estimated Creatinine Clearance: 48.7 mL/min (by C-G formula based on SCr of 0.65 mg/dL).    Microbiology Results Abnormal     Procedure Component Value - Date/Time    Blood Culture - Blood, Hand, Right [[id number]] Collected:  06/15/20 1711    Lab Status:  Preliminary result Specimen:  Blood from Hand, Right Updated:  06/17/20 1730     Blood Culture No growth at 2 days    Blood Culture - Blood, Arm, Left [[id number]] Collected:  06/15/20 1711    Lab Status:  Preliminary result Specimen:  Blood from Arm, Left Updated:  06/17/20 1730     Blood Culture No growth at 2 days    Respiratory Culture - Sputum, Cough [[id number]] Collected:  06/15/20 1837    Lab Status:  Final result Specimen:  Sputum from Cough Updated:  06/17/20 1305     Respiratory Culture Rare Normal Respiratory Candice     Gram Stain Many (4+) WBCs per low power field      Moderate (3+) Epithelial cells per low power field      No organisms seen    Urine Culture - Urine, Urine, Clean Catch [[id number]]  (Normal) Collected:  06/15/20 1422    Lab Status:  Final result Specimen:  Urine, Clean Catch Updated:  06/16/20 2242     Urine Culture No growth    COVID PRE-OP / PRE-PROCEDURE SCREENING ORDER (NO ISOLATION) - Swab, Nasopharynx [[id number]] Collected:  06/13/20 2334    Lab Status:  Final result Specimen:  Swab from Nasopharynx Updated:  06/14/20 0045    Narrative:       The following orders were created for panel order COVID PRE-OP / PRE-PROCEDURE SCREENING ORDER (NO ISOLATION) - Swab, Nasopharynx.  Procedure                               Abnormality         Status                     ---------                               -----------         ------                     COVID-19,CEPHEID,ANIL IN-...[[id number]]  Normal              Final result                 Please view results for these tests on the individual orders.     COVID-19,CEPHEID,ANIL IN-HOUSE(OR EMERGENT/ADD-ON),NP SWAB IN TRANSPORT MEDIA 3-4 HR TAT - Swab, Nasopharynx [[id number]]  (Normal) Collected:  06/13/20 2330    Lab Status:  Final result Specimen:  Swab from Nasopharynx Updated:  06/14/20 0045     COVID19 Not Detected          Imaging Results (Last 24 Hours)     Procedure Component Value Units Date/Time    XR Abdomen KUB [[id number]] Collected:  06/17/20 0820     Updated:  06/17/20 1709    Narrative:       EXAMINATION: XR ABDOMEN KUB-      INDICATION: Followup ileus; K40.90-Unilateral inguinal hernia, without  obstruction or gangrene, not specified as recurrent.      COMPARISON: None.     FINDINGS: Supine views of the abdomen demonstrate nonobstructive bowel  gas pattern with gaseous distended proximal colonic segments including  the ascending and transverse colon with intermixed areas of residual  contrast from recent oral administration in the proximal ascending colon  without gross intraperitoneal free air.       Impression:       Nonobstructive bowel gas pattern with gaseous distended  proximal colonic segments and residual oral contrast within the  ascending colon favoring ileus pattern.     D:  06/17/2020  E:  06/17/2020     This report was finalized on 6/17/2020 5:06 PM by Dr. Derick Escobar.                  I have reviewed the medications:  Scheduled Meds:    albuterol 2.5 mg Nebulization Q6H While Awake - RT   apixaban 10 mg Oral Q12H   Followed by      [START ON 6/24/2020] apixaban 5 mg Oral Q12H   ertapenem 1 g Intravenous Q24H   fat emulsion 250 mL Intravenous Q24H (TPN)   HYDROmorphone 0.25 mg Intravenous Once   hydroxychloroquine 200 mg Oral Q24H   pantoprazole 40 mg Intravenous Q AM   sodium chloride 10 mL Intravenous Q12H     Continuous Infusions:    Adult Peripheral 2-in-1 TPN  Last Rate: 100 mL/hr at 06/17/20 1701   Pharmacy to Dose TPN     sodium chloride 75 mL/hr Last Rate: 75 mL/hr (06/16/20 1547)     PRN Meds:.•  acetaminophen **OR** acetaminophen  **OR** acetaminophen  •  diphenhydrAMINE  •  HYDROmorphone **AND** naloxone  •  LORazepam  •  magnesium sulfate **OR** magnesium sulfate **OR** magnesium sulfate  •  Morphine **AND** naloxone  •  [] HYDROmorphone **AND** naloxone  •  [] ondansetron **FOLLOWED BY** ondansetron  •  Pharmacy to Dose TPN  •  phenol  •  potassium chloride  •  potassium phosphate infusion greater than 15 mMoles **OR** potassium phosphate infusion greater than 15 mMoles **OR** potassium phosphate **OR** sodium phosphate IVPB **OR** sodium phosphate IVPB **OR** sodium phosphate IVPB  •  promethazine **OR** promethazine  •  simethicone  •  sodium chloride    Assessment/Plan   Assessment & Plan     Active Hospital Problems    Diagnosis  POA   • **Incarcerated hernia [K46.0]  Unknown   • Lupus (CMS/HCC) [M32.9]  Unknown   • History of cervical cancer [Z85.41]  Not Applicable   • XI (acute kidney injury) (CMS/HCC) [N17.9]  Unknown   • Tobacco abuse [Z72.0]  Yes      Resolved Hospital Problems    Diagnosis Date Resolved POA   • History of stomach cancer [Z85.028] 2020 Unknown        Brief Hospital Course to date:  Ro Walker is a 70 y.o. female with PMH of SLE on Plaquenil, tobacco abuse, history of cervical cancer who was admittd on  for incarcerated inguinal hernia s/p open small bowel resection and left preperitoneal inguinal hernia repair with mesh, complicated by ileus vs SBO.     Incarcerated inguinal hernia s/p open small bowel resection and left peritoneal inguinal hernia repair with mesh on    Post-op ileus vs SBO  -continue invanz until   -on TPN, continue liquid diet per gen surg recommendations     Acute Left lower extremity DVT  -started eliquis for treatment     Elevated procal   Increased risk for intraabdominal infection   -ID following- on invanz til , unclear source of infection     SLE  -holding plaquenil, resume when tolerating po     Acute blood loss anemia   NING  -H&H stable     XI  - resolved     DVT Prophylaxis:  eliquis     Disposition: I expect the patient to be discharged pending further improvement of bowel function.    CODE STATUS:   Code Status and Medical Interventions:   Ordered at: 06/13/20 5035     Code Status:    CPR     Medical Interventions (Level of Support Prior to Arrest):    Full         Electronically signed by Ellen Mendez MD, 06/18/20, 08:00.

## 2020-06-19 ENCOUNTER — APPOINTMENT (OUTPATIENT)
Dept: GENERAL RADIOLOGY | Facility: HOSPITAL | Age: 70
End: 2020-06-19

## 2020-06-19 VITALS
WEIGHT: 107 LBS | RESPIRATION RATE: 16 BRPM | DIASTOLIC BLOOD PRESSURE: 80 MMHG | OXYGEN SATURATION: 95 % | HEART RATE: 84 BPM | BODY MASS INDEX: 20.2 KG/M2 | TEMPERATURE: 98.9 F | HEIGHT: 61 IN | SYSTOLIC BLOOD PRESSURE: 164 MMHG

## 2020-06-19 PROBLEM — N17.9 AKI (ACUTE KIDNEY INJURY): Status: RESOLVED | Noted: 2020-06-13 | Resolved: 2020-06-19

## 2020-06-19 PROBLEM — K46.0 INCARCERATED HERNIA: Status: RESOLVED | Noted: 2020-06-13 | Resolved: 2020-06-19

## 2020-06-19 LAB
ALBUMIN SERPL-MCNC: 2.8 G/DL (ref 3.5–5.2)
ALBUMIN/GLOB SERPL: 1 G/DL
ALP SERPL-CCNC: 45 U/L (ref 39–117)
ALT SERPL W P-5'-P-CCNC: 28 U/L (ref 1–33)
ANION GAP SERPL CALCULATED.3IONS-SCNC: 11 MMOL/L (ref 5–15)
AST SERPL-CCNC: 52 U/L (ref 1–32)
BASOPHILS # BLD AUTO: 0.01 10*3/MM3 (ref 0–0.2)
BASOPHILS NFR BLD AUTO: 0.2 % (ref 0–1.5)
BILIRUB SERPL-MCNC: 0.4 MG/DL (ref 0.2–1.2)
BUN BLD-MCNC: 10 MG/DL (ref 8–23)
BUN/CREAT SERPL: 21.7 (ref 7–25)
CA-I SERPL ISE-MCNC: 1.23 MMOL/L (ref 1.12–1.32)
CALCIUM SPEC-SCNC: 8.5 MG/DL (ref 8.6–10.5)
CHLORIDE SERPL-SCNC: 101 MMOL/L (ref 98–107)
CK SERPL-CCNC: 84 U/L (ref 20–180)
CO2 SERPL-SCNC: 27 MMOL/L (ref 22–29)
CREAT BLD-MCNC: 0.46 MG/DL (ref 0.57–1)
DEPRECATED RDW RBC AUTO: 44.5 FL (ref 37–54)
EOSINOPHIL # BLD AUTO: 0.1 10*3/MM3 (ref 0–0.4)
EOSINOPHIL NFR BLD AUTO: 1.6 % (ref 0.3–6.2)
ERYTHROCYTE [DISTWIDTH] IN BLOOD BY AUTOMATED COUNT: 13.2 % (ref 12.3–15.4)
GFR SERPL CREATININE-BSD FRML MDRD: 134 ML/MIN/1.73
GLOBULIN UR ELPH-MCNC: 2.8 GM/DL
GLUCOSE BLD-MCNC: 126 MG/DL (ref 65–99)
GLUCOSE BLDC GLUCOMTR-MCNC: 126 MG/DL (ref 70–130)
HCT VFR BLD AUTO: 29.9 % (ref 34–46.6)
HGB BLD-MCNC: 9.8 G/DL (ref 12–15.9)
IMM GRANULOCYTES # BLD AUTO: 0.06 10*3/MM3 (ref 0–0.05)
IMM GRANULOCYTES NFR BLD AUTO: 1 % (ref 0–0.5)
LYMPHOCYTES # BLD AUTO: 0.9 10*3/MM3 (ref 0.7–3.1)
LYMPHOCYTES NFR BLD AUTO: 14.4 % (ref 19.6–45.3)
MAGNESIUM SERPL-MCNC: 2.9 MG/DL (ref 1.6–2.4)
MCH RBC QN AUTO: 30.2 PG (ref 26.6–33)
MCHC RBC AUTO-ENTMCNC: 32.8 G/DL (ref 31.5–35.7)
MCV RBC AUTO: 92.3 FL (ref 79–97)
MONOCYTES # BLD AUTO: 0.68 10*3/MM3 (ref 0.1–0.9)
MONOCYTES NFR BLD AUTO: 10.8 % (ref 5–12)
NEUTROPHILS # BLD AUTO: 4.52 10*3/MM3 (ref 1.7–7)
NEUTROPHILS NFR BLD AUTO: 72 % (ref 42.7–76)
NRBC BLD AUTO-RTO: 0 /100 WBC (ref 0–0.2)
PHOSPHATE SERPL-MCNC: 3.5 MG/DL (ref 2.5–4.5)
PLAT MORPH BLD: NORMAL
PLATELET # BLD AUTO: 146 10*3/MM3 (ref 140–450)
PMV BLD AUTO: 11.3 FL (ref 6–12)
POTASSIUM BLD-SCNC: 3.7 MMOL/L (ref 3.5–5.2)
PROCALCITONIN SERPL-MCNC: 0.31 NG/ML (ref 0.1–0.25)
PROT SERPL-MCNC: 5.6 G/DL (ref 6–8.5)
RBC # BLD AUTO: 3.24 10*6/MM3 (ref 3.77–5.28)
RBC MORPH BLD: NORMAL
SODIUM BLD-SCNC: 139 MMOL/L (ref 136–145)
WBC MORPH BLD: NORMAL
WBC NRBC COR # BLD: 6.27 10*3/MM3 (ref 3.4–10.8)

## 2020-06-19 PROCEDURE — 82962 GLUCOSE BLOOD TEST: CPT

## 2020-06-19 PROCEDURE — 71045 X-RAY EXAM CHEST 1 VIEW: CPT

## 2020-06-19 PROCEDURE — 80053 COMPREHEN METABOLIC PANEL: CPT | Performed by: INTERNAL MEDICINE

## 2020-06-19 PROCEDURE — 25010000002 ERTAPENEM PER 500 MG

## 2020-06-19 PROCEDURE — 84145 PROCALCITONIN (PCT): CPT | Performed by: INTERNAL MEDICINE

## 2020-06-19 PROCEDURE — 83735 ASSAY OF MAGNESIUM: CPT | Performed by: INTERNAL MEDICINE

## 2020-06-19 PROCEDURE — 25010000002 DAPTOMYCIN PER 1 MG: Performed by: INTERNAL MEDICINE

## 2020-06-19 PROCEDURE — 99239 HOSP IP/OBS DSCHRG MGMT >30: CPT | Performed by: INTERNAL MEDICINE

## 2020-06-19 PROCEDURE — 82550 ASSAY OF CK (CPK): CPT | Performed by: INTERNAL MEDICINE

## 2020-06-19 PROCEDURE — 85007 BL SMEAR W/DIFF WBC COUNT: CPT | Performed by: SURGERY

## 2020-06-19 PROCEDURE — 82330 ASSAY OF CALCIUM: CPT | Performed by: SURGERY

## 2020-06-19 PROCEDURE — 84100 ASSAY OF PHOSPHORUS: CPT | Performed by: SURGERY

## 2020-06-19 PROCEDURE — 25010000003 MAGNESIUM SULFATE 4 GM/100ML SOLUTION: Performed by: SURGERY

## 2020-06-19 PROCEDURE — 94799 UNLISTED PULMONARY SVC/PX: CPT

## 2020-06-19 PROCEDURE — 85025 COMPLETE CBC W/AUTO DIFF WBC: CPT | Performed by: SURGERY

## 2020-06-19 RX ORDER — PANTOPRAZOLE SODIUM 40 MG/1
40 TABLET, DELAYED RELEASE ORAL DAILY
Qty: 30 TABLET | Refills: 0 | Status: SHIPPED | OUTPATIENT
Start: 2020-06-19 | End: 2020-07-23 | Stop reason: SDUPTHER

## 2020-06-19 RX ADMIN — ALBUTEROL SULFATE 2.5 MG: 2.5 SOLUTION RESPIRATORY (INHALATION) at 07:44

## 2020-06-19 RX ADMIN — ALBUTEROL SULFATE 2.5 MG: 2.5 SOLUTION RESPIRATORY (INHALATION) at 13:18

## 2020-06-19 RX ADMIN — ERTAPENEM SODIUM 1 G: 1 INJECTION, POWDER, LYOPHILIZED, FOR SOLUTION INTRAMUSCULAR; INTRAVENOUS at 14:48

## 2020-06-19 RX ADMIN — APIXABAN 10 MG: 5 TABLET, FILM COATED ORAL at 09:28

## 2020-06-19 RX ADMIN — SODIUM CHLORIDE, PRESERVATIVE FREE 10 ML: 5 INJECTION INTRAVENOUS at 09:28

## 2020-06-19 RX ADMIN — DAPTOMYCIN 300 MG: 500 INJECTION, POWDER, LYOPHILIZED, FOR SOLUTION INTRAVENOUS at 14:48

## 2020-06-19 RX ADMIN — PANTOPRAZOLE SODIUM 40 MG: 40 INJECTION, POWDER, FOR SOLUTION INTRAVENOUS at 06:25

## 2020-06-19 RX ADMIN — HYDROXYCHLOROQUINE SULFATE 200 MG: 200 TABLET ORAL at 09:28

## 2020-06-19 RX ADMIN — MAGNESIUM SULFATE 4 G: 4 INJECTION INTRAVENOUS at 01:36

## 2020-06-19 NOTE — PROGRESS NOTES
"Cc:  POD#5 bowel resection and left inguinal hernia repair with mesh  \"I want to take a shower\"    Abx #5, Invanz #2, Dapto #1    She is alone in the room.  She looks and feels fine.  She got to eat a regular diet.  She thought she was can have a bowel movement but did not.  No abdominal distention or discomfort.  Passing gas.  She would like to walk.  No pulmonary complaints.  No left lower extremity complaints.  No fever pulse 90 respirations 16 blood pressure 164/80.  She is in no apparent distress and looks well today.  Abdomen is soft and benign.  Minimal if any residual erythema of the lower abdomen, none in the left groin and none in the left medial thigh today.  Left lower extremity is not swollen today.  Lactate normal at 1.6 at 1308, was up to 2.1 at 0846.  Procalcitonin elevated 0.49 but decreasing.  CMP normal except for glucose of 107 creatinine of 0.49 calcium 8.4 total protein 5.6 albumin 2.90 white blood count normal at 6 with 71 segs 15 lymphs 11 monocytes no bands H&H stable at 9.9 and 30.7 platelet count 148    Imp/Rec:    Postop day #5 open reduction of strangulated incarcerated left inguinal hernia with associated small bowel obstruction and necrotic knuckle of bowel status post small bowel resection and mesh repair complicated by intraoperative bleeding from the junction of the left iliac and femoral veins.    -Erythema and edema have resolved today.  Normal lactate, decreasing procalcitonin, no fever, normal white count without left shift.  Cubicin added today.    -Left common femoral DVT.  On Eliquis.  No edema today.  Remains asymptomatic.    -NING.  No evidence of bleeding since surgery.  H&H stable on anticoagulation.    -Thrombocytopenia.  Resolved since discontinuing Lovenox.    -SBO.  Clinically resolved.  Diet advanced.  Try milk of magnesia.    -Severe protein malnutrition.  On PPN.  If tolerates diet well can discontinue.    Overall significant clinical improvement.  May shower.  Home " at any time from my standpoint.  Antibiotics per ID.  Continue Eliquis for at least 3 months and repeat duplex imaging.

## 2020-06-19 NOTE — DISCHARGE SUMMARY
[facility] Medicine Services  DISCHARGE SUMMARY    Patient Name: Ro Walker  : 1950  MRN: [id number]    Date of Admission: 2020  9:51 PM  Date of Discharge:  2020  Primary Care Physician: Tracy Schrader MD    Consults     Date and Time Order Name Status Description    6/15/2020 1011 Inpatient Infectious Diseases Consult Completed     2020 2324 Inpatient Bariatric Surgery Consult Completed     2020 2250 Inpatient General Surgery Consult Completed           Hospital Course     Presenting Problem:   Incarcerated hernia [K46.0]    Active Hospital Problems    Diagnosis  POA   • **Incarcerated hernia [K46.0]  Unknown   • Lupus (CMS/HCC) [M32.9]  Unknown   • History of cervical cancer [Z85.41]  Not Applicable   • XI (acute kidney injury) (CMS/HCC) [N17.9]  Unknown   • Tobacco abuse [Z72.0]  Yes      Resolved Hospital Problems    Diagnosis Date Resolved POA   • History of stomach cancer [Z85.028] 2020 Unknown          Hospital Course:  Ro Walker is a 70 y.o. female  with PMH of SLE on Plaquenil, tobacco abuse, history of cervical cancer who was admittd on  for incarcerated inguinal hernia s/p open small bowel resection and left preperitoneal inguinal hernia repair with mesh, complicated by ileus vs SBO.      Incarcerated inguinal hernia s/p open small bowel resection and left peritoneal inguinal hernia repair with mesh on    Post-op ileus vs SBO  -continue Invanz and Daptomycin until ,  CM has arranged for outpatient ABX. She has a PICC line in place now.  Pt to follow up with ID/Dr. Quinteros in one week  -diet advanced, pt tolerating well. Had return of bowel function with two BMs overnight. Pt was briefly on TPN while her PO intake was low, but, this since has improved.      Acute Left lower extremity DVT  -started eliquis for treatment, will need anticoagulation for at least 3 months.       Elevated procal   Increased risk for  intraabdominal infection   -ID following- on Invanz and Daptomycin until 6/28, unclear source of infection      SLE  -holding plaquenil, can resume upon discharge     Acute blood loss anemia   NING  -H&H stable      XI - resolved       Discharge Follow Up Recommendations for outpatient labs/diagnostics:  1. Follow up with PCP in one week  2. Follow up with ID/Dr. Quinteros in one week  3. Follow up with Dr. Lara as per his instructions    Day of Discharge     HPI:   Doing well this am, denies any issues overnight. Has had two BMs. Wants to go home.     Review of Systems  Gen- No fevers, chills  CV- No chest pain, palpitations  Resp- No cough, dyspnea  GI- No N/V/D, abd pain    Vital Signs:   Temp:  [98.9 °F (37.2 °C)] 98.9 °F (37.2 °C)  Heart Rate:  [84-95] 90  Resp:  [16-18] 16  BP: (160-164)/(65-80) 164/80     Physical Exam:  Constitutional: No acute distress, awake, alert  HENT: NCAT, mucous membranes moist  Respiratory: Clear to auscultation bilaterally, respiratory effort normal   Cardiovascular: RRR, no murmurs  Gastrointestinal: normoactive bowel sounds, soft, nontender, nondistended  Psychiatric: Appropriate affect, cooperative  Neurologic: Oriented x 3, Cranial Nerves grossly intact to confrontation, speech clear  Skin: No rashes on exposed skin, incision at midabdominal is clean, dry, and intact        Pertinent  and/or Most Recent Results     Results from last 7 days   Lab Units 06/19/20  0414 06/18/20  0623 06/17/20  0349 06/16/20  0630 06/16/20  0209 06/15/20  0853 06/14/20  1249 06/14/20  0619 06/13/20  2304   WBC 10*3/mm3 6.27 5.98 5.69 4.79  --  6.22 6.20 5.17  --    HEMOGLOBIN g/dL 9.8* 9.9* 9.4* 9.7* 9.1* 7.7* 8.6* 8.4*  --    HEMATOCRIT % 29.9* 30.7* 29.4* 30.3* 27.6* 24.4* 27.1* 26.6*  --    PLATELETS 10*3/mm3 146 148 95* 94*  --  99* 96* 88*  --    SODIUM mmol/L 139 140 137 135*  --  136  --  137 137   POTASSIUM mmol/L 3.7 3.8 3.7 3.8  --  4.0  --  4.2 4.2   CHLORIDE mmol/L 101 103 101 99  --   101  --  99 93*   CO2 mmol/L 27.0 28.0 27.0 26.0  --  27.0  --  24.0 26.0   BUN mg/dL 10 9 9 15  --  17  --  33* 33*   CREATININE mg/dL 0.46* 0.49* 0.65 0.69  --  0.67  --  0.97 1.09*   GLUCOSE mg/dL 126* 107* 158* 89  --  120*  --  111* 85   CALCIUM mg/dL 8.5* 8.4* 7.8* 7.9*  --  7.8*  --  7.5* 8.7     Results from last 7 days   Lab Units 06/19/20  0414 06/18/20  0623 06/17/20  0349 06/16/20  0630 06/15/20  0853 06/14/20  0619 06/13/20  2304 06/13/20  2215   BILIRUBIN mg/dL 0.4 0.4 0.5 0.7 0.3 0.7 0.5  --    ALK PHOS U/L 45 43 38* 38* 32* 32* 57  --    ALT (SGPT) U/L 28 14 12 13 10 8 12  --    AST (SGOT) U/L 52* 25 25 28 26 16 28  --    PROTIME Seconds  --   --   --   --   --   --   --  13.1   INR   --   --   --   --   --   --   --  1.02   APTT seconds  --   --   --   --   --   --   --  25.0     Results from last 7 days   Lab Units 06/16/20  0630   TRIGLYCERIDES mg/dL 96     Results from last 7 days   Lab Units 06/19/20  0414 06/18/20  1212 06/18/20  0623 06/17/20  0349   PROCALCITONIN ng/mL 0.31*  --  0.49* 1.16*   LACTATE mmol/L  --  1.6 2.1* 1.1       Brief Urine Lab Results  (Last result in the past 365 days)      Color   Clarity   Blood   Leuk Est   Nitrite   Protein   CREAT   Urine HCG        06/15/20 1422 Yellow Cloudy Moderate (2+) Small (1+) Negative 30 mg/dL (1+)               Microbiology Results Abnormal     Procedure Component Value - Date/Time    Blood Culture - Blood, Hand, Right [[id number]] Collected:  06/15/20 1711    Lab Status:  Preliminary result Specimen:  Blood from Hand, Right Updated:  06/18/20 1730     Blood Culture No growth at 3 days    Blood Culture - Blood, Arm, Left [[id number]] Collected:  06/15/20 1711    Lab Status:  Preliminary result Specimen:  Blood from Arm, Left Updated:  06/18/20 1730     Blood Culture No growth at 3 days    Respiratory Culture - Sputum, Cough [[id number]] Collected:  06/15/20 1837    Lab Status:  Final result Specimen:  Sputum from Cough Updated:  06/17/20 1305      Respiratory Culture Rare Normal Respiratory Candice     Gram Stain Many (4+) WBCs per low power field      Moderate (3+) Epithelial cells per low power field      No organisms seen    Urine Culture - Urine, Urine, Clean Catch [[id number]]  (Normal) Collected:  06/15/20 1422    Lab Status:  Final result Specimen:  Urine, Clean Catch Updated:  06/16/20 2242     Urine Culture No growth    COVID PRE-OP / PRE-PROCEDURE SCREENING ORDER (NO ISOLATION) - Swab, Nasopharynx [[id number]] Collected:  06/13/20 2334    Lab Status:  Final result Specimen:  Swab from Nasopharynx Updated:  06/14/20 0045    Narrative:       The following orders were created for panel order COVID PRE-OP / PRE-PROCEDURE SCREENING ORDER (NO ISOLATION) - Swab, Nasopharynx.  Procedure                               Abnormality         Status                     ---------                               -----------         ------                     COVID-19,CEPHEID,ANIL IN-...[[id number]]  Normal              Final result                 Please view results for these tests on the individual orders.    COVID-19,CEPHEID,ANIL IN-HOUSE(OR EMERGENT/ADD-ON),NP SWAB IN TRANSPORT MEDIA 3-4 HR TAT - Swab, Nasopharynx [[id number]]  (Normal) Collected:  06/13/20 2334    Lab Status:  Final result Specimen:  Swab from Nasopharynx Updated:  06/14/20 0045     COVID19 Not Detected          Imaging Results (All)     Procedure Component Value Units Date/Time    XR Abdomen KUB [[id number]] Collected:  06/17/20 0820     Updated:  06/17/20 1709    Narrative:       EXAMINATION: XR ABDOMEN KUB-      INDICATION: Followup ileus; K40.90-Unilateral inguinal hernia, without  obstruction or gangrene, not specified as recurrent.      COMPARISON: None.     FINDINGS: Supine views of the abdomen demonstrate nonobstructive bowel  gas pattern with gaseous distended proximal colonic segments including  the ascending and transverse colon with intermixed areas of residual  contrast from recent oral  administration in the proximal ascending colon  without gross intraperitoneal free air.       Impression:       Nonobstructive bowel gas pattern with gaseous distended  proximal colonic segments and residual oral contrast within the  ascending colon favoring ileus pattern.     D:  06/17/2020  E:  06/17/2020     This report was finalized on 6/17/2020 5:06 PM by Dr. Derick Escobar.       XR Chest 1 View [[id number]] Collected:  06/15/20 1711     Updated:  06/16/20 2242    Narrative:       EXAMINATION: XR CHEST 1 VW-06/15/2020:     INDICATION: Sepsis; K40.90-Unilateral inguinal hernia, without  obstruction or gangrene, not specified as recurrent.     COMPARISON: 01/07/2018.     FINDINGS: The heart shadow is borderline enlarged. There is increased  diffuse pulmonary interstitial disease that may reflect interstitial  edema or changes of ARDS. There is very mild bibasilar discoid  atelectasis. No other focal lung disease is seen. Biapical pleural  thickening, right greater than left, is smooth-margined and stable.       Impression:       1.  Interval development of diffuse interstitial disease, resembling  early changes of interstitial edema, possibly ARDS. By history, chest CT  scan has been ordered for later today.  2.  Mild bibasilar discoid atelectasis.      D:  06/15/2020  E:  06/16/2020     This report was finalized on 6/16/2020 10:39 PM by Dr. Jamal Harley MD.       CT Abdomen Pelvis With Contrast [[id number]] Collected:  06/16/20 0817     Updated:  06/16/20 1446    Narrative:       EXAMINATION: CT CHEST W CONTRAST-, CT ABDOMEN AND PELVIS W CONTRAST-      INDICATION: Postop bandemia, procalcitonin elevation, smoker, SLE;  K40.90-Unilateral inguinal hernia, without obstruction or gangrene, not  specified as recurrent.      TECHNIQUE: CT chest, abdomen and pelvis with intravenous contrast.     The radiation dose reduction device was turned on for each scan per the  ALARA (As Low as Reasonably Achievable) protocol.      COMPARISON: Chest x-ray dated 06/15/2020.     FINDINGS: CHEST: Thyroid is homogeneous in attenuation. No bulky  mediastinal adenopathy. Central airways are patent. Esophagus in normal  course and caliber. Patent nonaneurysmal thoracic aorta with patent  great vessel origins. Central pulmonary arteries are grossly patent.  Cardiac size borderline enlarged without pericardial effusion. Extended  lung windows demonstrate opacifications at the lung bases right greater  than left with air bronchograms present and trace volume right greater  than left bilateral pleural effusions. Considerations for atelectasis  versus airspace disease intermixed with healed granulomatous  involvement. Degenerative changes of the thoracic spine without  aggressive osseous lesion. No soft tissue body wall findings of concern.     ABDOMEN AND PELVIS: Liver demonstrates mild diffuse low-attenuation of  hepatic steatosis with several subcentimeter areas of likely cystic  lesions too small to characterize. Gallbladder is folded in  configuration without cholelithiasis clearly evident. No biliary  dilatation. Pancreas and spleen unremarkable. Adrenals without distinct  nodule. Kidneys without hydronephrosis or hydroureter. No bulky  retroperitoneal adenopathy. Atherosclerotic abdominal aorta measuring up  to 3.7 cm aneurysmal in the infrarenal portion. Portal veins and IVC  patent. GI tract evaluation demonstrates moderate distended gastric  lumen with air-fluid level. Dilated fluid-filled bowel in the left  midabdomen extending to the lower midabdomen there is an apparent  transition point at the noted anastomosis. Limited evaluation for  extraluminal contact given obstructive bowel gas pattern limiting flow  of intraluminal contrast, however, there is postsurgical change of the  intraabdominal wall and extraluminal fluid in the left lower quadrant  anteriorly with mixed gas locules, however no definitive abscess is  clearly evident despite  trace volume ascites. Pelvic viscera otherwise  unremarkable. Degenerative changes of the spine without aggressive  osseous lesion. Asymmetric edema within the left proximal thigh, may  represent postsurgical change, however, underlying deep vein thrombosis  could have a similar appearance.       Impression:       1. Dilated fluid-filled small bowel within the left midabdomen extending  to the lower midabdomen where there is anastomosis in the right lower  midabdomen concerning for transition point and obstructive or at least  partially obstructive gas pattern with postsurgical changes including  extraluminal gas and fluid which should be correlated with surgical  history to evaluate for appropriate time point, however no definitive  peripherally enhancing focal fluid collection to suggest abscess,  however, likely premature for development of definitive abscess given  postsurgical changes. Limited evaluation of intraluminal contrast extent  of involvement due to obstructive bowel gas pattern.  2. Opacifications of the lung bases right greater than left of moderate  to severe atelectatic changes of subsegmental involvement with trace  bilateral pleural effusions.  3. Asymmetric edema within the lower left abdomen and left thigh body  wall tissues may represent postsurgical change given left inguinal soft  tissue gas, however, underlying deep vein thrombosis could have a  similar appearance and may be further evaluated with duplex.     D:  06/16/2020  E:  06/16/2020     This report was finalized on 6/16/2020 2:43 PM by Dr. Derick Escobar.       CT Chest With Contrast [[id number]] Collected:  06/16/20 0817     Updated:  06/16/20 1446    Narrative:       EXAMINATION: CT CHEST W CONTRAST-, CT ABDOMEN AND PELVIS W CONTRAST-      INDICATION: Postop bandemia, procalcitonin elevation, smoker, SLE;  K40.90-Unilateral inguinal hernia, without obstruction or gangrene, not  specified as recurrent.      TECHNIQUE: CT chest, abdomen  and pelvis with intravenous contrast.     The radiation dose reduction device was turned on for each scan per the  ALARA (As Low as Reasonably Achievable) protocol.     COMPARISON: Chest x-ray dated 06/15/2020.     FINDINGS: CHEST: Thyroid is homogeneous in attenuation. No bulky  mediastinal adenopathy. Central airways are patent. Esophagus in normal  course and caliber. Patent nonaneurysmal thoracic aorta with patent  great vessel origins. Central pulmonary arteries are grossly patent.  Cardiac size borderline enlarged without pericardial effusion. Extended  lung windows demonstrate opacifications at the lung bases right greater  than left with air bronchograms present and trace volume right greater  than left bilateral pleural effusions. Considerations for atelectasis  versus airspace disease intermixed with healed granulomatous  involvement. Degenerative changes of the thoracic spine without  aggressive osseous lesion. No soft tissue body wall findings of concern.     ABDOMEN AND PELVIS: Liver demonstrates mild diffuse low-attenuation of  hepatic steatosis with several subcentimeter areas of likely cystic  lesions too small to characterize. Gallbladder is folded in  configuration without cholelithiasis clearly evident. No biliary  dilatation. Pancreas and spleen unremarkable. Adrenals without distinct  nodule. Kidneys without hydronephrosis or hydroureter. No bulky  retroperitoneal adenopathy. Atherosclerotic abdominal aorta measuring up  to 3.7 cm aneurysmal in the infrarenal portion. Portal veins and IVC  patent. GI tract evaluation demonstrates moderate distended gastric  lumen with air-fluid level. Dilated fluid-filled bowel in the left  midabdomen extending to the lower midabdomen there is an apparent  transition point at the noted anastomosis. Limited evaluation for  extraluminal contact given obstructive bowel gas pattern limiting flow  of intraluminal contrast, however, there is postsurgical change of  the  intraabdominal wall and extraluminal fluid in the left lower quadrant  anteriorly with mixed gas locules, however no definitive abscess is  clearly evident despite trace volume ascites. Pelvic viscera otherwise  unremarkable. Degenerative changes of the spine without aggressive  osseous lesion. Asymmetric edema within the left proximal thigh, may  represent postsurgical change, however, underlying deep vein thrombosis  could have a similar appearance.       Impression:       1. Dilated fluid-filled small bowel within the left midabdomen extending  to the lower midabdomen where there is anastomosis in the right lower  midabdomen concerning for transition point and obstructive or at least  partially obstructive gas pattern with postsurgical changes including  extraluminal gas and fluid which should be correlated with surgical  history to evaluate for appropriate time point, however no definitive  peripherally enhancing focal fluid collection to suggest abscess,  however, likely premature for development of definitive abscess given  postsurgical changes. Limited evaluation of intraluminal contrast extent  of involvement due to obstructive bowel gas pattern.  2. Opacifications of the lung bases right greater than left of moderate  to severe atelectatic changes of subsegmental involvement with trace  bilateral pleural effusions.  3. Asymmetric edema within the lower left abdomen and left thigh body  wall tissues may represent postsurgical change given left inguinal soft  tissue gas, however, underlying deep vein thrombosis could have a  similar appearance and may be further evaluated with duplex.     D:  06/16/2020  E:  06/16/2020     This report was finalized on 6/16/2020 2:43 PM by Dr. Derick Escobar.             Results for orders placed during the hospital encounter of 06/13/20   Duplex Venous Lower Extremity - Bilateral CAR    Narrative · Acute left lower extremity deep vein thrombosis noted in the common    femoral and deep femoral.  · Acute left lower extremity superficial thrombophlebitis noted in the   saphenofemoral junction.  · Right and left popliteal fossa fluid collection.          Results for orders placed during the hospital encounter of 06/13/20   Duplex Venous Lower Extremity - Bilateral CAR    Narrative · Acute left lower extremity deep vein thrombosis noted in the common   femoral and deep femoral.  · Acute left lower extremity superficial thrombophlebitis noted in the   saphenofemoral junction.  · Right and left popliteal fossa fluid collection.               Plan for Follow-up of Pending Labs/Results:   Order Current Status    Blood Culture - Blood, Arm, Left Preliminary result    Blood Culture - Blood, Hand, Right Preliminary result        Discharge Details        Discharge Medications      New Medications      Instructions Start Date   apixaban 5 MG tablet tablet  Commonly known as:  ELIQUIS   10 mg, Oral, Every 12 Hours Scheduled      apixaban 5 MG tablet tablet  Commonly known as:  ELIQUIS   5 mg, Oral, Every 12 Hours Scheduled   Start Date:  June 24, 2020     DAPTOmycin 300 mg in sodium chloride 0.9 % 50 mL   6 mg/kg (300 mg), Intravenous, Every 24 Hours      ertapenem 1 gm/100ml solution IV  Commonly known as:  INVanz   1 g, Intravenous, Every 24 Hours Scheduled      pantoprazole 40 MG EC tablet  Commonly known as:  PROTONIX   40 mg, Oral, Daily         Continue These Medications      Instructions Start Date   cholecalciferol 25 MCG (1000 UT) tablet  Commonly known as:  VITAMIN D3   1,000 Units, Oral, Daily      hydroxychloroquine 200 MG tablet  Commonly known as:  PLAQUENIL   100 mg, Oral, Daily      ibuprofen 600 MG tablet  Commonly known as:  ADVIL,MOTRIN   600 mg, Oral, Every 6 Hours PRN      Stool Softener 250 MG capsule  Generic drug:  docusate sodium   250 mg, Oral, 2 Times Daily             No Known Allergies      Discharge Disposition:      Diet:  Hospital:  Diet Order   Procedures   •  Diet Regular; GI Soft       Activity:      Restrictions or Other Recommendations:         CODE STATUS:    Code Status and Medical Interventions:   Ordered at: 06/13/20 4680     Code Status:    CPR     Medical Interventions (Level of Support Prior to Arrest):    Full       No future appointments.        Time Spent on Discharge:  35 minutes    Electronically signed by Ellen Mendez MD, 06/19/20, 7:34 AM.

## 2020-06-19 NOTE — PLAN OF CARE
Patient resting well through the night. Patient has no complaints of pain. Patient ambulating well with standby assistance. VSS with good UOP. Patient offered to shower but patient requested to wait until the am. Magnesium replacement in process. Will continue to monitor.   Problem: Patient Care Overview  Goal: Plan of Care Review  Outcome: Ongoing (interventions implemented as appropriate)  Goal: Individualization and Mutuality  Outcome: Ongoing (interventions implemented as appropriate)  Goal: Discharge Needs Assessment  Outcome: Ongoing (interventions implemented as appropriate)  Goal: Interprofessional Rounds/Family Conf  Outcome: Ongoing (interventions implemented as appropriate)     Problem: Gastrointestinal Bleeding (Adult)  Goal: Signs and Symptoms of Listed Potential Problems Will be Absent, Minimized or Managed (Gastrointestinal Bleeding)  Outcome: Ongoing (interventions implemented as appropriate)     Problem: Anemia (Adult)  Goal: Identify Related Risk Factors and Signs and Symptoms  Outcome: Ongoing (interventions implemented as appropriate)  Goal: Symptom Improvement  Outcome: Ongoing (interventions implemented as appropriate)

## 2020-06-19 NOTE — PROGRESS NOTES
INFECTIOUS DISEASE Progress Note    Ro Walker  1950  [id number]    Consult: 6/16/20  Admit date: 6/13/2020    Requesting Provider: Andra Raines MD  Evaluating physician: Jose De Jesus Quinteros MD  Reason for Consultation: necrotic bowel, left inguinal cellulitis, infected hematoma  Chief Complaint: above      Subjective   History of present illness:  Patient is a 70 y.o.  Yr old female with SLE (on Plaquenil), carcinoma in situe cervix, smoker, with increased left groin lump since 6/10/20 which became increasingly tender.  Initially seen at [facility] ED where CT revealed incarderated small bowel and left inguinal hernia.  Transferred to [facility] and went to surgery by Dr. Huy Lara on 6/14/20 where he found necrotic bowel, and probable infected hematoma.  The patient was placed on zosyn.  She was confused off and on post surgery.  Procalcitonin level 3.67.  No positive cultures.  Had persistent bandemia and fever postop.  I was consulted 6/15/20.    6/16/2020 history reviewed.  On Zosyn for intra-abdominal infection after incarcerated bowel with resection of necrotic bowel on 6/14/2020.  Left inguinal hernia repair with mesh.  Some abdominal pain.  No high fever.  Lactic acid was normal today along with a decrease in procalcitonin to 2.06.  White blood cell count 4.79 with a hemoglobin of 9.7.  Platelets 94,000.  Blood cultures x2 from 6/15-, respiratory culture negative from 6/15.  Urinalysis did have 13-20 WBCs.  Urine culture negative to date.    6/17/2020 history reviewed.  Patient with DVT in left lower extremity with increased Lovenox treatment.  Slowly passing gas.  On Zosyn for intra-abdominal infection after incarcerated bowel with resection on 6/14/2020.  Changing to ertapenem 1 g IV daily to continue until 6/28/2020.  This will facilitate outpatient therapy.    6/18/2020 history reviewed.  DVT left lower extremity.  Had swelling in the left leg.  On ertapenem for intra-abdominal infection after  incarcerated bowel resection on 6/14.  No fever.  Adding daptomycin for staph and enterococcal coverage today.  Daptomycin and ertapenem to continue until approximately 6/28 and reassess.    6/19/2020 history reviewed.  Patient continues on ertapenem and daptomycin until 6/28 and reassess.  No high fevers.  Left leg swelling improved.  Denies nausea, vomiting, diarrhea, shortness of breath, fever or chills.    Past Medical History:   Diagnosis Date   • Cervical cancer (CMS/HCC)    • Lupus (CMS/HCC)    • Polyp of vocal cord        Past Surgical History:   Procedure Laterality Date   • CATARACT EXTRACTION  2008   • CERVICAL CONE BIOPSY     • CERVICAL CONE BIOPSY     • COLON RESECTION SMALL BOWEL N/A 6/13/2020    Procedure: COLON RESECTION SMALL BOWEL;  Surgeon: Huy Lara MD;  Location:  [facility];  Service: General;  Laterality: N/A;   • INGUINAL HERNIA REPAIR Left 6/13/2020    Procedure: INGUINAL HERNIA REPAIR LEFT;  Surgeon: Huy Lara MD;  Location:  [facility];  Service: General;  Laterality: Left;   • LAPAROSCOPIC ASSISTED VAGINAL HYSTERECTOMY N/A 2/26/2018    Procedure: LAPAROSCOPIC ASSISTED VAGINAL HYSTERECTOMY, BILATERAL SALPINGO OOPHORECTOMY;  Surgeon: María Dietrich MD;  Location:  [facility];  Service:    • RETINAL DETACHMENT REPAIR  2006   • THROAT SURGERY  2017   • TUBAL ABDOMINAL LIGATION         Pediatric History   Patient Guardian Status   • Not on file     Other Topics Concern   • Not on file   Social History Narrative   • Not on file   Smoker x 55 y, 0.5-1 ppd.  No drug or etoh.    family history includes Cancer in her brother and brother; Stomach cancer in her sister.    No Known Allergies      There is no immunization history on file for this patient.    Medication:    Current Facility-Administered Medications:   •  acetaminophen (TYLENOL) tablet 650 mg, 650 mg, Oral, Q4H PRN **OR** acetaminophen (TYLENOL) 160 MG/5ML solution 650 mg, 650 mg, Oral, Q4H PRN **OR** acetaminophen  (TYLENOL) suppository 650 mg, 650 mg, Rectal, Q4H PRN, Huy Lara MD  •  Adult Peripheral 2-in-1 TPN, , Intravenous, Cyclic TPN - See Admin Instructions, Huy Lara MD, Last Rate: 100 mL/hr at 06/18/20 1724  •  albuterol (PROVENTIL) nebulizer solution 0.083% 2.5 mg/3mL, 2.5 mg, Nebulization, Q6H While Awake - RT, Huy Lara MD, 2.5 mg at 06/19/20 0744  •  apixaban (ELIQUIS) tablet 10 mg, 10 mg, Oral, Q12H, 10 mg at 06/19/20 0928 **FOLLOWED BY** [START ON 6/24/2020] apixaban (ELIQUIS) tablet 5 mg, 5 mg, Oral, Q12H, Huy Lara MD  •  DAPTOmycin (CUBICIN) 300 mg in sodium chloride 0.9 % 50 mL IVPB, 6 mg/kg, Intravenous, Q24H, Jose De Jesus Quinteros MD, Last Rate: 100 mL/hr at 06/18/20 1501, 300 mg at 06/18/20 1501  •  diphenhydrAMINE (BENADRYL) injection 25 mg, 25 mg, Intravenous, Q4H PRN, Huy Lara MD  •  ertapenem (INVanz) 1 g/100 mL 0.9% NS VTB (mbp), 1 g, Intravenous, Q24H, Nilda Hilario, RPH, 1 g at 06/18/20 1724  •  fat emulsion (INTRALIPID,LIPOSYN) 20 % infusion emulsion 250 mL, 250 mL, Intravenous, Q24H (TPN), Huy Lara MD, Last Rate: 40 mL/hr at 06/18/20 1725, 250 mL at 06/18/20 1725  •  HYDROmorphone (DILAUDID) injection 0.25 mg, 0.25 mg, Intravenous, Once, Huy Lara MD  •  HYDROmorphone (DILAUDID) injection 1 mg, 1 mg, Intravenous, Q2H PRN **AND** naloxone (NARCAN) injection 0.1 mg, 0.1 mg, Intravenous, Q5 Min PRN, Huy Lara MD  •  hydroxychloroquine (PLAQUENIL) tablet 200 mg, 200 mg, Oral, Q24H, Huy Lara MD, 200 mg at 06/19/20 0928  •  LORazepam (ATIVAN) injection 0.5 mg, 0.5 mg, Intravenous, Q12H PRN, Huy Lara MD  •  Magnesium Sulfate 2 gram Bolus, followed by 8 gram infusion (total Mg dose 10 grams)- Mg less than or equal to 1mg/dL, 2 g, Intravenous, PRN **OR** Magnesium Sulfate 2 gram / 50mL Infusion (GIVE X 3 BAGS TO EQUAL 6GM TOTAL DOSE) - Mg 1.1 - 1.5 mg/dl, 2 g, Intravenous, PRN **OR** Magnesium  Sulfate 4 gram infusion- Mg 1.6-1.9 mg/dL, 4 g, Intravenous, PRN, Huy Lara MD, Last Rate: 25 mL/hr at 20 0136, 4 g at 20  •  Morphine sulfate (PF) injection 4 mg, 4 mg, Intravenous, Q2H PRN, 4 mg at 20 **AND** naloxone (NARCAN) injection 0.4 mg, 0.4 mg, Intravenous, Q5 Min PRN, Huy Lara MD  •  [] HYDROmorphone (DILAUDID) injection 0.25 mg, 0.25 mg, Intravenous, Q2H PRN **AND** naloxone (NARCAN) injection 0.4 mg, 0.4 mg, Intravenous, Q5 Min PRN, Huy Lara MD  •  [] ondansetron (ZOFRAN) injection 4 mg, 4 mg, Intravenous, Q6H PRN **FOLLOWED BY** ondansetron (ZOFRAN) tablet 4 mg, 4 mg, Oral, Q6H PRN, Huy Lara MD  •  pantoprazole (PROTONIX) injection 40 mg, 40 mg, Intravenous, Q AM, Huy Lara MD, 40 mg at 20  •  Pharmacy to Dose TPN, , Does not apply, Continuous PRN, Huy Lara MD  •  phenol (CHLORASEPTIC) 1.4 % liquid 2 spray, 2 spray, Mouth/Throat, Q2H PRN, Huy Lara MD  •  potassium chloride 10 mEq in 100 mL IVPB, 10 mEq, Intravenous, Q1H PRN, Huy Lara MD  •  potassium phosphate 45 mmol in sodium chloride 0.9 % 500 mL infusion, 45 mmol, Intravenous, PRN **OR** potassium phosphate 30 mmol in sodium chloride 0.9 % 250 mL infusion, 30 mmol, Intravenous, PRN, Last Rate: 31.3 mL/hr at 2041, 30 mmol at 20 **OR** potassium phosphate 15 mmol in sodium chloride 0.9 % 100 mL infusion, 15 mmol, Intravenous, PRN **OR** sodium phosphates 45 mmol in sodium chloride 0.9 % 250 mL IVPB, 45 mmol, Intravenous, PRN **OR** sodium phosphates 30 mmol in sodium chloride 0.9 % 250 mL IVPB, 30 mmol, Intravenous, PRN **OR** sodium phosphates 15 mmol in sodium chloride 0.9 % 250 mL IVPB, 15 mmol, Intravenous, PRN, Huy Lara MD  •  promethazine (PHENERGAN) injection 12.5 mg, 12.5 mg, Intravenous, Q6H PRN **OR** promethazine (PHENERGAN) injection 12.5 mg, 12.5 mg,  Intramuscular, Q6H PRN, Huy Lara MD  •  simethicone (MYLICON) chewable tablet 80 mg, 80 mg, Oral, 4x Daily PRN, Huy Lara MD  •  sodium chloride 0.9 % flush 10 mL, 10 mL, Intravenous, Q12H, Huy Lara MD, 10 mL at 06/19/20 0928  •  sodium chloride 0.9 % flush 10 mL, 10 mL, Intravenous, PRN, Huy Lara MD  •  sodium chloride 0.9 % flush 10 mL, 10 mL, Intravenous, Q12H, Ellen Mendez MD, 10 mL at 06/19/20 0928  •  sodium chloride 0.9 % flush 10 mL, 10 mL, Intravenous, PRN, Ellen Mendez MD    Please refer to the medical record for a full medication list    Review of Systems:    Constitutional-- No Fever, chills or sweats.  Appetite good, and no malaise. No fatigue.  HEENT-- No new vision, hearing or throat complaints.  No epistaxis or oral sores.  Denies odynophagia or dysphagia.  No odynophagia or dysphagia. No headache, photophobia or neck stiffness.  CV-- No chest pain, palpitation or syncope  Resp-- No SOB/cough/Hemoptysis  GI- No nausea, vomiting, or diarrhea.  No hematochezia, melena, or hematemesis. Denies jaundice or chronic liver disease.  Minimal abdominal pain.  -- No dysuria, hematuria, or flank pain.  Denies hesitancy, urgency or flank pain.  Lymph- no swollen lymph nodes in neck/axilla or groin.   Heme- No active bruising or bleeding; no Hx of DVT or PE.  MS-- no swelling or pain in the bones or joints of arms/legs.  No new back pain.  Neuro-- No acute focal weakness or numbness in the arms or legs.  No seizures.  Skin--No rashes or lesions, no nodules, less redness left lower leg.    Physical Exam:   Vital Signs   Temp:  [98.9 °F (37.2 °C)] 98.9 °F (37.2 °C)  Heart Rate:  [] 138  Resp:  [16-18] 18  BP: (164)/(80) 164/80    Temp  Min: 98.9 °F (37.2 °C)  Max: 98.9 °F (37.2 °C)  BP  Min: 164/80  Max: 164/80  Pulse  Min: 84  Max: 138  Resp  Min: 16  Max: 18  SpO2  Min: 84 %  Max: 96 %    Blood pressure 164/80, pulse (!) 138, temperature  "98.9 °F (37.2 °C), temperature source Oral, resp. rate 18, height 154.9 cm (61\"), weight 48.5 kg (107 lb), SpO2 (!) 87 %.  GENERAL: Awake and alert, in minor distress. Appears older than stated age.  HEENT:  Normocephalic, atraumatic.  Oropharynx without thrush. Dentition in good repair. No cervical adenopathy. No neck masses.  Ears externally normal, Nose externally normal.  EYES: No conjunctival injection. No icterus. EOM full.  LYMPHATICS: No lymphadenopathy of the neck or axillary or inguinal regions.   HEART: No murmur, gallop, or pericardial friction rub. Reg rate rhythm, No JVD at 45 degrees.  LUNGS: Scattered crackle left base. No respiratory distress, no use of accessory muscles.  ABDOMEN: Soft, min tenderness left quadrant, nondistended. No appreciable HSM.  Bowel sounds normal.  SKIN: Warm and dry without cutaneous eruptions.  No nodules.  Surg wounds ok.  Midline.  No significant erythema.  Left groin without significant erythema.  Left lower leg with some swelling from DVT.  No crepitus left groin or left leg.  PSYCHIATRIC: Mental status lucid. No confusion.  EXT:  No cellulitic change.  NEURO: Oriented to name, nonfocal.      Results Review:   I reviewed the patient's new clinical results.  I reviewed the patient's new imaging results and agree with the interpretation.  I reviewed the patient's other test results and agree with the interpretation    Results from last 7 days   Lab Units 06/19/20  0414 06/18/20  0623 06/17/20  0349   WBC 10*3/mm3 6.27 5.98 5.69   HEMOGLOBIN g/dL 9.8* 9.9* 9.4*   HEMATOCRIT % 29.9* 30.7* 29.4*   PLATELETS 10*3/mm3 146 148 95*     Results from last 7 days   Lab Units 06/19/20  0414   SODIUM mmol/L 139   POTASSIUM mmol/L 3.7   CHLORIDE mmol/L 101   CO2 mmol/L 27.0   BUN mg/dL 10   CREATININE mg/dL 0.46*   GLUCOSE mg/dL 126*   CALCIUM mg/dL 8.5*     Results from last 7 days   Lab Units 06/19/20  0414   ALK PHOS U/L 45   BILIRUBIN mg/dL 0.4   ALT (SGPT) U/L 28   AST (SGOT) " U/L 52*                 Results from last 7 days   Lab Units 06/18/20  1212   LACTATE mmol/L 1.6     Estimated Creatinine Clearance: 48.7 mL/min (A) (by C-G formula based on SCr of 0.46 mg/dL (L)).    Microbiology:  Microbiology Results Abnormal     Procedure Component Value - Date/Time    Blood Culture - Blood, Hand, Right [[id number]] Collected:  06/15/20 1711    Lab Status:  Preliminary result Specimen:  Blood from Hand, Right Updated:  06/18/20 1730     Blood Culture No growth at 3 days    Blood Culture - Blood, Arm, Left [[id number]] Collected:  06/15/20 1711    Lab Status:  Preliminary result Specimen:  Blood from Arm, Left Updated:  06/18/20 1730     Blood Culture No growth at 3 days    Respiratory Culture - Sputum, Cough [[id number]] Collected:  06/15/20 1837    Lab Status:  Final result Specimen:  Sputum from Cough Updated:  06/17/20 1305     Respiratory Culture Rare Normal Respiratory Candice     Gram Stain Many (4+) WBCs per low power field      Moderate (3+) Epithelial cells per low power field      No organisms seen    Urine Culture - Urine, Urine, Clean Catch [[id number]]  (Normal) Collected:  06/15/20 1422    Lab Status:  Final result Specimen:  Urine, Clean Catch Updated:  06/16/20 2242     Urine Culture No growth    COVID PRE-OP / PRE-PROCEDURE SCREENING ORDER (NO ISOLATION) - Swab, Nasopharynx [[id number]] Collected:  06/13/20 2334    Lab Status:  Final result Specimen:  Swab from Nasopharynx Updated:  06/14/20 0045    Narrative:       The following orders were created for panel order COVID PRE-OP / PRE-PROCEDURE SCREENING ORDER (NO ISOLATION) - Swab, Nasopharynx.  Procedure                               Abnormality         Status                     ---------                               -----------         ------                     COVID-19,CEPHEID,[facility] IN-...[[id number]]  Normal              Final result                 Please view results for these tests on the individual orders.     COVID-19,CEPHEID,[facility] IN-HOUSE(OR EMERGENT/ADD-ON),NP SWAB IN TRANSPORT MEDIA 3-4 HR TAT - Swab, Nasopharynx [[id number]]  (Normal) Collected:  06/13/20 2334    Lab Status:  Final result Specimen:  Swab from Nasopharynx Updated:  06/14/20 0045     COVID19 Not Detected          Radiology:  Imaging Results (Last 72 Hours)     Procedure Component Value Units Date/Time    XR Chest 1 View [[id number]] Collected:  06/19/20 0949     Updated:  06/19/20 1007    Narrative:       EXAMINATION: XR CHEST 1 VW-06/19/2020:     INDICATION: Tachycardia, evaluate pic line tip; K40.90-Unilateral  inguinal hernia, without obstruction or gangrene, not specified as  recurrent.     COMPARISON: 06/15/2020.     FINDINGS: Right upper study PICC line is seen with its tip in the distal  SVC. The heart is normal in size. The vasculature appears at upper  limits of normal but improved from the prior study. There is mild  remaining bibasilar discoid atelectasis and trace effusion. No new  pulmonary parenchymal disease is seen.       Impression:       1. PICC line tip in good position at the cavoatrial junction.  2. Mild remaining bibasilar discoid atelectasis and effusion.     D:  06/19/2020  E:  06/19/2020              XR Abdomen KUB [[id number]] Collected:  06/17/20 0820     Updated:  06/17/20 1709    Narrative:       EXAMINATION: XR ABDOMEN KUB-      INDICATION: Followup ileus; K40.90-Unilateral inguinal hernia, without  obstruction or gangrene, not specified as recurrent.      COMPARISON: None.     FINDINGS: Supine views of the abdomen demonstrate nonobstructive bowel  gas pattern with gaseous distended proximal colonic segments including  the ascending and transverse colon with intermixed areas of residual  contrast from recent oral administration in the proximal ascending colon  without gross intraperitoneal free air.       Impression:       Nonobstructive bowel gas pattern with gaseous distended  proximal colonic segments and residual oral  contrast within the  ascending colon favoring ileus pattern.     D:  06/17/2020  E:  06/17/2020     This report was finalized on 6/17/2020 5:06 PM by Dr. Derick Escobar.       XR Chest 1 View [[id number]] Collected:  06/15/20 1711     Updated:  06/16/20 2242    Narrative:       EXAMINATION: XR CHEST 1 VW-06/15/2020:     INDICATION: Sepsis; K40.90-Unilateral inguinal hernia, without  obstruction or gangrene, not specified as recurrent.     COMPARISON: 01/07/2018.     FINDINGS: The heart shadow is borderline enlarged. There is increased  diffuse pulmonary interstitial disease that may reflect interstitial  edema or changes of ARDS. There is very mild bibasilar discoid  atelectasis. No other focal lung disease is seen. Biapical pleural  thickening, right greater than left, is smooth-margined and stable.       Impression:       1.  Interval development of diffuse interstitial disease, resembling  early changes of interstitial edema, possibly ARDS. By history, chest CT  scan has been ordered for later today.  2.  Mild bibasilar discoid atelectasis.      D:  06/15/2020  E:  06/16/2020     This report was finalized on 6/16/2020 10:39 PM by Dr. Jamal Harley MD.       CT Abdomen Pelvis With Contrast [[id number]] Collected:  06/16/20 0817     Updated:  06/16/20 1446    Narrative:       EXAMINATION: CT CHEST W CONTRAST-, CT ABDOMEN AND PELVIS W CONTRAST-      INDICATION: Postop bandemia, procalcitonin elevation, smoker, SLE;  K40.90-Unilateral inguinal hernia, without obstruction or gangrene, not  specified as recurrent.      TECHNIQUE: CT chest, abdomen and pelvis with intravenous contrast.     The radiation dose reduction device was turned on for each scan per the  ALARA (As Low as Reasonably Achievable) protocol.     COMPARISON: Chest x-ray dated 06/15/2020.     FINDINGS: CHEST: Thyroid is homogeneous in attenuation. No bulky  mediastinal adenopathy. Central airways are patent. Esophagus in normal  course and caliber. Patent  nonaneurysmal thoracic aorta with patent  great vessel origins. Central pulmonary arteries are grossly patent.  Cardiac size borderline enlarged without pericardial effusion. Extended  lung windows demonstrate opacifications at the lung bases right greater  than left with air bronchograms present and trace volume right greater  than left bilateral pleural effusions. Considerations for atelectasis  versus airspace disease intermixed with healed granulomatous  involvement. Degenerative changes of the thoracic spine without  aggressive osseous lesion. No soft tissue body wall findings of concern.     ABDOMEN AND PELVIS: Liver demonstrates mild diffuse low-attenuation of  hepatic steatosis with several subcentimeter areas of likely cystic  lesions too small to characterize. Gallbladder is folded in  configuration without cholelithiasis clearly evident. No biliary  dilatation. Pancreas and spleen unremarkable. Adrenals without distinct  nodule. Kidneys without hydronephrosis or hydroureter. No bulky  retroperitoneal adenopathy. Atherosclerotic abdominal aorta measuring up  to 3.7 cm aneurysmal in the infrarenal portion. Portal veins and IVC  patent. GI tract evaluation demonstrates moderate distended gastric  lumen with air-fluid level. Dilated fluid-filled bowel in the left  midabdomen extending to the lower midabdomen there is an apparent  transition point at the noted anastomosis. Limited evaluation for  extraluminal contact given obstructive bowel gas pattern limiting flow  of intraluminal contrast, however, there is postsurgical change of the  intraabdominal wall and extraluminal fluid in the left lower quadrant  anteriorly with mixed gas locules, however no definitive abscess is  clearly evident despite trace volume ascites. Pelvic viscera otherwise  unremarkable. Degenerative changes of the spine without aggressive  osseous lesion. Asymmetric edema within the left proximal thigh, may  represent postsurgical  change, however, underlying deep vein thrombosis  could have a similar appearance.       Impression:       1. Dilated fluid-filled small bowel within the left midabdomen extending  to the lower midabdomen where there is anastomosis in the right lower  midabdomen concerning for transition point and obstructive or at least  partially obstructive gas pattern with postsurgical changes including  extraluminal gas and fluid which should be correlated with surgical  history to evaluate for appropriate time point, however no definitive  peripherally enhancing focal fluid collection to suggest abscess,  however, likely premature for development of definitive abscess given  postsurgical changes. Limited evaluation of intraluminal contrast extent  of involvement due to obstructive bowel gas pattern.  2. Opacifications of the lung bases right greater than left of moderate  to severe atelectatic changes of subsegmental involvement with trace  bilateral pleural effusions.  3. Asymmetric edema within the lower left abdomen and left thigh body  wall tissues may represent postsurgical change given left inguinal soft  tissue gas, however, underlying deep vein thrombosis could have a  similar appearance and may be further evaluated with duplex.     D:  06/16/2020  E:  06/16/2020     This report was finalized on 6/16/2020 2:43 PM by Dr. Derick Escobar.       CT Chest With Contrast [[id number]] Collected:  06/16/20 0817     Updated:  06/16/20 1446    Narrative:       EXAMINATION: CT CHEST W CONTRAST-, CT ABDOMEN AND PELVIS W CONTRAST-      INDICATION: Postop bandemia, procalcitonin elevation, smoker, SLE;  K40.90-Unilateral inguinal hernia, without obstruction or gangrene, not  specified as recurrent.      TECHNIQUE: CT chest, abdomen and pelvis with intravenous contrast.     The radiation dose reduction device was turned on for each scan per the  ALARA (As Low as Reasonably Achievable) protocol.     COMPARISON: Chest x-ray dated  06/15/2020.     FINDINGS: CHEST: Thyroid is homogeneous in attenuation. No bulky  mediastinal adenopathy. Central airways are patent. Esophagus in normal  course and caliber. Patent nonaneurysmal thoracic aorta with patent  great vessel origins. Central pulmonary arteries are grossly patent.  Cardiac size borderline enlarged without pericardial effusion. Extended  lung windows demonstrate opacifications at the lung bases right greater  than left with air bronchograms present and trace volume right greater  than left bilateral pleural effusions. Considerations for atelectasis  versus airspace disease intermixed with healed granulomatous  involvement. Degenerative changes of the thoracic spine without  aggressive osseous lesion. No soft tissue body wall findings of concern.     ABDOMEN AND PELVIS: Liver demonstrates mild diffuse low-attenuation of  hepatic steatosis with several subcentimeter areas of likely cystic  lesions too small to characterize. Gallbladder is folded in  configuration without cholelithiasis clearly evident. No biliary  dilatation. Pancreas and spleen unremarkable. Adrenals without distinct  nodule. Kidneys without hydronephrosis or hydroureter. No bulky  retroperitoneal adenopathy. Atherosclerotic abdominal aorta measuring up  to 3.7 cm aneurysmal in the infrarenal portion. Portal veins and IVC  patent. GI tract evaluation demonstrates moderate distended gastric  lumen with air-fluid level. Dilated fluid-filled bowel in the left  midabdomen extending to the lower midabdomen there is an apparent  transition point at the noted anastomosis. Limited evaluation for  extraluminal contact given obstructive bowel gas pattern limiting flow  of intraluminal contrast, however, there is postsurgical change of the  intraabdominal wall and extraluminal fluid in the left lower quadrant  anteriorly with mixed gas locules, however no definitive abscess is  clearly evident despite trace volume ascites. Pelvic  viscera otherwise  unremarkable. Degenerative changes of the spine without aggressive  osseous lesion. Asymmetric edema within the left proximal thigh, may  represent postsurgical change, however, underlying deep vein thrombosis  could have a similar appearance.       Impression:       1. Dilated fluid-filled small bowel within the left midabdomen extending  to the lower midabdomen where there is anastomosis in the right lower  midabdomen concerning for transition point and obstructive or at least  partially obstructive gas pattern with postsurgical changes including  extraluminal gas and fluid which should be correlated with surgical  history to evaluate for appropriate time point, however no definitive  peripherally enhancing focal fluid collection to suggest abscess,  however, likely premature for development of definitive abscess given  postsurgical changes. Limited evaluation of intraluminal contrast extent  of involvement due to obstructive bowel gas pattern.  2. Opacifications of the lung bases right greater than left of moderate  to severe atelectatic changes of subsegmental involvement with trace  bilateral pleural effusions.  3. Asymmetric edema within the lower left abdomen and left thigh body  wall tissues may represent postsurgical change given left inguinal soft  tissue gas, however, underlying deep vein thrombosis could have a  similar appearance and may be further evaluated with duplex.     D:  06/16/2020  E:  06/16/2020     This report was finalized on 6/16/2020 2:43 PM by Dr. Derick Escobar.             IMPRESSION:     1.  Incarcerated small bowel with necrosis, with increased risk for intraabdominal infection with strep, gnr, anaerobes.  Blood cultures negative.  Improved.  2.  Repair with mesh, 6/14/20, of #1.  Seems to be recovering.  3.  Encephalopathy, toxic and metabolic related to above.  Resolved.  4.  Acute kidney injury on admit w/ Cr 1.3, resolved.  Resolved.  5.  Anemia, chronic disease,  and acute post op.  Status post transfusion.  Improved.  6.  Thrombocytopenia, related to above.  Resolved.  Could be related to medications.  7.  Hypocalcemia, 8.5.  8.  Left groin cellulitis on admit.  Resolved.  9.  Potential infected groin/RP hematoma.  On treatment.  No positive cultures.  10.  Acute hypoxic respiratory failure.  Improved.  11.  Hyponatremia resolved.  12.  Hypoalbuminemia, 2.8, ongoing.  Mild malnutrition.  13.  Hypophosphatemia 1.5, improved.  14.  DVT left leg, new.  Common femoral left noted on ultrasound 6/16.  15.  Hepatic steatosis.  CT scan 6/16.  16.  Lactic acidosis, new.  Not overtly toxic.  Normal WBC count.  Possibly from intra-abdominal or impending infection wounds.    Moving forward on current regimen.    Plan:    1.  Diagnostically, follow exam, cbc, cmp, crp, follow 6/15/2020 blood cx x 2, urine eval and cx, procalcitonin level and radiographic studies.  Lactic acid.  2.  Therapeutically, changed to ertapenem (on 6/17) 1 g IV daily till 6/28/20.  Discontinued Zosyn.  Cultures negative to date.  Added daptomycin 350 mg IV daily (6/18) given the lactic acidosis.  3.  Wound care.  4.  O2 support as needed.    I discussed the patients findings and my recommendations with patient, family and nursing staff    Our group would be pleased to follow this patient over the course of their hospitalization and assist with outpatient antimicrobial therapy, as indicated.  Further recommendations depend on the results of the cultures and clinical course.    Case management orders: Please arrange for outpatient IV antibiotics with ertapenem 1 g IV daily and daptomycin 500 mg IV daily until 6/28/2020.  Check CBC, CMP, CRP, CPK weekly while on IV antibiotics.  Fax orders to [phone], call [phone] with final arrangements.  Arrange for follow-up with me in 1 week post discharge.    Jose De Jesus Quinteros MD  6/19/2020

## 2020-06-19 NOTE — PROGRESS NOTES
Case Management Discharge Note      Final Note:  Plan is home with family and daily outpatient IV antibiotics arranged at [facility]([address], [facility]). Pt will also have weekly labs and PICC care.  [facility] aware labs to be faxed to [facility]'s office.  Pt is aware she has an appointment with Dr Garcia on 6/24 at 345pm and will recieve her dose of IV antibiotic for that day in the office.  Pt denies further discharge needs.  She reports she has family that will provide transportation.                          Destination      No service has been selected for the patient.      Durable Medical Equipment      No service has been selected for the patient.      Dialysis/Infusion      No service has been selected for the patient.      Home Medical Care      No service has been selected for the patient.      Therapy      No service has been selected for the patient.      Community Resources      No service has been selected for the patient.             Final Discharge Disposition Code: 01 - home or self-care

## 2020-06-19 NOTE — PROGRESS NOTES
Continued Stay Note  [facility]     Patient Name: oR Walker  MRN: [id number]  Today's Date: 6/19/2020    Admit Date: 6/13/2020    Discharge Plan     Row Name 06/19/20 1257       Plan    Plan  dishcarge plan    Final Discharge Disposition Code  01 - home or self-care    Final Note  Plan is home with family and daily outpatient IV antibiotics arranged at [facility]([address], [facility] [place]). Pt will also have weekly labs and PICC care.  [facility] aware labs to be faxed to [facility]'s office.  Pt is aware she has an appointment with Dr Garcia on 6/24 at 345pm and will recieve her dose of IV antibiotic for that day in the office.  Pt denies further discharge needs.  She reports she has family that will provide transportation.                                     Row Name 06/19/20 1255       Plan    Plan      Patient/Family in Agreement with Plan  yes    Final Discharge Disposition Code  01 - home or self-care    Final Note     Row Name 06/19/20 1254       Plan    Plan  discharge plan        Discharge Codes    No documentation.       Expected Discharge Date and Time     Expected Discharge Date Expected Discharge Time    Jun 19, 2020             Sapphire Maria RN

## 2020-06-19 NOTE — PROGRESS NOTES
Nutrition Services    Patient Name:  Ro Walker  YOB: 1950  MRN: [id number]  Admit Date:  6/13/2020    Patient reports eating and tolerating small amount of solid foods yesterday at lunch/dinner and this morning at breakfast (pancakes and eggs). Reports she has been having bowel movements since yesterday afternoon and may get to go home today.    RD recommends discontinuing PPN as patient's bowel function is returning. RD verbally requested that RN decrease PPN rate from 100 ml/hr to 50 ml/hr to wean and allow bag to run out.    Electronically signed by:  Shanae Christy RD  06/19/20 10:49

## 2020-06-20 ENCOUNTER — READMISSION MANAGEMENT (OUTPATIENT)
Dept: CALL CENTER | Facility: HOSPITAL | Age: 70
End: 2020-06-20

## 2020-06-20 LAB
BACTERIA SPEC AEROBE CULT: NORMAL
BACTERIA SPEC AEROBE CULT: NORMAL

## 2020-06-20 NOTE — OUTREACH NOTE
Prep Survey      Responses   [facility] facility patient discharged from?  [place]   Is LACE score < 7 ?  No   Eligibility  Readm Mgmt   Discharge diagnosis  Incarcerated inguinal hernia s/p open small bowel resection and left peritoneal inguinal hernia repair,  LLE DVT   COVID-19 Test Status  Negative   Does the patient have one of the following disease processes/diagnoses(primary or secondary)?  General Surgery   Does the patient have Home health ordered?  -- [[facility] for IV ABX]   Is there a DME ordered?  No   Comments regarding appointments  Dr Quinteros 06/24/20 w/ Infusion in office this day   Prep survey completed?  Yes          Shana Ramos RN

## 2020-06-21 ENCOUNTER — HOSPITAL ENCOUNTER (INPATIENT)
Facility: HOSPITAL | Age: 70
LOS: 4 days | Discharge: HOME | End: 2020-06-25
Attending: INTERNAL MEDICINE | Admitting: INTERNAL MEDICINE

## 2020-06-21 ENCOUNTER — READMISSION MANAGEMENT (OUTPATIENT)
Dept: CALL CENTER | Facility: HOSPITAL | Age: 70
End: 2020-06-21

## 2020-06-21 DIAGNOSIS — K92.1 GASTROINTESTINAL HEMORRHAGE WITH MELENA: Primary | ICD-10-CM

## 2020-06-21 DIAGNOSIS — K92.2 GI BLEED: ICD-10-CM

## 2020-06-21 PROBLEM — I82.409 ACUTE DVT (DEEP VENOUS THROMBOSIS) (HCC): Status: ACTIVE | Noted: 2020-06-21

## 2020-06-21 PROBLEM — J18.9 PNEUMONIA OF BOTH LOWER LOBES DUE TO INFECTIOUS ORGANISM: Status: ACTIVE | Noted: 2020-06-21

## 2020-06-21 LAB
APTT PPP: 33.7 SECONDS (ref 50–95)
HOLD SPECIMEN: NORMAL
HOLD SPECIMEN: NORMAL
INR PPP: 1.35 (ref 0.85–1.16)
PROTHROMBIN TIME: 16.4 SECONDS (ref 11.5–14)
RETICS # AUTO: 0.06 10*6/MM3 (ref 0.02–0.13)
RETICS/RBC NFR AUTO: 2.43 % (ref 0.7–1.9)
WHOLE BLOOD HOLD SPECIMEN: NORMAL
WHOLE BLOOD HOLD SPECIMEN: NORMAL

## 2020-06-21 PROCEDURE — 83540 ASSAY OF IRON: CPT | Performed by: INTERNAL MEDICINE

## 2020-06-21 PROCEDURE — 80053 COMPREHEN METABOLIC PANEL: CPT | Performed by: INTERNAL MEDICINE

## 2020-06-21 PROCEDURE — 84466 ASSAY OF TRANSFERRIN: CPT | Performed by: INTERNAL MEDICINE

## 2020-06-21 PROCEDURE — 82607 VITAMIN B-12: CPT | Performed by: INTERNAL MEDICINE

## 2020-06-21 PROCEDURE — 84145 PROCALCITONIN (PCT): CPT | Performed by: INTERNAL MEDICINE

## 2020-06-21 PROCEDURE — 93010 ELECTROCARDIOGRAM REPORT: CPT | Performed by: INTERNAL MEDICINE

## 2020-06-21 PROCEDURE — 82728 ASSAY OF FERRITIN: CPT | Performed by: INTERNAL MEDICINE

## 2020-06-21 PROCEDURE — 85610 PROTHROMBIN TIME: CPT | Performed by: PHYSICIAN ASSISTANT

## 2020-06-21 PROCEDURE — 93005 ELECTROCARDIOGRAM TRACING: CPT | Performed by: INTERNAL MEDICINE

## 2020-06-21 PROCEDURE — 85007 BL SMEAR W/DIFF WBC COUNT: CPT | Performed by: PHYSICIAN ASSISTANT

## 2020-06-21 PROCEDURE — 83735 ASSAY OF MAGNESIUM: CPT | Performed by: INTERNAL MEDICINE

## 2020-06-21 PROCEDURE — 85730 THROMBOPLASTIN TIME PARTIAL: CPT | Performed by: PHYSICIAN ASSISTANT

## 2020-06-21 PROCEDURE — 85045 AUTOMATED RETICULOCYTE COUNT: CPT | Performed by: INTERNAL MEDICINE

## 2020-06-21 PROCEDURE — 82746 ASSAY OF FOLIC ACID SERUM: CPT | Performed by: INTERNAL MEDICINE

## 2020-06-21 PROCEDURE — 99223 1ST HOSP IP/OBS HIGH 75: CPT | Performed by: INTERNAL MEDICINE

## 2020-06-21 PROCEDURE — 85025 COMPLETE CBC W/AUTO DIFF WBC: CPT | Performed by: PHYSICIAN ASSISTANT

## 2020-06-21 PROCEDURE — 83615 LACTATE (LD) (LDH) ENZYME: CPT | Performed by: INTERNAL MEDICINE

## 2020-06-21 RX ORDER — SODIUM CHLORIDE 0.9 % (FLUSH) 0.9 %
10 SYRINGE (ML) INJECTION AS NEEDED
Status: DISCONTINUED | OUTPATIENT
Start: 2020-06-21 | End: 2020-06-25 | Stop reason: HOSPADM

## 2020-06-21 RX ORDER — SODIUM CHLORIDE 9 MG/ML
100 INJECTION, SOLUTION INTRAVENOUS CONTINUOUS
Status: ACTIVE | OUTPATIENT
Start: 2020-06-22 | End: 2020-06-22

## 2020-06-21 RX ORDER — HEPARIN SODIUM 10000 [USP'U]/100ML
18 INJECTION, SOLUTION INTRAVENOUS
Status: DISCONTINUED | OUTPATIENT
Start: 2020-06-22 | End: 2020-06-22

## 2020-06-21 RX ORDER — HEPARIN SODIUM 1000 [USP'U]/ML
80 INJECTION, SOLUTION INTRAVENOUS; SUBCUTANEOUS AS NEEDED
Status: DISCONTINUED | OUTPATIENT
Start: 2020-06-21 | End: 2020-06-21

## 2020-06-21 RX ORDER — ACETAMINOPHEN 325 MG/1
650 TABLET ORAL EVERY 4 HOURS PRN
Status: DISCONTINUED | OUTPATIENT
Start: 2020-06-21 | End: 2020-06-25 | Stop reason: HOSPADM

## 2020-06-21 RX ORDER — CHOLECALCIFEROL (VITAMIN D3) 125 MCG
5 CAPSULE ORAL NIGHTLY PRN
Status: DISCONTINUED | OUTPATIENT
Start: 2020-06-21 | End: 2020-06-25 | Stop reason: HOSPADM

## 2020-06-21 RX ORDER — PANTOPRAZOLE SODIUM 40 MG/10ML
40 INJECTION, POWDER, LYOPHILIZED, FOR SOLUTION INTRAVENOUS
Status: DISCONTINUED | OUTPATIENT
Start: 2020-06-22 | End: 2020-06-22

## 2020-06-21 RX ORDER — HEPARIN SODIUM 1000 [USP'U]/ML
40 INJECTION, SOLUTION INTRAVENOUS; SUBCUTANEOUS AS NEEDED
Status: DISCONTINUED | OUTPATIENT
Start: 2020-06-21 | End: 2020-06-21

## 2020-06-21 RX ORDER — SODIUM CHLORIDE 0.9 % (FLUSH) 0.9 %
10 SYRINGE (ML) INJECTION EVERY 12 HOURS SCHEDULED
Status: DISCONTINUED | OUTPATIENT
Start: 2020-06-22 | End: 2020-06-25 | Stop reason: HOSPADM

## 2020-06-21 RX ORDER — ONDANSETRON 2 MG/ML
4 INJECTION INTRAMUSCULAR; INTRAVENOUS EVERY 6 HOURS PRN
Status: DISCONTINUED | OUTPATIENT
Start: 2020-06-21 | End: 2020-06-25 | Stop reason: HOSPADM

## 2020-06-21 RX ADMIN — SODIUM CHLORIDE, PRESERVATIVE FREE 10 ML: 5 INJECTION INTRAVENOUS at 23:50

## 2020-06-22 ENCOUNTER — APPOINTMENT (OUTPATIENT)
Dept: OTHER | Facility: HOSPITAL | Age: 70
End: 2020-06-22

## 2020-06-22 ENCOUNTER — ANESTHESIA EVENT (OUTPATIENT)
Dept: GASTROENTEROLOGY | Facility: HOSPITAL | Age: 70
End: 2020-06-22

## 2020-06-22 ENCOUNTER — ANESTHESIA (OUTPATIENT)
Dept: GASTROENTEROLOGY | Facility: HOSPITAL | Age: 70
End: 2020-06-22

## 2020-06-22 PROBLEM — T81.43XA POSTPROCEDURAL INTRAABDOMINAL ABSCESS: Status: ACTIVE | Noted: 2020-06-22

## 2020-06-22 PROBLEM — K65.1 POSTPROCEDURAL INTRAABDOMINAL ABSCESS: Status: ACTIVE | Noted: 2020-06-22

## 2020-06-22 PROBLEM — K92.1 GASTROINTESTINAL HEMORRHAGE WITH MELENA: Status: ACTIVE | Noted: 2020-06-21

## 2020-06-22 LAB
ABO GROUP BLD: NORMAL
ALBUMIN SERPL-MCNC: 2.7 G/DL (ref 3.5–5.2)
ALBUMIN/GLOB SERPL: 0.9 G/DL
ALP SERPL-CCNC: 48 U/L (ref 39–117)
ALT SERPL W P-5'-P-CCNC: 32 U/L (ref 1–33)
ANION GAP SERPL CALCULATED.3IONS-SCNC: 10 MMOL/L (ref 5–15)
AST SERPL-CCNC: 30 U/L (ref 1–32)
B PARAPERT DNA SPEC QL NAA+PROBE: NOT DETECTED
B PERT DNA SPEC QL NAA+PROBE: NOT DETECTED
BASOPHILS # BLD MANUAL: 0 10*3/MM3 (ref 0–0.2)
BASOPHILS NFR BLD AUTO: 0 % (ref 0–1.5)
BILIRUB SERPL-MCNC: 0.3 MG/DL (ref 0.2–1.2)
BLD GP AB SCN SERPL QL: NEGATIVE
BUN BLD-MCNC: 17 MG/DL (ref 8–23)
BUN/CREAT SERPL: 28.3 (ref 7–25)
C PNEUM DNA NPH QL NAA+NON-PROBE: NOT DETECTED
CALCIUM SPEC-SCNC: 7.8 MG/DL (ref 8.6–10.5)
CHLORIDE SERPL-SCNC: 106 MMOL/L (ref 98–107)
CO2 SERPL-SCNC: 24 MMOL/L (ref 22–29)
CREAT BLD-MCNC: 0.6 MG/DL (ref 0.57–1)
D-LACTATE SERPL-SCNC: 0.8 MMOL/L (ref 0.5–2)
DEPRECATED RDW RBC AUTO: 46.5 FL (ref 37–54)
EOSINOPHIL # BLD MANUAL: 0.15 10*3/MM3 (ref 0–0.4)
EOSINOPHIL NFR BLD MANUAL: 2 % (ref 0.3–6.2)
ERYTHROCYTE [DISTWIDTH] IN BLOOD BY AUTOMATED COUNT: 13.5 % (ref 12.3–15.4)
FERRITIN SERPL-MCNC: 616.4 NG/ML (ref 13–150)
FLUAV H1 2009 PAND RNA NPH QL NAA+PROBE: NOT DETECTED
FLUAV H1 HA GENE NPH QL NAA+PROBE: NOT DETECTED
FLUAV H3 RNA NPH QL NAA+PROBE: NOT DETECTED
FLUAV SUBTYP SPEC NAA+PROBE: NOT DETECTED
FLUBV RNA ISLT QL NAA+PROBE: NOT DETECTED
FOLATE SERPL-MCNC: 10.4 NG/ML (ref 4.78–24.2)
GFR SERPL CREATININE-BSD FRML MDRD: 98 ML/MIN/1.73
GLOBULIN UR ELPH-MCNC: 2.9 GM/DL
GLUCOSE BLD-MCNC: 90 MG/DL (ref 65–99)
HADV DNA SPEC NAA+PROBE: NOT DETECTED
HCOV 229E RNA SPEC QL NAA+PROBE: NOT DETECTED
HCOV HKU1 RNA SPEC QL NAA+PROBE: NOT DETECTED
HCOV NL63 RNA SPEC QL NAA+PROBE: NOT DETECTED
HCOV OC43 RNA SPEC QL NAA+PROBE: NOT DETECTED
HCT VFR BLD AUTO: 21.7 % (ref 34–46.6)
HCT VFR BLD AUTO: 23 % (ref 34–46.6)
HCT VFR BLD AUTO: 24 % (ref 34–46.6)
HCT VFR BLD AUTO: 33.2 % (ref 34–46.6)
HCT VFR BLD AUTO: 36.8 % (ref 34–46.6)
HGB BLD-MCNC: 10.8 G/DL (ref 12–15.9)
HGB BLD-MCNC: 12.1 G/DL (ref 12–15.9)
HGB BLD-MCNC: 6.8 G/DL (ref 12–15.9)
HGB BLD-MCNC: 7.3 G/DL (ref 12–15.9)
HGB BLD-MCNC: 7.3 G/DL (ref 12–15.9)
HMPV RNA NPH QL NAA+NON-PROBE: NOT DETECTED
HPIV1 RNA SPEC QL NAA+PROBE: NOT DETECTED
HPIV2 RNA SPEC QL NAA+PROBE: NOT DETECTED
HPIV3 RNA NPH QL NAA+PROBE: NOT DETECTED
HPIV4 P GENE NPH QL NAA+PROBE: NOT DETECTED
IRON 24H UR-MRATE: 17 MCG/DL (ref 37–145)
IRON SATN MFR SERPL: 10 % (ref 20–50)
LDH SERPL-CCNC: 243 U/L (ref 135–214)
LYMPHOCYTES # BLD MANUAL: 1.62 10*3/MM3 (ref 0.7–3.1)
LYMPHOCYTES NFR BLD MANUAL: 22 % (ref 19.6–45.3)
LYMPHOCYTES NFR BLD MANUAL: 7 % (ref 5–12)
M PNEUMO IGG SER IA-ACNC: NOT DETECTED
MAGNESIUM SERPL-MCNC: 1.9 MG/DL (ref 1.6–2.4)
MCH RBC QN AUTO: 29.8 PG (ref 26.6–33)
MCHC RBC AUTO-ENTMCNC: 31.7 G/DL (ref 31.5–35.7)
MCV RBC AUTO: 93.9 FL (ref 79–97)
MONOCYTES # BLD AUTO: 0.52 10*3/MM3 (ref 0.1–0.9)
NEUTROPHILS # BLD AUTO: 4.8 10*3/MM3 (ref 1.7–7)
NEUTROPHILS NFR BLD MANUAL: 65 % (ref 42.7–76)
PLAT MORPH BLD: NORMAL
PLATELET # BLD AUTO: 190 10*3/MM3 (ref 140–450)
PMV BLD AUTO: 11.5 FL (ref 6–12)
POTASSIUM BLD-SCNC: 3.7 MMOL/L (ref 3.5–5.2)
PROCALCITONIN SERPL-MCNC: 0.14 NG/ML (ref 0.1–0.25)
PROT SERPL-MCNC: 5.6 G/DL (ref 6–8.5)
RBC # BLD AUTO: 2.45 10*6/MM3 (ref 3.77–5.28)
RBC MORPH BLD: NORMAL
RH BLD: POSITIVE
RHINOVIRUS RNA SPEC NAA+PROBE: NOT DETECTED
RSV RNA NPH QL NAA+NON-PROBE: NOT DETECTED
SARS-COV-2 RNA RESP QL NAA+PROBE: NOT DETECTED
SCAN SLIDE: NORMAL
SODIUM BLD-SCNC: 140 MMOL/L (ref 136–145)
T&S EXPIRATION DATE: NORMAL
TIBC SERPL-MCNC: 164 MCG/DL (ref 298–536)
TRANSFERRIN SERPL-MCNC: 110 MG/DL (ref 200–360)
UFH PPP CHRO-ACNC: >1.1 IU/ML (ref 0.3–0.7)
UFH PPP CHRO-ACNC: >1.1 IU/ML (ref 0.3–0.7)
VARIANT LYMPHS NFR BLD MANUAL: 4 % (ref 0–5)
VIT B12 BLD-MCNC: 873 PG/ML (ref 211–946)
WBC MORPH BLD: NORMAL
WBC NRBC COR # BLD: 7.38 10*3/MM3 (ref 3.4–10.8)

## 2020-06-22 PROCEDURE — 88305 TISSUE EXAM BY PATHOLOGIST: CPT | Performed by: INTERNAL MEDICINE

## 2020-06-22 PROCEDURE — 86923 COMPATIBILITY TEST ELECTRIC: CPT

## 2020-06-22 PROCEDURE — 87635 SARS-COV-2 COVID-19 AMP PRB: CPT | Performed by: INTERNAL MEDICINE

## 2020-06-22 PROCEDURE — 85018 HEMOGLOBIN: CPT | Performed by: INTERNAL MEDICINE

## 2020-06-22 PROCEDURE — 85014 HEMATOCRIT: CPT | Performed by: INTERNAL MEDICINE

## 2020-06-22 PROCEDURE — 0DD58ZX EXTRACTION OF ESOPHAGUS, VIA NATURAL OR ARTIFICIAL OPENING ENDOSCOPIC, DIAGNOSTIC: ICD-10-PCS | Performed by: INTERNAL MEDICINE

## 2020-06-22 PROCEDURE — 86901 BLOOD TYPING SEROLOGIC RH(D): CPT | Performed by: INTERNAL MEDICINE

## 2020-06-22 PROCEDURE — 0100U HC BIOFIRE FILMARRAY RESP PANEL 2: CPT | Performed by: PHYSICIAN ASSISTANT

## 2020-06-22 PROCEDURE — 86850 RBC ANTIBODY SCREEN: CPT | Performed by: INTERNAL MEDICINE

## 2020-06-22 PROCEDURE — 83605 ASSAY OF LACTIC ACID: CPT | Performed by: INTERNAL MEDICINE

## 2020-06-22 PROCEDURE — 85520 HEPARIN ASSAY: CPT | Performed by: PHYSICIAN ASSISTANT

## 2020-06-22 PROCEDURE — 85520 HEPARIN ASSAY: CPT

## 2020-06-22 PROCEDURE — 99221 1ST HOSP IP/OBS SF/LOW 40: CPT | Performed by: PHYSICIAN ASSISTANT

## 2020-06-22 PROCEDURE — 99406 BEHAV CHNG SMOKING 3-10 MIN: CPT | Performed by: PHYSICIAN ASSISTANT

## 2020-06-22 PROCEDURE — 86900 BLOOD TYPING SEROLOGIC ABO: CPT

## 2020-06-22 PROCEDURE — 25010000002 PROPOFOL 10 MG/ML EMULSION: Performed by: NURSE ANESTHETIST, CERTIFIED REGISTERED

## 2020-06-22 PROCEDURE — 25010000002 VANCOMYCIN 10 G RECONSTITUTED SOLUTION

## 2020-06-22 PROCEDURE — 99232 SBSQ HOSP IP/OBS MODERATE 35: CPT | Performed by: HOSPITALIST

## 2020-06-22 PROCEDURE — P9016 RBC LEUKOCYTES REDUCED: HCPCS

## 2020-06-22 PROCEDURE — 36430 TRANSFUSION BLD/BLD COMPNT: CPT

## 2020-06-22 PROCEDURE — 86900 BLOOD TYPING SEROLOGIC ABO: CPT | Performed by: INTERNAL MEDICINE

## 2020-06-22 PROCEDURE — 87040 BLOOD CULTURE FOR BACTERIA: CPT | Performed by: INTERNAL MEDICINE

## 2020-06-22 PROCEDURE — 25010000002 MEROPENEM PER 100 MG: Performed by: INTERNAL MEDICINE

## 2020-06-22 RX ORDER — PANTOPRAZOLE SODIUM 40 MG/10ML
40 INJECTION, POWDER, LYOPHILIZED, FOR SOLUTION INTRAVENOUS EVERY 12 HOURS SCHEDULED
Status: DISCONTINUED | OUTPATIENT
Start: 2020-06-22 | End: 2020-06-24

## 2020-06-22 RX ORDER — PEG-3350, SODIUM SULFATE, SODIUM CHLORIDE, POTASSIUM CHLORIDE, SODIUM ASCORBATE AND ASCORBIC ACID 7.5-2.691G
1000 KIT ORAL
Status: COMPLETED | OUTPATIENT
Start: 2020-06-23 | End: 2020-06-23

## 2020-06-22 RX ORDER — PEG-3350, SODIUM SULFATE, SODIUM CHLORIDE, POTASSIUM CHLORIDE, SODIUM ASCORBATE AND ASCORBIC ACID 7.5-2.691G
1000 KIT ORAL
Status: COMPLETED | OUTPATIENT
Start: 2020-06-22 | End: 2020-06-22

## 2020-06-22 RX ORDER — SODIUM CHLORIDE, SODIUM LACTATE, POTASSIUM CHLORIDE, CALCIUM CHLORIDE 600; 310; 30; 20 MG/100ML; MG/100ML; MG/100ML; MG/100ML
30 INJECTION, SOLUTION INTRAVENOUS ONCE
Status: COMPLETED | OUTPATIENT
Start: 2020-06-22 | End: 2020-06-22

## 2020-06-22 RX ORDER — ONDANSETRON 2 MG/ML
4 INJECTION INTRAMUSCULAR; INTRAVENOUS ONCE AS NEEDED
Status: DISCONTINUED | OUTPATIENT
Start: 2020-06-22 | End: 2020-06-22 | Stop reason: HOSPADM

## 2020-06-22 RX ORDER — PANTOPRAZOLE SODIUM 40 MG/10ML
40 INJECTION, POWDER, LYOPHILIZED, FOR SOLUTION INTRAVENOUS EVERY 12 HOURS SCHEDULED
Status: DISCONTINUED | OUTPATIENT
Start: 2020-06-22 | End: 2020-06-22

## 2020-06-22 RX ORDER — SIMETHICONE 20 MG/.3ML
200 EMULSION ORAL ONCE
Status: COMPLETED | OUTPATIENT
Start: 2020-06-22 | End: 2020-06-22

## 2020-06-22 RX ORDER — SODIUM CHLORIDE, SODIUM LACTATE, POTASSIUM CHLORIDE, CALCIUM CHLORIDE 600; 310; 30; 20 MG/100ML; MG/100ML; MG/100ML; MG/100ML
INJECTION, SOLUTION INTRAVENOUS CONTINUOUS PRN
Status: DISCONTINUED | OUTPATIENT
Start: 2020-06-22 | End: 2020-06-22 | Stop reason: SURG

## 2020-06-22 RX ORDER — PROPOFOL 10 MG/ML
VIAL (ML) INTRAVENOUS AS NEEDED
Status: DISCONTINUED | OUTPATIENT
Start: 2020-06-22 | End: 2020-06-22 | Stop reason: SURG

## 2020-06-22 RX ORDER — LIDOCAINE HYDROCHLORIDE 10 MG/ML
INJECTION, SOLUTION EPIDURAL; INFILTRATION; INTRACAUDAL; PERINEURAL AS NEEDED
Status: DISCONTINUED | OUTPATIENT
Start: 2020-06-22 | End: 2020-06-22 | Stop reason: SURG

## 2020-06-22 RX ADMIN — MEROPENEM 1 G: 1 INJECTION, POWDER, FOR SOLUTION INTRAVENOUS at 01:41

## 2020-06-22 RX ADMIN — PROPOFOL 50 MG: 10 INJECTION, EMULSION INTRAVENOUS at 15:45

## 2020-06-22 RX ADMIN — LIDOCAINE HYDROCHLORIDE 50 MG: 10 INJECTION, SOLUTION EPIDURAL; INFILTRATION; INTRACAUDAL; PERINEURAL at 15:42

## 2020-06-22 RX ADMIN — SODIUM CHLORIDE, PRESERVATIVE FREE 10 ML: 5 INJECTION INTRAVENOUS at 21:43

## 2020-06-22 RX ADMIN — SIMETHICONE 200 MG: 20 SUSPENSION/ DROPS ORAL at 18:43

## 2020-06-22 RX ADMIN — POLYETHYLENE GLYCOL 3350, SODIUM SULFATE, SODIUM CHLORIDE, POTASSIUM CHLORIDE, ASCORBIC ACID, SODIUM ASCORBATE 1000 ML: KIT at 21:43

## 2020-06-22 RX ADMIN — SODIUM CHLORIDE, PRESERVATIVE FREE 10 ML: 5 INJECTION INTRAVENOUS at 08:45

## 2020-06-22 RX ADMIN — PROPOFOL 50 MG: 10 INJECTION, EMULSION INTRAVENOUS at 15:42

## 2020-06-22 RX ADMIN — PANTOPRAZOLE SODIUM 40 MG: 40 INJECTION, POWDER, FOR SOLUTION INTRAVENOUS at 03:57

## 2020-06-22 RX ADMIN — VANCOMYCIN HYDROCHLORIDE 1250 MG: 10 INJECTION, POWDER, LYOPHILIZED, FOR SOLUTION INTRAVENOUS at 02:23

## 2020-06-22 RX ADMIN — PANTOPRAZOLE SODIUM 40 MG: 40 INJECTION, POWDER, FOR SOLUTION INTRAVENOUS at 18:04

## 2020-06-22 RX ADMIN — SODIUM CHLORIDE, POTASSIUM CHLORIDE, SODIUM LACTATE AND CALCIUM CHLORIDE: 600; 310; 30; 20 INJECTION, SOLUTION INTRAVENOUS at 15:37

## 2020-06-22 RX ADMIN — SODIUM CHLORIDE, POTASSIUM CHLORIDE, SODIUM LACTATE AND CALCIUM CHLORIDE 30 ML/HR: 600; 310; 30; 20 INJECTION, SOLUTION INTRAVENOUS at 14:59

## 2020-06-22 RX ADMIN — SODIUM CHLORIDE 100 ML/HR: 9 INJECTION, SOLUTION INTRAVENOUS at 01:40

## 2020-06-22 RX ADMIN — MEROPENEM 1 G: 1 INJECTION, POWDER, FOR SOLUTION INTRAVENOUS at 08:44

## 2020-06-22 NOTE — ANESTHESIA POSTPROCEDURE EVALUATION
Patient: Ro Walker    Procedure Summary     Date:  06/22/20 Room / Location:  [facility] ENDOSCOPY 1 /  [facility]    Anesthesia Start:  1538 Anesthesia Stop:      Procedure:  ESOPHAGOGASTRODUODENOSCOPY (Left ) Diagnosis:  (GI Bleed)    Surgeon:  Kareem Feldman MD Provider:  Archie Osman MD    Anesthesia Type:  general ASA Status:  3          Anesthesia Type: general    Vitals  No vitals data found for the desired time range.          Post Anesthesia Care and Evaluation    Patient location during evaluation: PACU  Patient participation: complete - patient participated  Level of consciousness: awake and alert  Pain score: 0  Pain management: adequate  Airway patency: patent  Anesthetic complications: No anesthetic complications  PONV Status: none  Cardiovascular status: hemodynamically stable and acceptable  Respiratory status: nonlabored ventilation, acceptable and nasal cannula  Hydration status: acceptable

## 2020-06-22 NOTE — OUTREACH NOTE
General Surgery Week 1 Survey      Responses   [facility] patient discharged from?  [place]   Does the patient have one of the following disease processes/diagnoses(primary or secondary)?  General Surgery   Is there a successful TCM telephone encounter documented?  No   Week 1 attempt successful?  No   Revoke  Readmitted          Vicki Walker RN

## 2020-06-22 NOTE — ANESTHESIA PREPROCEDURE EVALUATION
Anesthesia Evaluation                  Airway   Mallampati: II  TM distance: >3 FB  Neck ROM: full  No difficulty expected  Dental - normal exam     Pulmonary - normal exam   (+) a smoker Current,   Cardiovascular - normal exam    (+) DVT (eliquis tx),       Neuro/Psych  GI/Hepatic/Renal/Endo    (+) morbid obesity, GERD, GI bleeding (HCT 21 s/p transfusion after ) ,     ROS Comment: SLE    Musculoskeletal     Abdominal  - normal exam    Bowel sounds: normal.   Substance History      OB/GYN          Other                        Anesthesia Plan    ASA 3     general   (Propofol)  intravenous induction     Anesthetic plan, all risks, benefits, and alternatives have been provided, discussed and informed consent has been obtained with: patient.    Plan discussed with CRNA.

## 2020-06-23 ENCOUNTER — ANESTHESIA EVENT (OUTPATIENT)
Dept: GASTROENTEROLOGY | Facility: HOSPITAL | Age: 70
End: 2020-06-23

## 2020-06-23 ENCOUNTER — APPOINTMENT (OUTPATIENT)
Dept: GASTROENTEROLOGY | Facility: HOSPITAL | Age: 70
End: 2020-06-23

## 2020-06-23 ENCOUNTER — ANESTHESIA (OUTPATIENT)
Dept: GASTROENTEROLOGY | Facility: HOSPITAL | Age: 70
End: 2020-06-23

## 2020-06-23 LAB
ALBUMIN SERPL-MCNC: 2.2 G/DL (ref 3.5–5.2)
ALBUMIN/GLOB SERPL: 0.9 G/DL
ALP SERPL-CCNC: 47 U/L (ref 39–117)
ALT SERPL W P-5'-P-CCNC: 21 U/L (ref 1–33)
ANION GAP SERPL CALCULATED.3IONS-SCNC: 15 MMOL/L (ref 5–15)
AST SERPL-CCNC: 25 U/L (ref 1–32)
BASOPHILS # BLD AUTO: 0.02 10*3/MM3 (ref 0–0.2)
BASOPHILS NFR BLD AUTO: 0.2 % (ref 0–1.5)
BH BB BLOOD EXPIRATION DATE: NORMAL
BH BB BLOOD TYPE BARCODE: 5100
BH BB BLOOD TYPE BARCODE: 5100
BH BB BLOOD TYPE BARCODE: 9500
BH BB DISPENSE STATUS: NORMAL
BH BB PRODUCT CODE: NORMAL
BH BB UNIT NUMBER: NORMAL
BILIRUB SERPL-MCNC: 0.6 MG/DL (ref 0.2–1.2)
BUN BLD-MCNC: 12 MG/DL (ref 8–23)
BUN/CREAT SERPL: 25.5 (ref 7–25)
CALCIUM SPEC-SCNC: 6.8 MG/DL (ref 8.6–10.5)
CHLORIDE SERPL-SCNC: 112 MMOL/L (ref 98–107)
CK SERPL-CCNC: 52 U/L (ref 20–180)
CO2 SERPL-SCNC: 20 MMOL/L (ref 22–29)
CREAT BLD-MCNC: 0.47 MG/DL (ref 0.57–1)
CROSSMATCH INTERPRETATION: NORMAL
D-LACTATE SERPL-SCNC: 0.8 MMOL/L (ref 0.5–2)
DEPRECATED RDW RBC AUTO: 48.8 FL (ref 37–54)
EOSINOPHIL # BLD AUTO: 0.01 10*3/MM3 (ref 0–0.4)
EOSINOPHIL NFR BLD AUTO: 0.1 % (ref 0.3–6.2)
ERYTHROCYTE [DISTWIDTH] IN BLOOD BY AUTOMATED COUNT: 14.6 % (ref 12.3–15.4)
GFR SERPL CREATININE-BSD FRML MDRD: 130 ML/MIN/1.73
GLOBULIN UR ELPH-MCNC: 2.4 GM/DL
GLUCOSE BLD-MCNC: 55 MG/DL (ref 65–99)
GLUCOSE BLDC GLUCOMTR-MCNC: 74 MG/DL (ref 70–130)
HCT VFR BLD AUTO: 30.8 % (ref 34–46.6)
HCT VFR BLD AUTO: 34 % (ref 34–46.6)
HCT VFR BLD AUTO: 35.2 % (ref 34–46.6)
HGB BLD-MCNC: 10 G/DL (ref 12–15.9)
HGB BLD-MCNC: 11.2 G/DL (ref 12–15.9)
HGB BLD-MCNC: 11.3 G/DL (ref 12–15.9)
IMM GRANULOCYTES # BLD AUTO: 0.12 10*3/MM3 (ref 0–0.05)
IMM GRANULOCYTES NFR BLD AUTO: 1.3 % (ref 0–0.5)
LYMPHOCYTES # BLD AUTO: 1.09 10*3/MM3 (ref 0.7–3.1)
LYMPHOCYTES NFR BLD AUTO: 12 % (ref 19.6–45.3)
MCH RBC QN AUTO: 29.6 PG (ref 26.6–33)
MCHC RBC AUTO-ENTMCNC: 32.5 G/DL (ref 31.5–35.7)
MCV RBC AUTO: 91.1 FL (ref 79–97)
MONOCYTES # BLD AUTO: 0.7 10*3/MM3 (ref 0.1–0.9)
MONOCYTES NFR BLD AUTO: 7.7 % (ref 5–12)
NEUTROPHILS # BLD AUTO: 7.12 10*3/MM3 (ref 1.7–7)
NEUTROPHILS NFR BLD AUTO: 78.7 % (ref 42.7–76)
NRBC BLD AUTO-RTO: 0 /100 WBC (ref 0–0.2)
PLATELET # BLD AUTO: 189 10*3/MM3 (ref 140–450)
PMV BLD AUTO: 11.7 FL (ref 6–12)
POTASSIUM BLD-SCNC: 3.3 MMOL/L (ref 3.5–5.2)
POTASSIUM BLD-SCNC: 4.5 MMOL/L (ref 3.5–5.2)
PREALB SERPL-MCNC: 4.9 MG/DL (ref 20–40)
PROT SERPL-MCNC: 4.6 G/DL (ref 6–8.5)
RBC # BLD AUTO: 3.38 10*6/MM3 (ref 3.77–5.28)
SODIUM BLD-SCNC: 147 MMOL/L (ref 136–145)
UNIT  ABO: NORMAL
UNIT  RH: NORMAL
WBC NRBC COR # BLD: 9.06 10*3/MM3 (ref 3.4–10.8)

## 2020-06-23 PROCEDURE — 0DJD8ZZ INSPECTION OF LOWER INTESTINAL TRACT, VIA NATURAL OR ARTIFICIAL OPENING ENDOSCOPIC: ICD-10-PCS | Performed by: INTERNAL MEDICINE

## 2020-06-23 PROCEDURE — 85018 HEMOGLOBIN: CPT | Performed by: INTERNAL MEDICINE

## 2020-06-23 PROCEDURE — 83605 ASSAY OF LACTIC ACID: CPT | Performed by: INTERNAL MEDICINE

## 2020-06-23 PROCEDURE — 25010000002 MEROPENEM PER 100 MG: Performed by: INTERNAL MEDICINE

## 2020-06-23 PROCEDURE — 25010000002 VANCOMYCIN PER 500 MG: Performed by: INTERNAL MEDICINE

## 2020-06-23 PROCEDURE — 85014 HEMATOCRIT: CPT | Performed by: INTERNAL MEDICINE

## 2020-06-23 PROCEDURE — 84132 ASSAY OF SERUM POTASSIUM: CPT | Performed by: HOSPITALIST

## 2020-06-23 PROCEDURE — 25010000002 PIPERACILLIN SOD-TAZOBACTAM PER 1 G: Performed by: INTERNAL MEDICINE

## 2020-06-23 PROCEDURE — 82962 GLUCOSE BLOOD TEST: CPT

## 2020-06-23 PROCEDURE — 85025 COMPLETE CBC W/AUTO DIFF WBC: CPT | Performed by: INTERNAL MEDICINE

## 2020-06-23 PROCEDURE — 99232 SBSQ HOSP IP/OBS MODERATE 35: CPT | Performed by: HOSPITALIST

## 2020-06-23 PROCEDURE — 84134 ASSAY OF PREALBUMIN: CPT | Performed by: INTERNAL MEDICINE

## 2020-06-23 PROCEDURE — 99232 SBSQ HOSP IP/OBS MODERATE 35: CPT | Performed by: SURGERY

## 2020-06-23 PROCEDURE — 82550 ASSAY OF CK (CPK): CPT | Performed by: INTERNAL MEDICINE

## 2020-06-23 PROCEDURE — 91110 GI TRC IMG INTRAL ESOPH-ILE: CPT

## 2020-06-23 PROCEDURE — 80053 COMPREHEN METABOLIC PANEL: CPT | Performed by: INTERNAL MEDICINE

## 2020-06-23 PROCEDURE — 25010000002 PROPOFOL 10 MG/ML EMULSION: Performed by: NURSE ANESTHETIST, CERTIFIED REGISTERED

## 2020-06-23 RX ORDER — ONDANSETRON 2 MG/ML
4 INJECTION INTRAMUSCULAR; INTRAVENOUS ONCE AS NEEDED
Status: DISCONTINUED | OUTPATIENT
Start: 2020-06-23 | End: 2020-06-23 | Stop reason: HOSPADM

## 2020-06-23 RX ORDER — PROMETHAZINE HYDROCHLORIDE 25 MG/ML
6.25 INJECTION, SOLUTION INTRAMUSCULAR; INTRAVENOUS ONCE AS NEEDED
Status: DISCONTINUED | OUTPATIENT
Start: 2020-06-23 | End: 2020-06-23 | Stop reason: HOSPADM

## 2020-06-23 RX ORDER — PROMETHAZINE HYDROCHLORIDE 25 MG/1
25 SUPPOSITORY RECTAL ONCE AS NEEDED
Status: DISCONTINUED | OUTPATIENT
Start: 2020-06-23 | End: 2020-06-23 | Stop reason: HOSPADM

## 2020-06-23 RX ORDER — L.ACID,PARA/B.BIFIDUM/S.THERM 8B CELL
1 CAPSULE ORAL DAILY
Status: DISCONTINUED | OUTPATIENT
Start: 2020-06-23 | End: 2020-06-25 | Stop reason: HOSPADM

## 2020-06-23 RX ORDER — POTASSIUM CHLORIDE 750 MG/1
40 CAPSULE, EXTENDED RELEASE ORAL AS NEEDED
Status: DISCONTINUED | OUTPATIENT
Start: 2020-06-23 | End: 2020-06-25 | Stop reason: HOSPADM

## 2020-06-23 RX ORDER — IPRATROPIUM BROMIDE AND ALBUTEROL SULFATE 2.5; .5 MG/3ML; MG/3ML
3 SOLUTION RESPIRATORY (INHALATION) ONCE AS NEEDED
Status: DISCONTINUED | OUTPATIENT
Start: 2020-06-23 | End: 2020-06-23 | Stop reason: HOSPADM

## 2020-06-23 RX ORDER — ACETAMINOPHEN 325 MG/1
650 TABLET ORAL ONCE AS NEEDED
Status: DISCONTINUED | OUTPATIENT
Start: 2020-06-23 | End: 2020-06-23 | Stop reason: HOSPADM

## 2020-06-23 RX ORDER — ACETAMINOPHEN 650 MG/1
650 SUPPOSITORY RECTAL ONCE AS NEEDED
Status: DISCONTINUED | OUTPATIENT
Start: 2020-06-23 | End: 2020-06-23 | Stop reason: HOSPADM

## 2020-06-23 RX ORDER — PROMETHAZINE HYDROCHLORIDE 25 MG/1
25 TABLET ORAL ONCE AS NEEDED
Status: DISCONTINUED | OUTPATIENT
Start: 2020-06-23 | End: 2020-06-23 | Stop reason: HOSPADM

## 2020-06-23 RX ORDER — SODIUM CHLORIDE, SODIUM LACTATE, POTASSIUM CHLORIDE, AND CALCIUM CHLORIDE .6; .31; .03; .02 G/100ML; G/100ML; G/100ML; G/100ML
20 INJECTION, SOLUTION INTRAVENOUS CONTINUOUS
Status: DISCONTINUED | OUTPATIENT
Start: 2020-06-23 | End: 2020-06-25 | Stop reason: HOSPADM

## 2020-06-23 RX ORDER — PROPOFOL 10 MG/ML
VIAL (ML) INTRAVENOUS AS NEEDED
Status: DISCONTINUED | OUTPATIENT
Start: 2020-06-23 | End: 2020-06-23 | Stop reason: SURG

## 2020-06-23 RX ORDER — POTASSIUM CHLORIDE 1.5 G/1.77G
40 POWDER, FOR SOLUTION ORAL AS NEEDED
Status: DISCONTINUED | OUTPATIENT
Start: 2020-06-23 | End: 2020-06-25 | Stop reason: HOSPADM

## 2020-06-23 RX ORDER — SIMETHICONE 20 MG/.3ML
40 EMULSION ORAL ONCE
Status: COMPLETED | OUTPATIENT
Start: 2020-06-23 | End: 2020-06-23

## 2020-06-23 RX ADMIN — PROPOFOL 30 MG: 10 INJECTION, EMULSION INTRAVENOUS at 10:41

## 2020-06-23 RX ADMIN — METOPROLOL TARTRATE 2 MG: 5 INJECTION, SOLUTION INTRAVENOUS at 10:56

## 2020-06-23 RX ADMIN — TAZOBACTAM SODIUM AND PIPERACILLIN SODIUM 3.38 G: 375; 3 INJECTION, SOLUTION INTRAVENOUS at 16:37

## 2020-06-23 RX ADMIN — PROPOFOL 140 MCG/KG/MIN: 10 INJECTION, EMULSION INTRAVENOUS at 10:40

## 2020-06-23 RX ADMIN — POTASSIUM CHLORIDE 40 MEQ: 10 CAPSULE, COATED, EXTENDED RELEASE ORAL at 18:48

## 2020-06-23 RX ADMIN — MEROPENEM 1 G: 1 INJECTION, POWDER, FOR SOLUTION INTRAVENOUS at 01:39

## 2020-06-23 RX ADMIN — POLYETHYLENE GLYCOL 3350, SODIUM SULFATE, SODIUM CHLORIDE, POTASSIUM CHLORIDE, ASCORBIC ACID, SODIUM ASCORBATE 1000 ML: KIT at 04:52

## 2020-06-23 RX ADMIN — VANCOMYCIN HYDROCHLORIDE 750 MG: 750 INJECTION, SOLUTION INTRAVENOUS at 05:18

## 2020-06-23 RX ADMIN — SIMETHICONE 40 MG: 20 SUSPENSION/ DROPS ORAL at 11:37

## 2020-06-23 RX ADMIN — SODIUM CHLORIDE, POTASSIUM CHLORIDE, SODIUM LACTATE AND CALCIUM CHLORIDE 20 ML/HR: 600; 310; 30; 20 INJECTION, SOLUTION INTRAVENOUS at 10:05

## 2020-06-23 RX ADMIN — PANTOPRAZOLE SODIUM 40 MG: 40 INJECTION, POWDER, FOR SOLUTION INTRAVENOUS at 09:05

## 2020-06-23 RX ADMIN — Medication 1 CAPSULE: at 20:50

## 2020-06-23 RX ADMIN — PROPOFOL 20 MG: 10 INJECTION, EMULSION INTRAVENOUS at 10:42

## 2020-06-23 RX ADMIN — PANTOPRAZOLE SODIUM 40 MG: 40 INJECTION, POWDER, FOR SOLUTION INTRAVENOUS at 20:50

## 2020-06-23 RX ADMIN — SODIUM CHLORIDE, PRESERVATIVE FREE 10 ML: 5 INJECTION INTRAVENOUS at 09:06

## 2020-06-23 RX ADMIN — SODIUM CHLORIDE, PRESERVATIVE FREE 10 ML: 5 INJECTION INTRAVENOUS at 20:50

## 2020-06-23 RX ADMIN — POTASSIUM CHLORIDE 40 MEQ: 10 CAPSULE, COATED, EXTENDED RELEASE ORAL at 14:45

## 2020-06-23 NOTE — ANESTHESIA PREPROCEDURE EVALUATION
Anesthesia Evaluation     Patient summary reviewed and Nursing notes reviewed   NPO Solid Status: > 8 hours  NPO Liquid Status: > 8 hours           Airway   Mallampati: II  TM distance: >3 FB  Neck ROM: full  No difficulty expected  Dental      Pulmonary    (+) pneumonia stable , a smoker Current, COPD mild,   (-) asthma, shortness of breath, recent URI, no home oxygen    ROS comment: sats 96% RA  Cardiovascular     ECG reviewed    (+) DVT (Eliquis),   (-) hypertension, past MI, dysrhythmias, angina, hyperlipidemia    ROS comment: Normal sinus rhythm  Incomplete right bundle branch block  Nonspecific T wave abnormality    Neuro/Psych  (-) seizures, CVA  GI/Hepatic/Renal/Endo    (+)  GI bleeding (on Eliquis ) ,   (-) liver disease, no renal disease, diabetes, no thyroid disorder    Musculoskeletal     Abdominal    Substance History      OB/GYN          Other      history of cancer (cervical)      Other Comment: SLE  ROS/Med Hx Other: DVT after SBO surgery put on Eliquis   (Also Post op intra abd abcess and  post op PNA )    BSL 52  HCT 30 (SP PRBC)     Vocal cord polyp remote                 Anesthesia Plan    ASA 3     general and MAC     intravenous induction     Anesthetic plan, all risks, benefits, and alternatives have been provided, discussed and informed consent has been obtained with: patient.    Plan discussed with CRNA.

## 2020-06-23 NOTE — ANESTHESIA POSTPROCEDURE EVALUATION
Patient: Ro Walker    Procedure Summary     Date:  06/23/20 Room / Location:   [facility]    Anesthesia Start:  1039 Anesthesia Stop:  1121    Procedure:  COLONOSCOPY (N/A ) Diagnosis:       Gastrointestinal hemorrhage with melena      (Gastrointestinal hemorrhage with melena [K92.1])    Surgeon:  Kareem Feldman MD Provider:  Abdullahi Ferreira MD    Anesthesia Type:  general, MAC ASA Status:  3          Anesthesia Type: general, MAC    Vitals  No vitals data found for the desired time range.          Post Anesthesia Care and Evaluation    Patient location during evaluation: PACU  Patient participation: complete - patient participated  Level of consciousness: awake and alert  Pain score: 0  Pain management: adequate  Airway patency: patent  Anesthetic complications: No anesthetic complications  PONV Status: none  Cardiovascular status: hemodynamically stable and acceptable  Respiratory status: nonlabored ventilation, acceptable and nasal cannula  Hydration status: acceptable

## 2020-06-24 LAB
ALBUMIN SERPL-MCNC: 2.2 G/DL (ref 3.5–5.2)
ALBUMIN/GLOB SERPL: 0.7 G/DL
ALP SERPL-CCNC: 46 U/L (ref 39–117)
ALT SERPL W P-5'-P-CCNC: 19 U/L (ref 1–33)
ANION GAP SERPL CALCULATED.3IONS-SCNC: 11 MMOL/L (ref 5–15)
AST SERPL-CCNC: 20 U/L (ref 1–32)
BASOPHILS # BLD AUTO: 0.02 10*3/MM3 (ref 0–0.2)
BASOPHILS NFR BLD AUTO: 0.2 % (ref 0–1.5)
BILIRUB SERPL-MCNC: 0.6 MG/DL (ref 0.2–1.2)
BUN BLD-MCNC: 7 MG/DL (ref 8–23)
BUN/CREAT SERPL: 12.5 (ref 7–25)
CALCIUM SPEC-SCNC: 7.9 MG/DL (ref 8.6–10.5)
CHLORIDE SERPL-SCNC: 105 MMOL/L (ref 98–107)
CO2 SERPL-SCNC: 21 MMOL/L (ref 22–29)
CREAT BLD-MCNC: 0.56 MG/DL (ref 0.57–1)
CRP SERPL-MCNC: 6.16 MG/DL (ref 0–0.5)
CYTO UR: NORMAL
D-LACTATE SERPL-SCNC: 0.8 MMOL/L (ref 0.5–2)
DEPRECATED RDW RBC AUTO: 49.3 FL (ref 37–54)
EOSINOPHIL # BLD AUTO: 0.01 10*3/MM3 (ref 0–0.4)
EOSINOPHIL NFR BLD AUTO: 0.1 % (ref 0.3–6.2)
ERYTHROCYTE [DISTWIDTH] IN BLOOD BY AUTOMATED COUNT: 14.5 % (ref 12.3–15.4)
ERYTHROCYTE [SEDIMENTATION RATE] IN BLOOD: 26 MM/HR (ref 0–30)
GFR SERPL CREATININE-BSD FRML MDRD: 106 ML/MIN/1.73
GLOBULIN UR ELPH-MCNC: 3.2 GM/DL
GLUCOSE BLD-MCNC: 76 MG/DL (ref 65–99)
HCT VFR BLD AUTO: 34 % (ref 34–46.6)
HGB BLD-MCNC: 10.6 G/DL (ref 12–15.9)
IMM GRANULOCYTES # BLD AUTO: 0.07 10*3/MM3 (ref 0–0.05)
IMM GRANULOCYTES NFR BLD AUTO: 0.8 % (ref 0–0.5)
INR PPP: 1.1 (ref 0.85–1.16)
LAB AP CASE REPORT: NORMAL
LAB AP CLINICAL INFORMATION: NORMAL
LYMPHOCYTES # BLD AUTO: 1.11 10*3/MM3 (ref 0.7–3.1)
LYMPHOCYTES NFR BLD AUTO: 12.1 % (ref 19.6–45.3)
MAGNESIUM SERPL-MCNC: 2.1 MG/DL (ref 1.6–2.4)
MCH RBC QN AUTO: 29.2 PG (ref 26.6–33)
MCHC RBC AUTO-ENTMCNC: 31.2 G/DL (ref 31.5–35.7)
MCV RBC AUTO: 93.7 FL (ref 79–97)
MONOCYTES # BLD AUTO: 0.57 10*3/MM3 (ref 0.1–0.9)
MONOCYTES NFR BLD AUTO: 6.2 % (ref 5–12)
NEUTROPHILS # BLD AUTO: 7.39 10*3/MM3 (ref 1.7–7)
NEUTROPHILS NFR BLD AUTO: 80.6 % (ref 42.7–76)
NRBC BLD AUTO-RTO: 0 /100 WBC (ref 0–0.2)
PATH REPORT.FINAL DX SPEC: NORMAL
PATH REPORT.GROSS SPEC: NORMAL
PHOSPHATE SERPL-MCNC: 2.5 MG/DL (ref 2.5–4.5)
PLATELET # BLD AUTO: 208 10*3/MM3 (ref 140–450)
PMV BLD AUTO: 11 FL (ref 6–12)
POTASSIUM BLD-SCNC: 4.4 MMOL/L (ref 3.5–5.2)
PROCALCITONIN SERPL-MCNC: 0.1 NG/ML (ref 0.1–0.25)
PROT SERPL-MCNC: 5.4 G/DL (ref 6–8.5)
PROTHROMBIN TIME: 13.9 SECONDS (ref 11.5–14)
RBC # BLD AUTO: 3.63 10*6/MM3 (ref 3.77–5.28)
SODIUM BLD-SCNC: 137 MMOL/L (ref 136–145)
VANCOMYCIN TROUGH SERPL-MCNC: 11.6 MCG/ML (ref 5–20)
WBC NRBC COR # BLD: 9.17 10*3/MM3 (ref 3.4–10.8)

## 2020-06-24 PROCEDURE — 84100 ASSAY OF PHOSPHORUS: CPT | Performed by: HOSPITALIST

## 2020-06-24 PROCEDURE — 86140 C-REACTIVE PROTEIN: CPT | Performed by: HOSPITALIST

## 2020-06-24 PROCEDURE — 99232 SBSQ HOSP IP/OBS MODERATE 35: CPT | Performed by: HOSPITALIST

## 2020-06-24 PROCEDURE — 84145 PROCALCITONIN (PCT): CPT | Performed by: HOSPITALIST

## 2020-06-24 PROCEDURE — 99024 POSTOP FOLLOW-UP VISIT: CPT | Performed by: SURGERY

## 2020-06-24 PROCEDURE — 83605 ASSAY OF LACTIC ACID: CPT | Performed by: INTERNAL MEDICINE

## 2020-06-24 PROCEDURE — 99232 SBSQ HOSP IP/OBS MODERATE 35: CPT | Performed by: PHYSICIAN ASSISTANT

## 2020-06-24 PROCEDURE — 25010000002 VANCOMYCIN PER 500 MG: Performed by: INTERNAL MEDICINE

## 2020-06-24 PROCEDURE — 85610 PROTHROMBIN TIME: CPT | Performed by: HOSPITALIST

## 2020-06-24 PROCEDURE — 25010000002 PIPERACILLIN SOD-TAZOBACTAM PER 1 G: Performed by: INTERNAL MEDICINE

## 2020-06-24 PROCEDURE — 80202 ASSAY OF VANCOMYCIN: CPT | Performed by: INTERNAL MEDICINE

## 2020-06-24 PROCEDURE — 85652 RBC SED RATE AUTOMATED: CPT | Performed by: HOSPITALIST

## 2020-06-24 PROCEDURE — 85025 COMPLETE CBC W/AUTO DIFF WBC: CPT | Performed by: INTERNAL MEDICINE

## 2020-06-24 PROCEDURE — 83735 ASSAY OF MAGNESIUM: CPT | Performed by: HOSPITALIST

## 2020-06-24 PROCEDURE — 80053 COMPREHEN METABOLIC PANEL: CPT | Performed by: INTERNAL MEDICINE

## 2020-06-24 RX ORDER — PANTOPRAZOLE SODIUM 40 MG/1
40 TABLET, DELAYED RELEASE ORAL
Status: DISCONTINUED | OUTPATIENT
Start: 2020-06-24 | End: 2020-06-25 | Stop reason: HOSPADM

## 2020-06-24 RX ADMIN — SODIUM CHLORIDE, PRESERVATIVE FREE 10 ML: 5 INJECTION INTRAVENOUS at 20:15

## 2020-06-24 RX ADMIN — SODIUM CHLORIDE, POTASSIUM CHLORIDE, SODIUM LACTATE AND CALCIUM CHLORIDE 20 ML/HR: 600; 310; 30; 20 INJECTION, SOLUTION INTRAVENOUS at 22:09

## 2020-06-24 RX ADMIN — TAZOBACTAM SODIUM AND PIPERACILLIN SODIUM 3.38 G: 375; 3 INJECTION, SOLUTION INTRAVENOUS at 09:30

## 2020-06-24 RX ADMIN — PANTOPRAZOLE SODIUM 40 MG: 40 INJECTION, POWDER, FOR SOLUTION INTRAVENOUS at 09:02

## 2020-06-24 RX ADMIN — TAZOBACTAM SODIUM AND PIPERACILLIN SODIUM 3.38 G: 375; 3 INJECTION, SOLUTION INTRAVENOUS at 16:57

## 2020-06-24 RX ADMIN — PANTOPRAZOLE SODIUM 40 MG: 40 TABLET, DELAYED RELEASE ORAL at 16:57

## 2020-06-24 RX ADMIN — APIXABAN 5 MG: 5 TABLET, FILM COATED ORAL at 20:15

## 2020-06-24 RX ADMIN — SODIUM CHLORIDE, PRESERVATIVE FREE 10 ML: 5 INJECTION INTRAVENOUS at 09:03

## 2020-06-24 RX ADMIN — TAZOBACTAM SODIUM AND PIPERACILLIN SODIUM 3.38 G: 375; 3 INJECTION, SOLUTION INTRAVENOUS at 00:04

## 2020-06-24 RX ADMIN — Medication 1 CAPSULE: at 09:02

## 2020-06-24 RX ADMIN — VANCOMYCIN HYDROCHLORIDE 750 MG: 750 INJECTION, SOLUTION INTRAVENOUS at 09:02

## 2020-06-25 VITALS
OXYGEN SATURATION: 97 % | HEIGHT: 61 IN | BODY MASS INDEX: 21.11 KG/M2 | WEIGHT: 111.8 LBS | SYSTOLIC BLOOD PRESSURE: 171 MMHG | HEART RATE: 70 BPM | DIASTOLIC BLOOD PRESSURE: 83 MMHG | TEMPERATURE: 98.4 F | RESPIRATION RATE: 16 BRPM

## 2020-06-25 LAB
DEPRECATED RDW RBC AUTO: 47 FL (ref 37–54)
ERYTHROCYTE [DISTWIDTH] IN BLOOD BY AUTOMATED COUNT: 14.2 % (ref 12.3–15.4)
HCT VFR BLD AUTO: 34.5 % (ref 34–46.6)
HGB BLD-MCNC: 11.2 G/DL (ref 12–15.9)
MCH RBC QN AUTO: 29.6 PG (ref 26.6–33)
MCHC RBC AUTO-ENTMCNC: 32.5 G/DL (ref 31.5–35.7)
MCV RBC AUTO: 91 FL (ref 79–97)
PLAT MORPH BLD: NORMAL
PLATELET # BLD AUTO: 240 10*3/MM3 (ref 140–450)
PMV BLD AUTO: 11.2 FL (ref 6–12)
RBC # BLD AUTO: 3.79 10*6/MM3 (ref 3.77–5.28)
RBC MORPH BLD: NORMAL
WBC MORPH BLD: NORMAL
WBC NRBC COR # BLD: 8.15 10*3/MM3 (ref 3.4–10.8)

## 2020-06-25 PROCEDURE — 25010000002 PIPERACILLIN SOD-TAZOBACTAM PER 1 G: Performed by: INTERNAL MEDICINE

## 2020-06-25 PROCEDURE — 85007 BL SMEAR W/DIFF WBC COUNT: CPT | Performed by: INTERNAL MEDICINE

## 2020-06-25 PROCEDURE — 85025 COMPLETE CBC W/AUTO DIFF WBC: CPT | Performed by: INTERNAL MEDICINE

## 2020-06-25 PROCEDURE — 99231 SBSQ HOSP IP/OBS SF/LOW 25: CPT | Performed by: NURSE PRACTITIONER

## 2020-06-25 PROCEDURE — 99239 HOSP IP/OBS DSCHRG MGMT >30: CPT | Performed by: INTERNAL MEDICINE

## 2020-06-25 PROCEDURE — 25010000002 VANCOMYCIN PER 500 MG: Performed by: INTERNAL MEDICINE

## 2020-06-25 RX ORDER — HYDROXYCHLOROQUINE SULFATE 200 MG/1
400 TABLET, FILM COATED ORAL DAILY
Start: 2020-07-07 | End: 2020-08-06

## 2020-06-25 RX ORDER — L.ACID,PARA/B.BIFIDUM/S.THERM 8B CELL
1 CAPSULE ORAL DAILY
Qty: 30 CAPSULE | Refills: 0 | Status: SHIPPED | OUTPATIENT
Start: 2020-06-26 | End: 2022-11-21

## 2020-06-25 RX ORDER — ACETAMINOPHEN 325 MG/1
650 TABLET ORAL EVERY 4 HOURS PRN
Start: 2020-06-25 | End: 2022-11-21

## 2020-06-25 RX ORDER — L.ACID,PARA/B.BIFIDUM/S.THERM 8B CELL
1 CAPSULE ORAL DAILY
Qty: 30 CAPSULE | Refills: 0 | Status: SHIPPED | OUTPATIENT
Start: 2020-06-26 | End: 2020-06-25

## 2020-06-25 RX ADMIN — APIXABAN 5 MG: 5 TABLET, FILM COATED ORAL at 08:37

## 2020-06-25 RX ADMIN — PANTOPRAZOLE SODIUM 40 MG: 40 TABLET, DELAYED RELEASE ORAL at 06:30

## 2020-06-25 RX ADMIN — TAZOBACTAM SODIUM AND PIPERACILLIN SODIUM 3.38 G: 375; 3 INJECTION, SOLUTION INTRAVENOUS at 00:57

## 2020-06-25 RX ADMIN — VANCOMYCIN HYDROCHLORIDE 750 MG: 750 INJECTION, SOLUTION INTRAVENOUS at 11:34

## 2020-06-25 RX ADMIN — Medication 1 CAPSULE: at 08:37

## 2020-06-25 RX ADMIN — TAZOBACTAM SODIUM AND PIPERACILLIN SODIUM 3.38 G: 375; 3 INJECTION, SOLUTION INTRAVENOUS at 08:37

## 2020-06-25 RX ADMIN — SODIUM CHLORIDE, PRESERVATIVE FREE 10 ML: 5 INJECTION INTRAVENOUS at 08:42

## 2020-06-26 ENCOUNTER — READMISSION MANAGEMENT (OUTPATIENT)
Dept: CALL CENTER | Facility: HOSPITAL | Age: 70
End: 2020-06-26

## 2020-06-26 NOTE — OUTREACH NOTE
Prep Survey      Responses   [employer] facility patient discharged from?  [place]   Is LACE score < 7 ?  No   Eligibility  Readm Mgmt   Discharge diagnosis  GIB (gastrointestinal bleeding   Does the patient have one of the following disease processes/diagnoses(primary or secondary)?  Other   Does the patient have Home health ordered?  No   Is there a DME ordered?  No   Prep survey completed?  Yes          Keri Krueger RN

## 2020-06-27 LAB
BACTERIA SPEC AEROBE CULT: NORMAL
BACTERIA SPEC AEROBE CULT: NORMAL

## 2020-06-30 ENCOUNTER — READMISSION MANAGEMENT (OUTPATIENT)
Dept: CALL CENTER | Facility: HOSPITAL | Age: 70
End: 2020-06-30

## 2020-06-30 NOTE — OUTREACH NOTE
Medical Week 1 Survey      Responses   [facility] patient discharged from?  [place]   COVID-19 Test Status  Negative   Does the patient have one of the following disease processes/diagnoses(primary or secondary)?  Other   Is there a successful TCM telephone encounter documented?  No   Week 1 attempt successful?  Yes   Call start time  0930   Rescheduled  Rescheduled-pt requested [She is getting an infusion and not able to talk. ]   Call end time  0930   Discharge diagnosis  GIB (gastrointestinal bleeding          Patricia Talley RN

## 2020-07-03 ENCOUNTER — READMISSION MANAGEMENT (OUTPATIENT)
Dept: CALL CENTER | Facility: HOSPITAL | Age: 70
End: 2020-07-03

## 2020-07-15 ENCOUNTER — TRANSCRIBE ORDERS (OUTPATIENT)
Dept: LAB | Facility: HOSPITAL | Age: 70
End: 2020-07-15

## 2020-07-15 ENCOUNTER — TELEPHONE (OUTPATIENT)
Dept: BARIATRICS/WEIGHT MGMT | Facility: CLINIC | Age: 70
End: 2020-07-15

## 2020-07-15 ENCOUNTER — OFFICE VISIT (OUTPATIENT)
Dept: BARIATRICS/WEIGHT MGMT | Facility: CLINIC | Age: 70
End: 2020-07-15

## 2020-07-15 ENCOUNTER — LAB (OUTPATIENT)
Dept: LAB | Facility: HOSPITAL | Age: 70
End: 2020-07-15

## 2020-07-15 VITALS
WEIGHT: 100 LBS | BODY MASS INDEX: 18.88 KG/M2 | HEART RATE: 74 BPM | TEMPERATURE: 98 F | DIASTOLIC BLOOD PRESSURE: 76 MMHG | OXYGEN SATURATION: 98 % | SYSTOLIC BLOOD PRESSURE: 120 MMHG | RESPIRATION RATE: 18 BRPM | HEIGHT: 61 IN

## 2020-07-15 DIAGNOSIS — L03.319 CELLULITIS OF PUBIC REGION: Primary | ICD-10-CM

## 2020-07-15 DIAGNOSIS — K66.1 RETROPERITONEAL HEMATOMA: ICD-10-CM

## 2020-07-15 DIAGNOSIS — I82.412 DEEP VEIN THROMBOSIS (DVT) OF FEMORAL VEIN OF LEFT LOWER EXTREMITY, UNSPECIFIED CHRONICITY (HCC): ICD-10-CM

## 2020-07-15 DIAGNOSIS — E83.51 HYPOCALCEMIA: ICD-10-CM

## 2020-07-15 DIAGNOSIS — D64.9 ANEMIA, UNSPECIFIED TYPE: ICD-10-CM

## 2020-07-15 DIAGNOSIS — K90.3 PANCREATIC COLIPASE DEFICIENCY: ICD-10-CM

## 2020-07-15 DIAGNOSIS — L03.314 CELLULITIS OF GROIN: ICD-10-CM

## 2020-07-15 DIAGNOSIS — J96.01 ACUTE RESPIRATORY FAILURE WITH HYPOXIA (HCC): ICD-10-CM

## 2020-07-15 DIAGNOSIS — J96.01 ACUTE RESPIRATORY FAILURE WITH HYPOXIA (HCC): Primary | ICD-10-CM

## 2020-07-15 DIAGNOSIS — K75.81 NONALCOHOLIC STEATOHEPATITIS: ICD-10-CM

## 2020-07-15 LAB
ALBUMIN SERPL-MCNC: 3.6 G/DL (ref 3.5–5.2)
ALBUMIN/GLOB SERPL: 1 G/DL
ALP SERPL-CCNC: 72 U/L (ref 39–117)
ALT SERPL W P-5'-P-CCNC: 14 U/L (ref 1–33)
ANION GAP SERPL CALCULATED.3IONS-SCNC: 8 MMOL/L (ref 5–15)
AST SERPL-CCNC: 22 U/L (ref 1–32)
BASOPHILS # BLD AUTO: 0.01 10*3/MM3 (ref 0–0.2)
BASOPHILS NFR BLD AUTO: 0.3 % (ref 0–1.5)
BILIRUB SERPL-MCNC: 0.3 MG/DL (ref 0–1.2)
BUN SERPL-MCNC: 6 MG/DL (ref 8–23)
BUN/CREAT SERPL: 10 (ref 7–25)
CALCIUM SPEC-SCNC: 8.8 MG/DL (ref 8.6–10.5)
CHLORIDE SERPL-SCNC: 103 MMOL/L (ref 98–107)
CK SERPL-CCNC: 38 U/L (ref 20–180)
CO2 SERPL-SCNC: 29 MMOL/L (ref 22–29)
CREAT SERPL-MCNC: 0.6 MG/DL (ref 0.57–1)
CRP SERPL-MCNC: 0.19 MG/DL (ref 0–0.5)
DEPRECATED RDW RBC AUTO: 40.4 FL (ref 37–54)
EOSINOPHIL # BLD AUTO: 0.06 10*3/MM3 (ref 0–0.4)
EOSINOPHIL NFR BLD AUTO: 1.6 % (ref 0.3–6.2)
ERYTHROCYTE [DISTWIDTH] IN BLOOD BY AUTOMATED COUNT: 13 % (ref 12.3–15.4)
ERYTHROCYTE [SEDIMENTATION RATE] IN BLOOD: 40 MM/HR (ref 0–30)
GFR SERPL CREATININE-BSD FRML MDRD: 98 ML/MIN/1.73
GLOBULIN UR ELPH-MCNC: 3.6 GM/DL
GLUCOSE SERPL-MCNC: 111 MG/DL (ref 65–99)
HCT VFR BLD AUTO: 34 % (ref 34–46.6)
HGB BLD-MCNC: 11.2 G/DL (ref 12–15.9)
IMM GRANULOCYTES # BLD AUTO: 0.01 10*3/MM3 (ref 0–0.05)
IMM GRANULOCYTES NFR BLD AUTO: 0.3 % (ref 0–0.5)
LYMPHOCYTES # BLD AUTO: 1.17 10*3/MM3 (ref 0.7–3.1)
LYMPHOCYTES NFR BLD AUTO: 31.5 % (ref 19.6–45.3)
MCH RBC QN AUTO: 28.4 PG (ref 26.6–33)
MCHC RBC AUTO-ENTMCNC: 32.9 G/DL (ref 31.5–35.7)
MCV RBC AUTO: 86.3 FL (ref 79–97)
MONOCYTES # BLD AUTO: 0.31 10*3/MM3 (ref 0.1–0.9)
MONOCYTES NFR BLD AUTO: 8.3 % (ref 5–12)
NEUTROPHILS NFR BLD AUTO: 2.16 10*3/MM3 (ref 1.7–7)
NEUTROPHILS NFR BLD AUTO: 58 % (ref 42.7–76)
NRBC BLD AUTO-RTO: 0 /100 WBC (ref 0–0.2)
PLATELET # BLD AUTO: 141 10*3/MM3 (ref 140–450)
PMV BLD AUTO: 12 FL (ref 6–12)
POTASSIUM SERPL-SCNC: 3.7 MMOL/L (ref 3.5–5.2)
PROCALCITONIN SERPL-MCNC: 0.06 NG/ML (ref 0–0.25)
PROT SERPL-MCNC: 7.2 G/DL (ref 6–8.5)
RBC # BLD AUTO: 3.94 10*6/MM3 (ref 3.77–5.28)
SODIUM SERPL-SCNC: 140 MMOL/L (ref 136–145)
WBC # BLD AUTO: 3.72 10*3/MM3 (ref 3.4–10.8)

## 2020-07-15 PROCEDURE — 85025 COMPLETE CBC W/AUTO DIFF WBC: CPT

## 2020-07-15 PROCEDURE — 85652 RBC SED RATE AUTOMATED: CPT

## 2020-07-15 PROCEDURE — 84145 PROCALCITONIN (PCT): CPT

## 2020-07-15 PROCEDURE — 36415 COLL VENOUS BLD VENIPUNCTURE: CPT | Performed by: INTERNAL MEDICINE

## 2020-07-15 PROCEDURE — 80053 COMPREHEN METABOLIC PANEL: CPT | Performed by: INTERNAL MEDICINE

## 2020-07-15 PROCEDURE — 86140 C-REACTIVE PROTEIN: CPT

## 2020-07-15 PROCEDURE — 99024 POSTOP FOLLOW-UP VISIT: CPT | Performed by: PHYSICIAN ASSISTANT

## 2020-07-15 PROCEDURE — 82550 ASSAY OF CK (CPK): CPT

## 2020-07-15 NOTE — TELEPHONE ENCOUNTER
Pt notified that you discussed her case w/ Dr Lara  And that she needs to see her ID Dr Aldair DAVIS. I called his office and they are working her in today at 1:15. Pt was notified of her appointment and that she needs to have labs done at [facility]. Pt verbalized her understanding of her appointment time w/ Dr Quinteros and having labs done at [facility].

## 2020-07-15 NOTE — PROGRESS NOTES
[facility] Bariatric Surgery  [address]  [address]  [facility] [address]  [phone]        Patient Name: Ro Walker  YOB: 1950      Date of Visit: 07/15/2020      Reason for Visit:  postop eval      HPI:  Ro Walker is a 70 y.o. female s/p exploratory laparotomy w/ small bowel resection and preperitoneal left inguinal hernia repair with mesh 6/14/20 [facility]    Hospital course complicated by intraop bleeding - s/p 2u [facility] transfusion, redness/edema of lower abdominal wound/ mons pubis / (L) thigh - resolved w/ abx/Cubicin, (L) common femoral DVT - started on Eliquis.  Discharged on 6/19/20.  Readmitted on 6/21/20 w/ GIB, likely anastomotic - stopped /stable w/ resumed anticoagulation.  Discharged 6/25/20.      Following w/ ID.  Says completed IV abx, now on PO abx, although does not have an updated med list.  Reports redness of mons pubis has persisted.  Has f/up scheduled w/ ID on Monday.      Otherwise she has no complaints.  No fevers.  Eating well.  Bowels moving.  No further bleeding.      Of note, has not seen PCP since hospital discharge.  Continues on Eliquis, but says RX almost gone.          Past Medical History:   Diagnosis Date   • Cervical cancer (CMS/HCC)    • Lupus (CMS/HCC)    • Polyp of vocal cord      Past Surgical History:   Procedure Laterality Date   • CATARACT EXTRACTION  2008   • CERVICAL CONE BIOPSY     • CERVICAL CONE BIOPSY     • COLON RESECTION SMALL BOWEL N/A 6/13/2020    Procedure: COLON RESECTION SMALL BOWEL;  Surgeon: Huy Lara MD;  Location: [facility] OR;  Service: General;  Laterality: N/A;   • COLONOSCOPY N/A 6/23/2020    Procedure: COLONOSCOPY;  Surgeon: Kareem Feldman MD;  Location:  [facility];  Service: Gastroenterology;  Laterality: N/A;   • ENDOSCOPY Left 6/22/2020    Procedure: ESOPHAGOGASTRODUODENOSCOPY;  Surgeon: Kareem Feldman MD;  Location:  [facility];  Service: Gastroenterology;  Laterality: Left;    • INGUINAL HERNIA REPAIR Left 6/13/2020    Procedure: INGUINAL HERNIA REPAIR LEFT;  Surgeon: Huy Lara MD;  Location:  [facility];  Service: General;  Laterality: Left;   • LAPAROSCOPIC ASSISTED VAGINAL HYSTERECTOMY N/A 2/26/2018    Procedure: LAPAROSCOPIC ASSISTED VAGINAL HYSTERECTOMY, BILATERAL SALPINGO OOPHORECTOMY;  Surgeon: María Dietrich MD;  Location:  [facility];  Service:    • RETINAL DETACHMENT REPAIR  2006   • THROAT SURGERY  2017   • TUBAL ABDOMINAL LIGATION       Outpatient Medications Marked as Taking for the 7/15/20 encounter (Office Visit) with Francisca Daniel PA   Medication Sig Dispense Refill   • acetaminophen (TYLENOL) 325 MG tablet Take 2 tablets by mouth Every 4 (Four) Hours As Needed for Mild Pain .     • apixaban (ELIQUIS) 5 MG tablet tablet Start 6/24/20 - Take 1 tablet by mouth Every 12 (Twelve) Hours for DVT/PE (active thrombosis). Bottle 2. 60 tablet 2   • cholecalciferol (VITAMIN D3) 1000 units tablet Take 1,000 Units by mouth Daily.     • docusate sodium (COLACE) 250 MG capsule Take 1 capsule by mouth 2 (Two) Times a Day. 60 capsule 2   • hydroxychloroquine (PLAQUENIL) 200 MG tablet Take 2 tablets by mouth Daily for 30 doses. Indications: Systemic Lupus Erythematosus     • lactobacillus acidophilus (RISAQUAD) capsule capsule Take 1 capsule by mouth Daily. 30 capsule 0   • pantoprazole (PROTONIX) 40 MG EC tablet Take 1 tablet by mouth Daily. 30 tablet 0     No Known Allergies    Social History     Socioeconomic History   • Marital status:      Spouse name: Not on file   • Number of children: 4   • Years of education: Not on file   • Highest education level: Not on file   Tobacco Use   • Smoking status: Current Every Day Smoker     Packs/day: 0.50     Types: Cigarettes   • Smokeless tobacco: Never Used   • Tobacco comment: Has not smoked x3 days   Substance and Sexual Activity   • Alcohol use: No   • Drug use: No   • Sexual activity: Never       Vitals:    07/15/20  0914   BP: 120/76   Pulse: 74   Resp: 18   Temp: 98 °F (36.7 °C)   SpO2: 98%     Weight 45.4 kg (100 lb)  Body mass index is 18.89 kg/m².    Physical Exam   Constitutional: She appears well-developed and well-nourished. She is cooperative.   HENT:   wearing a mask   Eyes: Conjunctivae are normal. No scleral icterus.   Cardiovascular: Normal rate.   Pulmonary/Chest: Effort normal.   Abdominal: Soft. She exhibits no distension. There is no tenderness. There is no guarding.   lower abdominal midline incision well healed, no hernia, redness of mons pubis noted   Musculoskeletal: Normal range of motion. She exhibits no edema.   Neurological: She is alert.   Skin: Skin is warm and dry. No rash noted.   Psychiatric: She has a normal mood and affect. Judgment normal.         Assessment:  4 weeks s/p exploratory laparotomy w/ small bowel resection and preperitoneal left inguinal hernia repair with mesh 6/14/20 w/ Dr. Lara    ICD-10-CM ICD-9-CM   1. Cellulitis of pubic region L03.319 682.2   2. Deep vein thrombosis (DVT) of femoral vein of left lower extremity, unspecified chronicity (CMS/HCC) I82.412 453.41   3. Anemia, unspecified type D64.9 285.9         Plan:  Discussed w/ Dr. Lara.  Instructed to f/up with ID ASAP - called office and worked in for 1:15 today.  Labs ordered - CBC, CRP, ESR, procalcitonin.  Continue abx per ID.  Continue Eliquis - full anticoagulation x 3 months - will RX.  Will schedule repeat Duplex LLE (including femoral vein) end of September.  Follow up w/ PCP as advised.  Continue w/ lifting restrictions x 2 more weeks.  Further input to follow.        Addendum:  ID notes reviewed.  Continue Doxy 100mg BID x 2 wks.  ID f/up in 2 weeks.

## 2020-07-20 NOTE — TELEPHONE ENCOUNTER
Pt notified that a refill of Eliquis has been sent to her pharmacy, that it is very important that she continue on that medication twice daily x2 more months, and that she will have a repeat LE duplex scan at the end of Sept to reevaluate. Pt verbalized her understanding that she needs to continue the Eliquis for x2 months and that she needs to have the LE duplex scan at the end of Sept. Pt states that central scheduling had called her on Friday, pt states she will call Central scheduling and get it scheduled for the end of Sept.

## 2020-07-22 ENCOUNTER — TELEPHONE (OUTPATIENT)
Dept: BARIATRICS/WEIGHT MGMT | Facility: CLINIC | Age: 70
End: 2020-07-22

## 2020-07-23 RX ORDER — PANTOPRAZOLE SODIUM 40 MG/1
40 TABLET, DELAYED RELEASE ORAL DAILY
Qty: 90 TABLET | Refills: 0 | Status: SHIPPED | OUTPATIENT
Start: 2020-07-23 | End: 2022-11-21

## 2020-09-28 ENCOUNTER — HOSPITAL ENCOUNTER (OUTPATIENT)
Dept: ULTRASOUND IMAGING | Facility: HOSPITAL | Age: 70
Discharge: HOME | End: 2020-09-28
Admitting: PHYSICIAN ASSISTANT

## 2020-09-28 DIAGNOSIS — I82.412 DEEP VEIN THROMBOSIS (DVT) OF FEMORAL VEIN OF LEFT LOWER EXTREMITY, UNSPECIFIED CHRONICITY (HCC): ICD-10-CM

## 2020-09-28 PROCEDURE — 93971 EXTREMITY STUDY: CPT

## 2020-10-13 ENCOUNTER — TELEPHONE (OUTPATIENT)
Dept: BARIATRICS/WEIGHT MGMT | Facility: CLINIC | Age: 70
End: 2020-10-13

## 2020-10-13 NOTE — TELEPHONE ENCOUNTER
"----- Message from Huy Lara MD sent at 10/9/2020  7:31 AM EDT -----    No, nothing further from a general surgery standpoint.  We will see her again prn, thanks!    ----- Message -----  From: Francisca Daniel PA  Sent: 10/8/2020   4:04 PM EDT  To: Huy Lara MD    Please review:     70 y.o. female s/p exploratory laparotomy w/ small bowel resection and preperitoneal left inguinal hernia repair with mesh 6/14/20 [facility]     Hospital course complicated by intraop bleeding (s/p 2u [facility] transfusion), redness/edema of lower abdominal wound/ mons pubis / (L) thigh (resolved w/ abx/Cubicin) and (L) common femoral DVT (started on Eliquis).  Discharged on 6/19/20.  Readmitted on 6/21/20 w/ GIB, likely anastomotic - stopped /stable w/ resumed anticoagulation.  Discharged 6/25/20.      LOV 7/15/20 - reported persistent redness of mons pubis, advised to f/up with ID ASAP.  Continue abx per ID.  Continue Eliquis - full anticoagulation x 3 months.  Planned repeat Duplex LLE (including femoral vein) end of September.      (L)LE Duplex 9/28/20 @[facility] - negative.  Patient says she is feeling well - \"no issues\".  Finished Eliquis 4 days ago.  Saw ID 3-4 wks ago - will have to request records if needed.       Anything further needed from a surgical standpoint?  Says she has not seen her PCP in >1 yr.         "

## 2020-10-13 NOTE — TELEPHONE ENCOUNTER
Notified pt that her LE Duplex was negative and there was no evidence of a blood clot.  I let her know that nothing further needed from a surgical standpoint w/ us, and to follow up as needed. Pt verbalized understanding.

## 2022-10-14 DIAGNOSIS — Z00.6 EXAMINATION FOR NORMAL COMPARISON FOR CLINICAL RESEARCH: Primary | ICD-10-CM

## 2022-10-31 ENCOUNTER — OFFICE VISIT (OUTPATIENT)
Dept: CARDIAC SURGERY | Facility: CLINIC | Age: 72
End: 2022-10-31

## 2022-10-31 VITALS
OXYGEN SATURATION: 98 % | DIASTOLIC BLOOD PRESSURE: 86 MMHG | WEIGHT: 108 LBS | BODY MASS INDEX: 20.39 KG/M2 | HEART RATE: 78 BPM | TEMPERATURE: 98.8 F | HEIGHT: 61 IN | SYSTOLIC BLOOD PRESSURE: 144 MMHG

## 2022-10-31 DIAGNOSIS — I71.43 INFRARENAL ABDOMINAL AORTIC ANEURYSM (AAA) WITHOUT RUPTURE: Primary | ICD-10-CM

## 2022-10-31 PROCEDURE — 99205 OFFICE O/P NEW HI 60 MIN: CPT | Performed by: THORACIC SURGERY (CARDIOTHORACIC VASCULAR SURGERY)

## 2022-10-31 RX ORDER — HYDROXYCHLOROQUINE SULFATE 200 MG/1
200 TABLET, FILM COATED ORAL DAILY
COMMUNITY
Start: 2022-09-08

## 2022-10-31 RX ORDER — ALENDRONATE SODIUM 70 MG/1
70 TABLET ORAL
COMMUNITY

## 2022-10-31 RX ORDER — LATANOPROST 50 UG/ML
1 SOLUTION/ DROPS OPHTHALMIC NIGHTLY
COMMUNITY
End: 2022-12-15

## 2022-11-01 DIAGNOSIS — I71.40 ABDOMINAL AORTIC ANEURYSM (AAA) WITHOUT RUPTURE, UNSPECIFIED PART: ICD-10-CM

## 2022-11-01 DIAGNOSIS — Z00.6 EXAMINATION FOR NORMAL COMPARISON FOR CLINICAL RESEARCH: Primary | ICD-10-CM

## 2022-11-01 PROBLEM — I71.43 INFRARENAL ABDOMINAL AORTIC ANEURYSM (AAA) WITHOUT RUPTURE: Status: ACTIVE | Noted: 2022-11-01

## 2022-11-16 ENCOUNTER — PREP FOR SURGERY (OUTPATIENT)
Dept: OTHER | Facility: HOSPITAL | Age: 72
End: 2022-11-16

## 2022-11-16 DIAGNOSIS — I71.43 INFRARENAL ABDOMINAL AORTIC ANEURYSM (AAA) WITHOUT RUPTURE: Primary | ICD-10-CM

## 2022-11-17 RX ORDER — CEFAZOLIN SODIUM 2 G/100ML
2 INJECTION, SOLUTION INTRAVENOUS ONCE
Status: CANCELLED | OUTPATIENT
Start: 2022-11-22 | End: 2022-11-22

## 2022-11-21 ENCOUNTER — HOSPITAL ENCOUNTER (OUTPATIENT)
Dept: GENERAL RADIOLOGY | Facility: HOSPITAL | Age: 72
Discharge: HOME | End: 2022-11-21

## 2022-11-21 ENCOUNTER — PRE-ADMISSION TESTING (OUTPATIENT)
Dept: PREADMISSION TESTING | Facility: HOSPITAL | Age: 72
End: 2022-11-21

## 2022-11-21 VITALS — WEIGHT: 108.03 LBS | OXYGEN SATURATION: 98 % | HEIGHT: 61 IN | BODY MASS INDEX: 20.4 KG/M2

## 2022-11-21 DIAGNOSIS — I71.43 INFRARENAL ABDOMINAL AORTIC ANEURYSM (AAA) WITHOUT RUPTURE: ICD-10-CM

## 2022-11-21 LAB
ABO GROUP BLD: NORMAL
ANION GAP SERPL CALCULATED.3IONS-SCNC: 9 MMOL/L (ref 5–15)
APTT PPP: 25.8 SECONDS (ref 22–39)
BASOPHILS # BLD AUTO: 0.02 10*3/MM3 (ref 0–0.2)
BASOPHILS NFR BLD AUTO: 0.4 % (ref 0–1.5)
BLD GP AB SCN SERPL QL: NEGATIVE
BUN SERPL-MCNC: 9 MG/DL (ref 8–23)
BUN/CREAT SERPL: 14.1 (ref 7–25)
CALCIUM SPEC-SCNC: 9.5 MG/DL (ref 8.6–10.5)
CHLORIDE SERPL-SCNC: 103 MMOL/L (ref 98–107)
CO2 SERPL-SCNC: 28 MMOL/L (ref 22–29)
CREAT SERPL-MCNC: 0.64 MG/DL (ref 0.57–1)
DEPRECATED RDW RBC AUTO: 45.3 FL (ref 37–54)
EGFRCR SERPLBLD CKD-EPI 2021: 92.9 ML/MIN/1.73
EOSINOPHIL # BLD AUTO: 0.03 10*3/MM3 (ref 0–0.4)
EOSINOPHIL NFR BLD AUTO: 0.6 % (ref 0.3–6.2)
ERYTHROCYTE [DISTWIDTH] IN BLOOD BY AUTOMATED COUNT: 13.4 % (ref 12.3–15.4)
GLUCOSE SERPL-MCNC: 80 MG/DL (ref 65–99)
HBA1C MFR BLD: 5.3 % (ref 4.8–5.6)
HCT VFR BLD AUTO: 39 % (ref 34–46.6)
HGB BLD-MCNC: 12.4 G/DL (ref 12–15.9)
IMM GRANULOCYTES # BLD AUTO: 0.01 10*3/MM3 (ref 0–0.05)
IMM GRANULOCYTES NFR BLD AUTO: 0.2 % (ref 0–0.5)
INR PPP: 0.96 (ref 0.84–1.13)
LYMPHOCYTES # BLD AUTO: 1.34 10*3/MM3 (ref 0.7–3.1)
LYMPHOCYTES NFR BLD AUTO: 26.6 % (ref 19.6–45.3)
MCH RBC QN AUTO: 29.2 PG (ref 26.6–33)
MCHC RBC AUTO-ENTMCNC: 31.8 G/DL (ref 31.5–35.7)
MCV RBC AUTO: 92 FL (ref 79–97)
MONOCYTES # BLD AUTO: 0.32 10*3/MM3 (ref 0.1–0.9)
MONOCYTES NFR BLD AUTO: 6.3 % (ref 5–12)
NEUTROPHILS NFR BLD AUTO: 3.32 10*3/MM3 (ref 1.7–7)
NEUTROPHILS NFR BLD AUTO: 65.9 % (ref 42.7–76)
NRBC BLD AUTO-RTO: 0 /100 WBC (ref 0–0.2)
PLATELET # BLD AUTO: 111 10*3/MM3 (ref 140–450)
PMV BLD AUTO: 12.4 FL (ref 6–12)
POTASSIUM SERPL-SCNC: 3.9 MMOL/L (ref 3.5–5.2)
PROTHROMBIN TIME: 12.7 SECONDS (ref 11.4–14.4)
QT INTERVAL: 388 MS
QTC INTERVAL: 412 MS
RBC # BLD AUTO: 4.24 10*6/MM3 (ref 3.77–5.28)
RH BLD: POSITIVE
SODIUM SERPL-SCNC: 140 MMOL/L (ref 136–145)
T&S EXPIRATION DATE: NORMAL
WBC NRBC COR # BLD: 5.04 10*3/MM3 (ref 3.4–10.8)

## 2022-11-21 PROCEDURE — 86900 BLOOD TYPING SEROLOGIC ABO: CPT

## 2022-11-21 PROCEDURE — 86901 BLOOD TYPING SEROLOGIC RH(D): CPT

## 2022-11-21 PROCEDURE — 71046 X-RAY EXAM CHEST 2 VIEWS: CPT

## 2022-11-21 PROCEDURE — 83036 HEMOGLOBIN GLYCOSYLATED A1C: CPT

## 2022-11-21 PROCEDURE — 85025 COMPLETE CBC W/AUTO DIFF WBC: CPT

## 2022-11-21 PROCEDURE — 80048 BASIC METABOLIC PNL TOTAL CA: CPT

## 2022-11-21 PROCEDURE — 86850 RBC ANTIBODY SCREEN: CPT

## 2022-11-21 PROCEDURE — 85730 THROMBOPLASTIN TIME PARTIAL: CPT

## 2022-11-21 PROCEDURE — 85610 PROTHROMBIN TIME: CPT

## 2022-11-21 PROCEDURE — 93005 ELECTROCARDIOGRAM TRACING: CPT

## 2022-11-21 PROCEDURE — 86923 COMPATIBILITY TEST ELECTRIC: CPT

## 2022-11-21 PROCEDURE — 93010 ELECTROCARDIOGRAM REPORT: CPT | Performed by: INTERNAL MEDICINE

## 2022-11-21 PROCEDURE — 36415 COLL VENOUS BLD VENIPUNCTURE: CPT

## 2022-11-22 ENCOUNTER — ANESTHESIA (OUTPATIENT)
Dept: PERIOP | Facility: HOSPITAL | Age: 72
End: 2022-11-22

## 2022-11-22 ENCOUNTER — HOSPITAL ENCOUNTER (INPATIENT)
Facility: HOSPITAL | Age: 72
LOS: 1 days | Discharge: HOME | End: 2022-11-23
Attending: THORACIC SURGERY (CARDIOTHORACIC VASCULAR SURGERY) | Admitting: THORACIC SURGERY (CARDIOTHORACIC VASCULAR SURGERY)

## 2022-11-22 ENCOUNTER — ANESTHESIA EVENT (OUTPATIENT)
Dept: PERIOP | Facility: HOSPITAL | Age: 72
End: 2022-11-22

## 2022-11-22 ENCOUNTER — APPOINTMENT (OUTPATIENT)
Dept: GENERAL RADIOLOGY | Facility: HOSPITAL | Age: 72
End: 2022-11-22

## 2022-11-22 DIAGNOSIS — I71.43 INFRARENAL ABDOMINAL AORTIC ANEURYSM (AAA) WITHOUT RUPTURE: ICD-10-CM

## 2022-11-22 DIAGNOSIS — I71.40 ABDOMINAL AORTIC ANEURYSM (AAA) WITHOUT RUPTURE, UNSPECIFIED PART: ICD-10-CM

## 2022-11-22 PROCEDURE — C1889 IMPLANT/INSERT DEVICE, NOC: HCPCS | Performed by: THORACIC SURGERY (CARDIOTHORACIC VASCULAR SURGERY)

## 2022-11-22 PROCEDURE — 34713 PERQ ACCESS & CLSR FEM ART: CPT | Performed by: THORACIC SURGERY (CARDIOTHORACIC VASCULAR SURGERY)

## 2022-11-22 PROCEDURE — 25010000002 CEFAZOLIN IN DEXTROSE 2-4 GM/100ML-% SOLUTION

## 2022-11-22 PROCEDURE — B4101ZZ FLUOROSCOPY OF ABDOMINAL AORTA USING LOW OSMOLAR CONTRAST: ICD-10-PCS | Performed by: THORACIC SURGERY (CARDIOTHORACIC VASCULAR SURGERY)

## 2022-11-22 PROCEDURE — C1894 INTRO/SHEATH, NON-LASER: HCPCS | Performed by: THORACIC SURGERY (CARDIOTHORACIC VASCULAR SURGERY)

## 2022-11-22 PROCEDURE — 25010000002 DEXAMETHASONE PER 1 MG: Performed by: NURSE ANESTHETIST, CERTIFIED REGISTERED

## 2022-11-22 PROCEDURE — 25010000002 HEPARIN (PORCINE) PER 1000 UNITS: Performed by: THORACIC SURGERY (CARDIOTHORACIC VASCULAR SURGERY)

## 2022-11-22 PROCEDURE — 86900 BLOOD TYPING SEROLOGIC ABO: CPT

## 2022-11-22 PROCEDURE — C1769 GUIDE WIRE: HCPCS | Performed by: THORACIC SURGERY (CARDIOTHORACIC VASCULAR SURGERY)

## 2022-11-22 PROCEDURE — 04CL3ZZ EXTIRPATION OF MATTER FROM LEFT FEMORAL ARTERY, PERCUTANEOUS APPROACH: ICD-10-PCS | Performed by: THORACIC SURGERY (CARDIOTHORACIC VASCULAR SURGERY)

## 2022-11-22 PROCEDURE — 25010000002 ONDANSETRON PER 1 MG: Performed by: NURSE ANESTHETIST, CERTIFIED REGISTERED

## 2022-11-22 PROCEDURE — 34705 EVAC RPR A-BIILIAC NDGFT: CPT | Performed by: THORACIC SURGERY (CARDIOTHORACIC VASCULAR SURGERY)

## 2022-11-22 PROCEDURE — 35371 RECHANNELING OF ARTERY: CPT | Performed by: THORACIC SURGERY (CARDIOTHORACIC VASCULAR SURGERY)

## 2022-11-22 PROCEDURE — 25010000002 FENTANYL CITRATE (PF) 100 MCG/2ML SOLUTION: Performed by: NURSE ANESTHETIST, CERTIFIED REGISTERED

## 2022-11-22 PROCEDURE — 88311 DECALCIFY TISSUE: CPT | Performed by: THORACIC SURGERY (CARDIOTHORACIC VASCULAR SURGERY)

## 2022-11-22 PROCEDURE — C1768 GRAFT, VASCULAR: HCPCS | Performed by: THORACIC SURGERY (CARDIOTHORACIC VASCULAR SURGERY)

## 2022-11-22 PROCEDURE — 88304 TISSUE EXAM BY PATHOLOGIST: CPT | Performed by: THORACIC SURGERY (CARDIOTHORACIC VASCULAR SURGERY)

## 2022-11-22 PROCEDURE — C2628 CATHETER, OCCLUSION: HCPCS | Performed by: THORACIC SURGERY (CARDIOTHORACIC VASCULAR SURGERY)

## 2022-11-22 PROCEDURE — 25010000002 FENTANYL CITRATE (PF) 50 MCG/ML SOLUTION

## 2022-11-22 PROCEDURE — 25010000002 MORPHINE PER 10 MG: Performed by: THORACIC SURGERY (CARDIOTHORACIC VASCULAR SURGERY)

## 2022-11-22 PROCEDURE — 25010000002 ALBUMIN HUMAN 5% PER 50 ML: Performed by: NURSE ANESTHETIST, CERTIFIED REGISTERED

## 2022-11-22 PROCEDURE — P9016 RBC LEUKOCYTES REDUCED: HCPCS

## 2022-11-22 PROCEDURE — 36430 TRANSFUSION BLD/BLD COMPNT: CPT

## 2022-11-22 PROCEDURE — 99222 1ST HOSP IP/OBS MODERATE 55: CPT | Performed by: INTERNAL MEDICINE

## 2022-11-22 PROCEDURE — P9041 ALBUMIN (HUMAN),5%, 50ML: HCPCS | Performed by: NURSE ANESTHETIST, CERTIFIED REGISTERED

## 2022-11-22 PROCEDURE — C1887 CATHETER, GUIDING: HCPCS | Performed by: THORACIC SURGERY (CARDIOTHORACIC VASCULAR SURGERY)

## 2022-11-22 PROCEDURE — 04UL3KZ SUPPLEMENT LEFT FEMORAL ARTERY WITH NONAUTOLOGOUS TISSUE SUBSTITUTE, PERCUTANEOUS APPROACH: ICD-10-PCS | Performed by: THORACIC SURGERY (CARDIOTHORACIC VASCULAR SURGERY)

## 2022-11-22 PROCEDURE — 25010000002 HEPARIN (PORCINE) PER 1000 UNITS: Performed by: NURSE ANESTHETIST, CERTIFIED REGISTERED

## 2022-11-22 PROCEDURE — C1760 CLOSURE DEV, VASC: HCPCS | Performed by: THORACIC SURGERY (CARDIOTHORACIC VASCULAR SURGERY)

## 2022-11-22 PROCEDURE — 25010000002 CEFAZOLIN IN DEXTROSE 2-4 GM/100ML-% SOLUTION: Performed by: PHYSICIAN ASSISTANT

## 2022-11-22 PROCEDURE — 25010000002 IOPAMIDOL 61 % SOLUTION: Performed by: THORACIC SURGERY (CARDIOTHORACIC VASCULAR SURGERY)

## 2022-11-22 PROCEDURE — 25010000002 ONDANSETRON PER 1 MG: Performed by: PHYSICIAN ASSISTANT

## 2022-11-22 PROCEDURE — 04V03DZ RESTRICTION OF ABDOMINAL AORTA WITH INTRALUMINAL DEVICE, PERCUTANEOUS APPROACH: ICD-10-PCS | Performed by: THORACIC SURGERY (CARDIOTHORACIC VASCULAR SURGERY)

## 2022-11-22 PROCEDURE — 25010000002 PROTAMINE SULFATE PER 10 MG: Performed by: NURSE ANESTHETIST, CERTIFIED REGISTERED

## 2022-11-22 PROCEDURE — 25010000002 PROPOFOL 10 MG/ML EMULSION: Performed by: NURSE ANESTHETIST, CERTIFIED REGISTERED

## 2022-11-22 DEVICE — EXCLUDER AAA ENDO CONTRA LEG 16MMX12MMX12CM 12FR
Type: IMPLANTABLE DEVICE | Site: AORTA | Status: FUNCTIONAL
Brand: GORE EXCLUDER AAA ENDOPROSTHESIS

## 2022-11-22 DEVICE — EXCLUDER AAA ENDO TRNK IPSI 23MMX14.5MMX12CM 16FR
Type: IMPLANTABLE DEVICE | Site: AORTA | Status: FUNCTIONAL
Brand: GORE EXCLUDER AAA ENDOPROSTHESIS WITH C3 DELIVERY

## 2022-11-22 DEVICE — VASCU-GUARD PERIPHERAL VASCULAR PATCH IS PREPARED FROM BOVINE PERICARDIUM WHICH IS CROSS-LINKED WITH GLUTARALDEHYDE. THE PERICARDIUM IS PROCURED FROM CATTLE ORIGINATING IN THE UNITED STATES. VASCU-GUARD PERIPHERAL VASCULAR PATCH IS CHEMICALLY STERILIZED USING ETHANOL AND PROPYLENE OXIDE. VASCU-GUARD PERIPHERAL VASCULAR PATCH HAS BEEN TREATED WITH 1 MOLAR SODIUM HYDROXIDE FOR A MINIMUM OF 60 MINUTES AT 20 - 25 C.  VASCU-GUARD PERIPHERAL VASCULAR PATCH IS PACKAGED IN A CONTAINER FILLED WITH STERILE, NON-PYROGENIC WATER CONTAINING PROPYLENE OXIDE. THE CONTENTS OF THE UNOPENED, UNDAMAGED CONTAINER ARE STERILE.
Type: IMPLANTABLE DEVICE | Site: AORTA | Status: FUNCTIONAL
Brand: VASCU-GUARD PERIPHERAL VASCULAR PATCH

## 2022-11-22 RX ORDER — LIDOCAINE HYDROCHLORIDE 10 MG/ML
INJECTION, SOLUTION EPIDURAL; INFILTRATION; INTRACAUDAL; PERINEURAL AS NEEDED
Status: DISCONTINUED | OUTPATIENT
Start: 2022-11-22 | End: 2022-11-22 | Stop reason: SURG

## 2022-11-22 RX ORDER — HYDROCODONE BITARTRATE AND ACETAMINOPHEN 7.5; 325 MG/1; MG/1
TABLET ORAL
Status: COMPLETED
Start: 2022-11-22 | End: 2022-11-22

## 2022-11-22 RX ORDER — NICARDIPINE HYDROCHLORIDE 2.5 MG/ML
INJECTION INTRAVENOUS CONTINUOUS PRN
Status: DISCONTINUED | OUTPATIENT
Start: 2022-11-22 | End: 2022-11-22 | Stop reason: SURG

## 2022-11-22 RX ORDER — LIDOCAINE HYDROCHLORIDE 10 MG/ML
0.5 INJECTION, SOLUTION EPIDURAL; INFILTRATION; INTRACAUDAL; PERINEURAL ONCE AS NEEDED
Status: DISCONTINUED | OUTPATIENT
Start: 2022-11-22 | End: 2022-11-22 | Stop reason: HOSPADM

## 2022-11-22 RX ORDER — HYDROXYCHLOROQUINE SULFATE 200 MG/1
200 TABLET, FILM COATED ORAL 2 TIMES DAILY
Status: DISCONTINUED | OUTPATIENT
Start: 2022-11-22 | End: 2022-11-23 | Stop reason: HOSPADM

## 2022-11-22 RX ORDER — HYDROMORPHONE HYDROCHLORIDE 1 MG/ML
0.5 INJECTION, SOLUTION INTRAMUSCULAR; INTRAVENOUS; SUBCUTANEOUS
Status: DISCONTINUED | OUTPATIENT
Start: 2022-11-22 | End: 2022-11-22 | Stop reason: HOSPADM

## 2022-11-22 RX ORDER — BUPIVACAINE HCL/0.9 % NACL/PF 0.125 %
PLASTIC BAG, INJECTION (ML) EPIDURAL AS NEEDED
Status: DISCONTINUED | OUTPATIENT
Start: 2022-11-22 | End: 2022-11-22 | Stop reason: SURG

## 2022-11-22 RX ORDER — FAMOTIDINE 10 MG/ML
20 INJECTION, SOLUTION INTRAVENOUS ONCE
Status: DISCONTINUED | OUTPATIENT
Start: 2022-11-22 | End: 2022-11-22 | Stop reason: HOSPADM

## 2022-11-22 RX ORDER — MAGNESIUM HYDROXIDE 1200 MG/15ML
LIQUID ORAL AS NEEDED
Status: DISCONTINUED | OUTPATIENT
Start: 2022-11-22 | End: 2022-11-22 | Stop reason: HOSPADM

## 2022-11-22 RX ORDER — ONDANSETRON 2 MG/ML
INJECTION INTRAMUSCULAR; INTRAVENOUS AS NEEDED
Status: DISCONTINUED | OUTPATIENT
Start: 2022-11-22 | End: 2022-11-22 | Stop reason: SURG

## 2022-11-22 RX ORDER — FAMOTIDINE 20 MG/1
20 TABLET, FILM COATED ORAL ONCE
Status: DISCONTINUED | OUTPATIENT
Start: 2022-11-22 | End: 2022-11-22 | Stop reason: HOSPADM

## 2022-11-22 RX ORDER — CEFAZOLIN SODIUM 2 G/100ML
2 INJECTION, SOLUTION INTRAVENOUS EVERY 8 HOURS
Status: COMPLETED | OUTPATIENT
Start: 2022-11-22 | End: 2022-11-22

## 2022-11-22 RX ORDER — SODIUM CHLORIDE 0.9 % (FLUSH) 0.9 %
10 SYRINGE (ML) INJECTION AS NEEDED
Status: DISCONTINUED | OUTPATIENT
Start: 2022-11-22 | End: 2022-11-22 | Stop reason: HOSPADM

## 2022-11-22 RX ORDER — PROTAMINE SULFATE 10 MG/ML
INJECTION, SOLUTION INTRAVENOUS AS NEEDED
Status: DISCONTINUED | OUTPATIENT
Start: 2022-11-22 | End: 2022-11-22 | Stop reason: SURG

## 2022-11-22 RX ORDER — DEXAMETHASONE SODIUM PHOSPHATE 4 MG/ML
INJECTION, SOLUTION INTRA-ARTICULAR; INTRALESIONAL; INTRAMUSCULAR; INTRAVENOUS; SOFT TISSUE AS NEEDED
Status: DISCONTINUED | OUTPATIENT
Start: 2022-11-22 | End: 2022-11-22 | Stop reason: SURG

## 2022-11-22 RX ORDER — FENTANYL CITRATE 50 UG/ML
50 INJECTION, SOLUTION INTRAMUSCULAR; INTRAVENOUS
Status: DISCONTINUED | OUTPATIENT
Start: 2022-11-22 | End: 2022-11-22 | Stop reason: HOSPADM

## 2022-11-22 RX ORDER — GLYCOPYRROLATE 0.2 MG/ML
INJECTION INTRAMUSCULAR; INTRAVENOUS AS NEEDED
Status: DISCONTINUED | OUTPATIENT
Start: 2022-11-22 | End: 2022-11-22 | Stop reason: SURG

## 2022-11-22 RX ORDER — AMOXICILLIN 250 MG
2 CAPSULE ORAL 2 TIMES DAILY PRN
Status: DISCONTINUED | OUTPATIENT
Start: 2022-11-22 | End: 2022-11-23 | Stop reason: HOSPADM

## 2022-11-22 RX ORDER — FENTANYL CITRATE 50 UG/ML
INJECTION, SOLUTION INTRAMUSCULAR; INTRAVENOUS
Status: COMPLETED
Start: 2022-11-22 | End: 2022-11-22

## 2022-11-22 RX ORDER — CLOPIDOGREL BISULFATE 75 MG/1
75 TABLET ORAL DAILY
Status: DISCONTINUED | OUTPATIENT
Start: 2022-11-23 | End: 2022-11-23 | Stop reason: HOSPADM

## 2022-11-22 RX ORDER — LABETALOL HYDROCHLORIDE 5 MG/ML
INJECTION, SOLUTION INTRAVENOUS AS NEEDED
Status: DISCONTINUED | OUTPATIENT
Start: 2022-11-22 | End: 2022-11-22 | Stop reason: SURG

## 2022-11-22 RX ORDER — MORPHINE SULFATE 2 MG/ML
2 INJECTION, SOLUTION INTRAMUSCULAR; INTRAVENOUS
Status: DISCONTINUED | OUTPATIENT
Start: 2022-11-22 | End: 2022-11-23 | Stop reason: HOSPADM

## 2022-11-22 RX ORDER — ASPIRIN 81 MG/1
81 TABLET ORAL DAILY
Status: DISCONTINUED | OUTPATIENT
Start: 2022-11-23 | End: 2022-11-23 | Stop reason: HOSPADM

## 2022-11-22 RX ORDER — PROPOFOL 10 MG/ML
VIAL (ML) INTRAVENOUS AS NEEDED
Status: DISCONTINUED | OUTPATIENT
Start: 2022-11-22 | End: 2022-11-22 | Stop reason: SURG

## 2022-11-22 RX ORDER — CEFAZOLIN SODIUM 2 G/100ML
2 INJECTION, SOLUTION INTRAVENOUS ONCE
Status: COMPLETED | OUTPATIENT
Start: 2022-11-22 | End: 2022-11-22

## 2022-11-22 RX ORDER — HYDROCODONE BITARTRATE AND ACETAMINOPHEN 7.5; 325 MG/1; MG/1
1 TABLET ORAL EVERY 4 HOURS PRN
Status: DISCONTINUED | OUTPATIENT
Start: 2022-11-22 | End: 2022-11-23 | Stop reason: HOSPADM

## 2022-11-22 RX ORDER — HEPARIN SODIUM 1000 [USP'U]/ML
INJECTION, SOLUTION INTRAVENOUS; SUBCUTANEOUS AS NEEDED
Status: DISCONTINUED | OUTPATIENT
Start: 2022-11-22 | End: 2022-11-22 | Stop reason: SURG

## 2022-11-22 RX ORDER — MIDAZOLAM HYDROCHLORIDE 1 MG/ML
0.5 INJECTION INTRAMUSCULAR; INTRAVENOUS
Status: DISCONTINUED | OUTPATIENT
Start: 2022-11-22 | End: 2022-11-22 | Stop reason: HOSPADM

## 2022-11-22 RX ORDER — ROCURONIUM BROMIDE 10 MG/ML
INJECTION, SOLUTION INTRAVENOUS AS NEEDED
Status: DISCONTINUED | OUTPATIENT
Start: 2022-11-22 | End: 2022-11-22 | Stop reason: SURG

## 2022-11-22 RX ORDER — SODIUM CHLORIDE, SODIUM LACTATE, POTASSIUM CHLORIDE, CALCIUM CHLORIDE 600; 310; 30; 20 MG/100ML; MG/100ML; MG/100ML; MG/100ML
9 INJECTION, SOLUTION INTRAVENOUS CONTINUOUS PRN
Status: DISCONTINUED | OUTPATIENT
Start: 2022-11-22 | End: 2022-11-23 | Stop reason: HOSPADM

## 2022-11-22 RX ORDER — LIDOCAINE HYDROCHLORIDE 10 MG/ML
0.5 INJECTION, SOLUTION EPIDURAL; INFILTRATION; INTRACAUDAL; PERINEURAL ONCE AS NEEDED
Status: COMPLETED | OUTPATIENT
Start: 2022-11-22 | End: 2022-11-22

## 2022-11-22 RX ORDER — ALBUMIN, HUMAN INJ 5% 5 %
SOLUTION INTRAVENOUS CONTINUOUS PRN
Status: DISCONTINUED | OUTPATIENT
Start: 2022-11-22 | End: 2022-11-22 | Stop reason: SURG

## 2022-11-22 RX ORDER — SODIUM CHLORIDE 9 MG/ML
40 INJECTION, SOLUTION INTRAVENOUS AS NEEDED
Status: DISCONTINUED | OUTPATIENT
Start: 2022-11-22 | End: 2022-11-22 | Stop reason: HOSPADM

## 2022-11-22 RX ORDER — NICARDIPINE HYDROCHLORIDE 2.5 MG/ML
INJECTION INTRAVENOUS
Status: COMPLETED
Start: 2022-11-22 | End: 2022-11-22

## 2022-11-22 RX ORDER — EPHEDRINE SULFATE 50 MG/ML
INJECTION INTRAVENOUS AS NEEDED
Status: DISCONTINUED | OUTPATIENT
Start: 2022-11-22 | End: 2022-11-22 | Stop reason: SURG

## 2022-11-22 RX ORDER — SODIUM CHLORIDE, SODIUM LACTATE, POTASSIUM CHLORIDE, CALCIUM CHLORIDE 600; 310; 30; 20 MG/100ML; MG/100ML; MG/100ML; MG/100ML
9 INJECTION, SOLUTION INTRAVENOUS CONTINUOUS
Status: DISCONTINUED | OUTPATIENT
Start: 2022-11-22 | End: 2022-11-23 | Stop reason: HOSPADM

## 2022-11-22 RX ORDER — SODIUM CHLORIDE 0.9 % (FLUSH) 0.9 %
10 SYRINGE (ML) INJECTION EVERY 12 HOURS SCHEDULED
Status: DISCONTINUED | OUTPATIENT
Start: 2022-11-22 | End: 2022-11-22 | Stop reason: HOSPADM

## 2022-11-22 RX ORDER — FAMOTIDINE 20 MG/1
20 TABLET, FILM COATED ORAL
Status: COMPLETED | OUTPATIENT
Start: 2022-11-22 | End: 2022-11-22

## 2022-11-22 RX ORDER — ONDANSETRON 4 MG/1
4 TABLET, FILM COATED ORAL EVERY 6 HOURS PRN
Status: DISCONTINUED | OUTPATIENT
Start: 2022-11-22 | End: 2022-11-23 | Stop reason: HOSPADM

## 2022-11-22 RX ORDER — ONDANSETRON 2 MG/ML
4 INJECTION INTRAMUSCULAR; INTRAVENOUS EVERY 6 HOURS PRN
Status: DISCONTINUED | OUTPATIENT
Start: 2022-11-22 | End: 2022-11-23 | Stop reason: HOSPADM

## 2022-11-22 RX ORDER — SODIUM CHLORIDE 450 MG/100ML
100 INJECTION, SOLUTION INTRAVENOUS CONTINUOUS
Status: DISCONTINUED | OUTPATIENT
Start: 2022-11-22 | End: 2022-11-23 | Stop reason: HOSPADM

## 2022-11-22 RX ORDER — FENTANYL CITRATE 50 UG/ML
INJECTION, SOLUTION INTRAMUSCULAR; INTRAVENOUS AS NEEDED
Status: DISCONTINUED | OUTPATIENT
Start: 2022-11-22 | End: 2022-11-22 | Stop reason: SURG

## 2022-11-22 RX ADMIN — NICARDIPINE HYDROCHLORIDE 25 MG: 25 INJECTION INTRAVENOUS at 13:05

## 2022-11-22 RX ADMIN — PROTAMINE SULFATE 100 MG: 10 INJECTION, SOLUTION INTRAVENOUS at 09:04

## 2022-11-22 RX ADMIN — MORPHINE SULFATE 2 MG: 2 INJECTION, SOLUTION INTRAMUSCULAR; INTRAVENOUS at 13:28

## 2022-11-22 RX ADMIN — ROCURONIUM BROMIDE 50 MG: 10 INJECTION, SOLUTION INTRAVENOUS at 06:59

## 2022-11-22 RX ADMIN — FAMOTIDINE 20 MG: 20 TABLET ORAL at 06:20

## 2022-11-22 RX ADMIN — CEFAZOLIN SODIUM 2 G: 2 INJECTION, SOLUTION INTRAVENOUS at 22:30

## 2022-11-22 RX ADMIN — ONDANSETRON 4 MG: 2 INJECTION INTRAMUSCULAR; INTRAVENOUS at 19:01

## 2022-11-22 RX ADMIN — CEFAZOLIN SODIUM 2 G: 2 INJECTION, SOLUTION INTRAVENOUS at 07:01

## 2022-11-22 RX ADMIN — EPHEDRINE SULFATE 10 MG: 50 INJECTION INTRAVENOUS at 08:11

## 2022-11-22 RX ADMIN — HEPARIN SODIUM 10000 UNITS: 1000 INJECTION, SOLUTION INTRAVENOUS; SUBCUTANEOUS at 07:41

## 2022-11-22 RX ADMIN — EPHEDRINE SULFATE 10 MG: 50 INJECTION INTRAVENOUS at 08:03

## 2022-11-22 RX ADMIN — MORPHINE SULFATE 2 MG: 2 INJECTION, SOLUTION INTRAMUSCULAR; INTRAVENOUS at 16:15

## 2022-11-22 RX ADMIN — HYDROCODONE BITARTRATE AND ACETAMINOPHEN 1 TABLET: 7.5; 325 TABLET ORAL at 11:35

## 2022-11-22 RX ADMIN — SUGAMMADEX 196 MG: 100 INJECTION, SOLUTION INTRAVENOUS at 09:17

## 2022-11-22 RX ADMIN — EPHEDRINE SULFATE 10 MG: 50 INJECTION INTRAVENOUS at 07:46

## 2022-11-22 RX ADMIN — FENTANYL CITRATE 50 MCG: 50 INJECTION, SOLUTION INTRAMUSCULAR; INTRAVENOUS at 10:05

## 2022-11-22 RX ADMIN — Medication 100 MCG: at 09:06

## 2022-11-22 RX ADMIN — HYDROCODONE BITARTRATE AND ACETAMINOPHEN 1 TABLET: 7.5; 325 TABLET ORAL at 16:15

## 2022-11-22 RX ADMIN — HYDROXYCHLOROQUINE SULFATE 200 MG: 200 TABLET, FILM COATED ORAL at 21:17

## 2022-11-22 RX ADMIN — CEFAZOLIN SODIUM 2 G: 2 INJECTION, SOLUTION INTRAVENOUS at 16:15

## 2022-11-22 RX ADMIN — FENTANYL CITRATE 100 MCG: 50 INJECTION, SOLUTION INTRAMUSCULAR; INTRAVENOUS at 06:59

## 2022-11-22 RX ADMIN — SODIUM CHLORIDE, POTASSIUM CHLORIDE, SODIUM LACTATE AND CALCIUM CHLORIDE 9 ML/HR: 600; 310; 30; 20 INJECTION, SOLUTION INTRAVENOUS at 06:20

## 2022-11-22 RX ADMIN — PROPOFOL 100 MG: 10 INJECTION, EMULSION INTRAVENOUS at 06:59

## 2022-11-22 RX ADMIN — HYDROCODONE BITARTRATE AND ACETAMINOPHEN 1 TABLET: 7.5; 325 TABLET ORAL at 21:17

## 2022-11-22 RX ADMIN — LABETALOL 20 MG/4 ML (5 MG/ML) INTRAVENOUS SYRINGE 10 MG: at 07:23

## 2022-11-22 RX ADMIN — MUPIROCIN 1 APPLICATION: 20 OINTMENT TOPICAL at 06:20

## 2022-11-22 RX ADMIN — PROPOFOL 100 MG: 10 INJECTION, EMULSION INTRAVENOUS at 07:04

## 2022-11-22 RX ADMIN — EPHEDRINE SULFATE 10 MG: 50 INJECTION INTRAVENOUS at 08:01

## 2022-11-22 RX ADMIN — DEXAMETHASONE SODIUM PHOSPHATE 8 MG: 4 INJECTION, SOLUTION INTRA-ARTICULAR; INTRALESIONAL; INTRAMUSCULAR; INTRAVENOUS; SOFT TISSUE at 07:02

## 2022-11-22 RX ADMIN — GLYCOPYRROLATE 0.2 MG: 0.2 INJECTION INTRAMUSCULAR; INTRAVENOUS at 08:11

## 2022-11-22 RX ADMIN — SODIUM CHLORIDE 2.5 MG/HR: 9 INJECTION, SOLUTION INTRAVENOUS at 12:19

## 2022-11-22 RX ADMIN — ROCURONIUM BROMIDE 10 MG: 10 INJECTION, SOLUTION INTRAVENOUS at 07:56

## 2022-11-22 RX ADMIN — EPHEDRINE SULFATE 10 MG: 50 INJECTION INTRAVENOUS at 07:43

## 2022-11-22 RX ADMIN — LABETALOL 20 MG/4 ML (5 MG/ML) INTRAVENOUS SYRINGE 10 MG: at 07:14

## 2022-11-22 RX ADMIN — LIDOCAINE HYDROCHLORIDE 50 MG: 10 INJECTION, SOLUTION EPIDURAL; INFILTRATION; INTRACAUDAL; PERINEURAL at 06:59

## 2022-11-22 RX ADMIN — Medication 100 MCG: at 08:51

## 2022-11-22 RX ADMIN — SODIUM CHLORIDE 100 ML/HR: 4.5 INJECTION, SOLUTION INTRAVENOUS at 10:47

## 2022-11-22 RX ADMIN — HYDROXYCHLOROQUINE SULFATE 200 MG: 200 TABLET, FILM COATED ORAL at 13:28

## 2022-11-22 RX ADMIN — SODIUM CHLORIDE 100 ML/HR: 4.5 INJECTION, SOLUTION INTRAVENOUS at 21:17

## 2022-11-22 RX ADMIN — LABETALOL 20 MG/4 ML (5 MG/ML) INTRAVENOUS SYRINGE 10 MG: at 07:05

## 2022-11-22 RX ADMIN — ROCURONIUM BROMIDE 15 MG: 10 INJECTION, SOLUTION INTRAVENOUS at 07:35

## 2022-11-22 RX ADMIN — SODIUM CHLORIDE 10 MG/HR: 9 INJECTION, SOLUTION INTRAVENOUS at 10:48

## 2022-11-22 RX ADMIN — ROCURONIUM BROMIDE 10 MG: 10 INJECTION, SOLUTION INTRAVENOUS at 08:30

## 2022-11-22 RX ADMIN — NICARDIPINE HYDROCHLORIDE 5 MG/HR: 25 INJECTION INTRAVENOUS at 07:07

## 2022-11-22 RX ADMIN — ALBUMIN HUMAN: 0.05 INJECTION, SOLUTION INTRAVENOUS at 08:01

## 2022-11-22 RX ADMIN — LIDOCAINE HYDROCHLORIDE 0.5 ML: 10 INJECTION, SOLUTION EPIDURAL; INFILTRATION; INTRACAUDAL; PERINEURAL at 06:20

## 2022-11-22 RX ADMIN — ONDANSETRON 4 MG: 2 INJECTION INTRAMUSCULAR; INTRAVENOUS at 08:15

## 2022-11-22 RX ADMIN — LABETALOL 20 MG/4 ML (5 MG/ML) INTRAVENOUS SYRINGE 10 MG: at 09:17

## 2022-11-22 NOTE — ANESTHESIA PREPROCEDURE EVALUATION
Anesthesia Evaluation     Patient summary reviewed and Nursing notes reviewed   NPO Solid Status: > 8 hours  NPO Liquid Status: > 2 hours           Airway   Mallampati: II  TM distance: >3 FB  No difficulty expected  Dental      Pulmonary    (+) a smoker, COPD, shortness of breath, decreased breath sounds,   Cardiovascular     ECG reviewed  Rhythm: regular  Rate: normal    (+) DVT,       Neuro/Psych  GI/Hepatic/Renal/Endo    (+)  GI bleeding ,     Musculoskeletal     Abdominal    Substance History      OB/GYN          Other   arthritis,                      Anesthesia Plan    ASA 3     general and Juanis     intravenous induction     Anesthetic plan, risks, benefits, and alternatives have been provided, discussed and informed consent has been obtained with: patient.    Plan discussed with CRNA.        CODE STATUS:

## 2022-11-22 NOTE — ANESTHESIA POSTPROCEDURE EVALUATION
Patient: Ro Walker    Procedure Summary     Date: 11/22/22 Room / Location:  [facility] /  [facility]    Anesthesia Start: 0656 Anesthesia Stop: 0931    Procedure: ABDOMINAL AORTIC ANEURYSM REPAIR WITH ENDOGRAFT, LEFT FEMORAL ENDARTERECTOMY WITH PATCH ANGIOPLASTY (Abdomen) Diagnosis:       Abdominal aortic aneurysm (AAA) without rupture, unspecified part      (Abdominal aortic aneurysm (AAA) without rupture, unspecified part [I71.40])    Surgeons: Nikko Polk MD Provider: Dhruv Singer MD    Anesthesia Type: general, Juanis ASA Status: 3          Anesthesia Type: general, [place]    Vitals  No vitals data found for the desired time range.          Post Anesthesia Care and Evaluation    Patient location during evaluation: PACU  Patient participation: complete - patient participated  Level of consciousness: awake and responsive to verbal stimuli  Pain score: 2  Pain management: adequate    Airway patency: patent  Anesthetic complications: No anesthetic complications    Cardiovascular status: acceptable  Respiratory status: acceptable  Hydration status: acceptable    Comments: Pt awake and responsive. SV. VSS. Report to RN. Patient Vitals in the past 24 hrs:  11/22/22 0608, BP:171/79  11/22/22 0607, BP:(!) 185/92, Temp:97.4 °F (36.3 °C), Temp src:Temporal, Pulse:66, Resp:16, SpO2:99 %  133/78. p 72. r 16. t 98.1

## 2022-11-22 NOTE — ANESTHESIA PROCEDURE NOTES
Airway  Urgency: elective    Date/Time: 11/22/2022 7:00 AM  Airway not difficult    General Information and Staff    Patient location during procedure: OR  CRNA/CAA: Alfredito Ching CRNA    Indications and Patient Condition  Indications for airway management: airway protection    Preoxygenated: yes  MILS not maintained throughout  Mask difficulty assessment: 1 - vent by mask    Final Airway Details  Final airway type: endotracheal airway      Successful airway: ETT  Cuffed: yes   Successful intubation technique: direct laryngoscopy  Facilitating devices/methods: intubating stylet  Endotracheal tube insertion site: oral  Blade: Nuha  Blade size: 3  ETT size (mm): 7.0  Cormack-Lehane Classification: grade IIa - partial view of glottis  Placement verified by: chest auscultation and capnometry   Measured from: lips  ETT/EBT  to lips (cm): 20  Number of attempts at approach: 1  Assessment: lips, teeth, and gum same as pre-op and atraumatic intubation    Additional Comments  Negative epigastric sounds, Breath sound equal bilaterally with symmetric chest rise and fall

## 2022-11-23 ENCOUNTER — READMISSION MANAGEMENT (OUTPATIENT)
Dept: CALL CENTER | Facility: HOSPITAL | Age: 72
End: 2022-11-23

## 2022-11-23 VITALS
SYSTOLIC BLOOD PRESSURE: 133 MMHG | HEART RATE: 68 BPM | OXYGEN SATURATION: 99 % | TEMPERATURE: 97.7 F | DIASTOLIC BLOOD PRESSURE: 55 MMHG | RESPIRATION RATE: 16 BRPM

## 2022-11-23 LAB
ANION GAP SERPL CALCULATED.3IONS-SCNC: 9 MMOL/L (ref 5–15)
BUN SERPL-MCNC: 11 MG/DL (ref 8–23)
BUN/CREAT SERPL: 20.4 (ref 7–25)
CALCIUM SPEC-SCNC: 8.5 MG/DL (ref 8.6–10.5)
CHLORIDE SERPL-SCNC: 105 MMOL/L (ref 98–107)
CO2 SERPL-SCNC: 23 MMOL/L (ref 22–29)
CREAT SERPL-MCNC: 0.54 MG/DL (ref 0.57–1)
CYTO UR: NORMAL
DEPRECATED RDW RBC AUTO: 49.1 FL (ref 37–54)
EGFRCR SERPLBLD CKD-EPI 2021: 96.7 ML/MIN/1.73
ERYTHROCYTE [DISTWIDTH] IN BLOOD BY AUTOMATED COUNT: 15 % (ref 12.3–15.4)
GLUCOSE SERPL-MCNC: 112 MG/DL (ref 65–99)
HCT VFR BLD AUTO: 28.8 % (ref 34–46.6)
HGB BLD-MCNC: 9.1 G/DL (ref 12–15.9)
LAB AP CASE REPORT: NORMAL
LAB AP CLINICAL INFORMATION: NORMAL
MCH RBC QN AUTO: 28.3 PG (ref 26.6–33)
MCHC RBC AUTO-ENTMCNC: 31.6 G/DL (ref 31.5–35.7)
MCV RBC AUTO: 89.7 FL (ref 79–97)
PATH REPORT.FINAL DX SPEC: NORMAL
PATH REPORT.GROSS SPEC: NORMAL
PLATELET # BLD AUTO: 90 10*3/MM3 (ref 140–450)
PMV BLD AUTO: 12.2 FL (ref 6–12)
POTASSIUM SERPL-SCNC: 4 MMOL/L (ref 3.5–5.2)
RBC # BLD AUTO: 3.21 10*6/MM3 (ref 3.77–5.28)
SODIUM SERPL-SCNC: 137 MMOL/L (ref 136–145)
WBC NRBC COR # BLD: 9.39 10*3/MM3 (ref 3.4–10.8)

## 2022-11-23 PROCEDURE — 80048 BASIC METABOLIC PNL TOTAL CA: CPT | Performed by: PHYSICIAN ASSISTANT

## 2022-11-23 PROCEDURE — 85027 COMPLETE CBC AUTOMATED: CPT | Performed by: PHYSICIAN ASSISTANT

## 2022-11-23 RX ORDER — ASPIRIN 81 MG/1
81 TABLET ORAL DAILY
Qty: 30 TABLET | Refills: 11 | Status: SHIPPED | OUTPATIENT
Start: 2022-11-23

## 2022-11-23 RX ORDER — CLOPIDOGREL BISULFATE 75 MG/1
75 TABLET ORAL DAILY
Qty: 30 TABLET | Refills: 11 | Status: SHIPPED | OUTPATIENT
Start: 2022-11-23 | End: 2023-01-20

## 2022-11-23 RX ORDER — HYDROCODONE BITARTRATE AND ACETAMINOPHEN 7.5; 325 MG/1; MG/1
1 TABLET ORAL EVERY 6 HOURS PRN
Qty: 18 TABLET | Refills: 0 | Status: SHIPPED | OUTPATIENT
Start: 2022-11-23 | End: 2022-12-15

## 2022-11-23 RX ADMIN — CLOPIDOGREL BISULFATE 75 MG: 75 TABLET ORAL at 08:14

## 2022-11-23 RX ADMIN — ASPIRIN 81 MG: 81 TABLET, COATED ORAL at 08:14

## 2022-11-23 RX ADMIN — HYDROXYCHLOROQUINE SULFATE 200 MG: 200 TABLET, FILM COATED ORAL at 08:14

## 2022-11-24 NOTE — OUTREACH NOTE
Prep Survey    Flowsheet Row Responses   [facility] patient discharged from? [place]   Is LACE score < 7 ? No   Emergency Room discharge w/ pulse ox? No   Eligibility Readm Mgmt   Discharge diagnosis repair of infrarenal aortic abdominal aneurysm with a [place] excluder device    Does the patient have one of the following disease processes/diagnoses(primary or secondary)? General Surgery   Does the patient have Home health ordered? No   Is there a DME ordered? No   Prep survey completed? Yes          CASSIE BERMUDEZ - Registered Nurse

## 2022-11-25 LAB
BH BB BLOOD EXPIRATION DATE: NORMAL
BH BB BLOOD TYPE BARCODE: 5100
BH BB DISPENSE STATUS: NORMAL
BH BB PRODUCT CODE: NORMAL
BH BB UNIT NUMBER: NORMAL
CROSSMATCH INTERPRETATION: NORMAL
UNIT  ABO: NORMAL
UNIT  RH: NORMAL

## 2022-12-01 ENCOUNTER — READMISSION MANAGEMENT (OUTPATIENT)
Dept: CALL CENTER | Facility: HOSPITAL | Age: 72
End: 2022-12-01

## 2022-12-01 NOTE — OUTREACH NOTE
General Surgery Week 1 Survey    Flowsheet Row Responses   [facility] patient discharged from? [place]   Does the patient have one of the following disease processes/diagnoses(primary or secondary)? General Surgery   Week 1 attempt successful? Yes   Call start time 1209   Call end time 1222   Discharge diagnosis repair of infrarenal aortic abdominal aneurysm with a [place] excluder device    Meds reviewed with patient/caregiver? Yes   Is the patient having any side effects they believe may be caused by any medication additions or changes? No   Does the patient have all medications related to this admission filled (includes all antibiotics, pain medications, etc.) Yes   Is the patient taking all medications as directed (includes completed medication regime)? Yes   Does the patient have a follow up appointment scheduled with their surgeon? Yes   Has the patient kept scheduled appointments due by today? Yes   Has home health visited the patient within 72 hours of discharge? N/A   Psychosocial issues? No   Did the patient receive a copy of their discharge instructions? Yes   Nursing interventions Reviewed instructions with patient   What is the patient's perception of their health status since discharge? Improving   Nursing interventions Nurse provided patient education   Is the patient /caregiver able to teach back basic post-op care? Continue use of incentive spirometry at least 1 week post discharge, Practice 'cough and deep breath', Drive as instructed by MD in discharge instructions, Take showers only when approved by MD-sponge bathe until then, No tub bath, swimming, or hot tub until instructed by MD, Keep incision areas clean,dry and protected, Do not remove steri-strips, Lifting as instructed by MD in discharge instructions  [did not get Respirex]   Is the patient/caregiver able to teach back signs and symptoms of incisional infection? Increased redness, swelling or pain at the incisonal site, Increased  drainage or bleeding, Incisional warmth, Pus or odor from incision, Fever   Is the patient/caregiver able to teach back steps to recovery at home? Set small, achievable goals for return to baseline health, Rest and rebuild strength, gradually increase activity, Eat a well-balance diet   If the patient is a current smoker, are they able to teach back resources for cessation? --  [has not smoked since surgery, hopes to quit permanently]   Is the patient/caregiver able to teach back the hierarchy of who to call/visit for symptoms/problems? PCP, Specialist, Home health nurse, Urgent Care, ED, 911 Yes   Week 1 call completed? Yes          ASHLEY ARMENDARIZ - Registered Nurse

## 2022-12-12 ENCOUNTER — READMISSION MANAGEMENT (OUTPATIENT)
Dept: CALL CENTER | Facility: HOSPITAL | Age: 72
End: 2022-12-12

## 2022-12-12 NOTE — OUTREACH NOTE
General Surgery Week 2 Survey    Flowsheet Row Responses   [facility] patient discharged from? [place]   Does the patient have one of the following disease processes/diagnoses(primary or secondary)? General Surgery   Week 2 attempt successful? Yes   Call start time 1350   Call end time 1353   Discharge diagnosis repair of infrarenal aortic abdominal aneurysm with a [place] excluder device    Is patient permission given to speak with other caregiver? Yes   List who call center can speak with Son-Riley   Person spoke with today (if not patient) and relationship Son-Riley   Meds reviewed with patient/caregiver? Yes   Is the patient having any side effects they believe may be caused by any medication additions or changes? No   Does the patient have all medications related to this admission filled (includes all antibiotics, pain medications, etc.) Yes   Is the patient taking all medications as directed (includes completed medication regime)? Yes   Does the patient have a follow up appointment scheduled with their surgeon? Yes   Has the patient kept scheduled appointments due by today? N/A   Comments Surgery follow up 12/15/22   Has home health visited the patient within 72 hours of discharge? N/A   Psychosocial issues? No   Did the patient receive a copy of their discharge instructions? Yes   Nursing interventions Reviewed instructions with patient   What is the patient's perception of their health status since discharge? Improving   Nursing interventions Nurse provided patient education   Is the patient/caregiver able to teach back signs and symptoms of incisional infection? Increased drainage or bleeding, Increased redness, swelling or pain at the incisonal site, Incisional warmth, Pus or odor from incision, Fever   Is the patient/caregiver able to teach back steps to recovery at home? Set small, achievable goals for return to baseline health, Rest and rebuild strength, gradually increase activity, Eat a  well-balance diet   If the patient is a current smoker, are they able to teach back resources for cessation? --  [Not currently smoking]   Is the patient/caregiver able to teach back the hierarchy of who to call/visit for symptoms/problems? PCP, Specialist, Home health nurse, Urgent Care, ED, 911 Yes   Week 2 call completed? Yes   Is the patient interested in additional calls from an ambulatory ?  NOTE:  applies to high risk patients requiring additional follow-up. No   Wrap up additional comments Son reports patient is getting stronger daily, color is good, No S/S of infection          STEPHANIE T - Registered Nurse

## 2022-12-15 ENCOUNTER — OFFICE VISIT (OUTPATIENT)
Dept: CARDIAC SURGERY | Facility: CLINIC | Age: 72
End: 2022-12-15

## 2022-12-15 VITALS
BODY MASS INDEX: 20.09 KG/M2 | OXYGEN SATURATION: 99 % | HEIGHT: 61 IN | TEMPERATURE: 97.8 F | HEART RATE: 66 BPM | DIASTOLIC BLOOD PRESSURE: 78 MMHG | WEIGHT: 106.4 LBS | SYSTOLIC BLOOD PRESSURE: 138 MMHG

## 2022-12-15 DIAGNOSIS — I71.43 INFRARENAL ABDOMINAL AORTIC ANEURYSM (AAA) WITHOUT RUPTURE: Primary | ICD-10-CM

## 2022-12-15 DIAGNOSIS — K59.00 CONSTIPATION, UNSPECIFIED CONSTIPATION TYPE: ICD-10-CM

## 2022-12-15 DIAGNOSIS — I20.0 UNSTABLE ANGINA PECTORIS: ICD-10-CM

## 2022-12-15 DIAGNOSIS — R35.0 URINARY FREQUENCY: ICD-10-CM

## 2022-12-15 PROBLEM — K65.1 POSTPROCEDURAL INTRAABDOMINAL ABSCESS: Status: RESOLVED | Noted: 2020-06-22 | Resolved: 2022-12-15

## 2022-12-15 PROBLEM — I82.409 ACUTE DVT (DEEP VENOUS THROMBOSIS) (HCC): Status: RESOLVED | Noted: 2020-06-21 | Resolved: 2022-12-15

## 2022-12-15 PROBLEM — T81.43XA POSTPROCEDURAL INTRAABDOMINAL ABSCESS: Status: RESOLVED | Noted: 2020-06-22 | Resolved: 2022-12-15

## 2022-12-15 PROBLEM — K92.1 GASTROINTESTINAL HEMORRHAGE WITH MELENA: Status: RESOLVED | Noted: 2020-06-21 | Resolved: 2022-12-15

## 2022-12-15 PROBLEM — J18.9 PNEUMONIA OF BOTH LOWER LOBES DUE TO INFECTIOUS ORGANISM: Status: RESOLVED | Noted: 2020-06-21 | Resolved: 2022-12-15

## 2022-12-15 PROCEDURE — 99024 POSTOP FOLLOW-UP VISIT: CPT | Performed by: NURSE PRACTITIONER

## 2022-12-15 NOTE — PROGRESS NOTES
[facility] Cardiothoracic Surgery Office Follow Up Note     Date of Encounter: 12/15/2022     Name: Ro Walker  : 1950     Referred By: No ref. provider found  PCP: Alec Arellano MD    Chief Complaint:    Chief Complaint   Patient presents with   • Aortic Aneurysm     Hospital follow up s/p EVAR on 22       Subjective      History of Present Illness:    Ro Walker is a very pleasant 72 y.o. female former smoker (surgical quit date) with history of cervical cancer s/p hysterectomy 2018 with Dr Dietrich, lupus on Plaquenil, LLE DVT , and AAA s/p EVAR with [place] excluder device via left femoral endarterectomy with pericardial patch on 2022 by Dr. Polk.  Patient had uneventful postoperative course and was discharged on POD #1 with no readmissions.  She presents today for postoperative eval.  She has had no issues with incisional healing but has not felt well since before surgery with several complaints and generalized fatigue. Of note increasing AAA was discovered during work-up for abdominal pain and constipation. Patient is still struggling with these complaints and does not currently have further work-up planned with PCP or GI (not established). She does have a hx of incarcerated inguinal hernia s/p open small bowel resection and repair with mesh 2020 Dr Lara; complicated by possible intraabdominal infection and  post-op GI bleed. She also is complaining of urinary frequency; not routinely followed by GYN with her history of cervical cancer with known pelvic floor prolapse. She also admits today that she has been experiencing intermittent chest pain over the last week or so.  She has had 3 episodes that last several minutes and are self-limiting.  No exacerbating factors.  She does have associated SOA with episodes.  Not routinely followed by cardiology has had no previous ischemic eval.    Review of Systems:  Review of Systems   Constitutional: Positive for  malaise/fatigue. Negative for chills, decreased appetite, diaphoresis, fever, night sweats, weight gain and weight loss.   HENT: Negative for hoarse voice.    Eyes: Negative for blurred vision, double vision and visual disturbance.   Cardiovascular: Positive for chest pain (onset x1 week ago. pain comes and goes ). Negative for claudication, dyspnea on exertion, irregular heartbeat, leg swelling, near-syncope, orthopnea, palpitations, paroxysmal nocturnal dyspnea and syncope.   Respiratory: Positive for shortness of breath (when having chest pain ). Negative for cough, hemoptysis, sputum production and wheezing.    Hematologic/Lymphatic: Negative for adenopathy and bleeding problem. Does not bruise/bleed easily.   Skin: Negative for color change, nail changes, poor wound healing and rash.   Musculoskeletal: Negative for back pain, falls and muscle cramps.   Gastrointestinal: Negative for abdominal pain, dysphagia and heartburn.   Genitourinary: Negative for flank pain.   Neurological: Positive for dizziness, light-headedness, numbness (in toes ) and paresthesias (in toes ). Negative for brief paralysis, disturbances in coordination, focal weakness, headaches, loss of balance, sensory change, vertigo and weakness.   Psychiatric/Behavioral: Negative for depression and suicidal ideas.   Allergic/Immunologic: Negative for persistent infections.       I have reviewed the following portions of the patient's history: allergies, current medications, past family history, past medical history, past social history, past surgical history and problem list and confirm it's accurate.    Allergies:  No Known Allergies    Medications:      Current Outpatient Medications:   •  alendronate (FOSAMAX) 70 MG tablet, Take 70 mg by mouth Every 7 (Seven) Days. Only takes 1 weekly -  only takes when remembers, Disp: , Rfl:   •  aspirin 81 MG EC tablet, Take 1 tablet by mouth Daily., Disp: 30 tablet, Rfl: 11  •  clopidogrel (PLAVIX) 75 MG  tablet, Take 1 tablet by mouth Daily., Disp: 30 tablet, Rfl: 11  •  hydroxychloroquine (PLAQUENIL) 200 MG tablet, Take 200 mg by mouth 2 (Two) Times a Day., Disp: , Rfl:     History:   Past Medical History:   Diagnosis Date   • Arthritis    • Cervical cancer (HCC)    • Dizzy    • Gastrointestinal hemorrhage with melena 6/21/2020    Added automatically from request for surgery [id number]   • H/O LLE DVT 6/2020 on ? Xarelto 6/21/2020   • Incontinence of urine     more stress induced   • Lupus (HCC)    • Osteoporosis    • Pneumonia of both lower lobes due to infectious organism 6/21/2020   • Polyp of vocal cord    • Postprocedural intraabdominal abscess 6/22/2020   • Sensitive skin    • SOBOE (shortness of breath on exertion)    • Wears glasses     readers       Past Surgical History:   Procedure Laterality Date   • ABDOMINAL AORTIC ANEURYSM REPAIR WITH ENDOGRAFT N/A 11/22/2022    Procedure: ABDOMINAL AORTIC ANEURYSM REPAIR WITH ENDOGRAFT, LEFT FEMORAL ENDARTERECTOMY WITH PATCH ANGIOPLASTY;  Surgeon: Nikko Polk MD;  Location:  [facility];  Service: Cardiothoracic;  Laterality: N/A;  MGY: 341  FLUORO: 18.48  ISOVUE 300: 60M   • CATARACT EXTRACTION Left 2008   • CERVICAL CONE BIOPSY     • CERVICAL CONE BIOPSY     • COLON RESECTION SMALL BOWEL N/A 06/13/2020    Procedure: COLON RESECTION SMALL BOWEL;  Surgeon: Huy Lara MD;  Location:  [facility];  Service: General;  Laterality: N/A;   • COLONOSCOPY N/A 06/23/2020    Procedure: COLONOSCOPY;  Surgeon: Kareem Feldman MD;  Location:  [facility];  Service: Gastroenterology;  Laterality: N/A;   • ENDOSCOPY Left 06/22/2020    Procedure: ESOPHAGOGASTRODUODENOSCOPY;  Surgeon: Kareem Feldman MD;  Location:  [facility];  Service: Gastroenterology;  Laterality: Left;   • INGUINAL HERNIA REPAIR Left 06/13/2020    Procedure: INGUINAL HERNIA REPAIR LEFT;  Surgeon: Huy Lara MD;  Location:  [facility];  Service: General;  Laterality: Left;   •  "LAPAROSCOPIC ASSISTED VAGINAL HYSTERECTOMY N/A 2018    Procedure: LAPAROSCOPIC ASSISTED VAGINAL HYSTERECTOMY, BILATERAL SALPINGO OOPHORECTOMY;  Surgeon: María Dietrich MD;  Location: [facility];  Service:    • RETINAL DETACHMENT REPAIR Left    • THROAT SURGERY  2017   • TUBAL ABDOMINAL LIGATION         Social History     Socioeconomic History   • Marital status:    • Number of children: 4   Tobacco Use   • Smoking status: Former     Packs/day: 0.50     Years: 55.00     Pack years: 27.50     Types: Cigarettes     Quit date: 2022     Years since quittin.0   • Smokeless tobacco: Never   • Tobacco comments:     Pt states that she has smoked 55 years off and smokes less then a whole pack some days not smoking any at way.    Vaping Use   • Vaping Use: Never used   Substance and Sexual Activity   • Alcohol use: No   • Drug use: No   • Sexual activity: Never        Family History   Problem Relation Age of Onset   • Heart attack Mother    • Stroke Father    • Stomach cancer Sister    • Cancer Brother    • Cancer Brother        Objective     Physical Exam:  Vitals:    12/15/22 0943   BP: 138/78   BP Location: Right arm   Patient Position: Sitting   Pulse: 66   Temp: 97.8 °F (36.6 °C)   SpO2: 99%   Weight: 48.3 kg (106 lb 6.4 oz)   Height: 154.9 cm (61\")      Body mass index is 20.1 kg/m².    Physical Exam  Vitals and nursing note reviewed.   Constitutional:       Appearance: Normal appearance.   Cardiovascular:      Rate and Rhythm: Normal rate and regular rhythm.      Pulses: No decreased pulses.           Femoral pulses are 2+ on the right side and 2+ on the left side.     Heart sounds: Normal heart sounds, S1 normal and S2 normal. No murmur heard.  Pulmonary:      Effort: Pulmonary effort is normal.      Breath sounds: Normal breath sounds.   Abdominal:      Palpations: Abdomen is soft.   Musculoskeletal:         General: Normal range of motion.      Right lower leg: No edema.      Left lower " leg: No edema.   Feet:      Right foot:      Skin integrity: Skin integrity normal. No ulcer, skin breakdown, callus or dry skin.      Left foot:      Skin integrity: Skin integrity normal. No ulcer, skin breakdown, callus or dry skin.   Skin:     General: Skin is warm and dry.      Capillary Refill: Capillary refill takes less than 2 seconds.      Coloration: Skin is pale.      Comments: Right femoral puncture site - healed  Left femoral vertical incision: well healing with wound edges well approximated, no surround erythema, edema, warmth, drainage or hematoma. Overlying surgical glue still intact   Neurological:      General: No focal deficit present.      Mental Status: She is alert and oriented to person, place, and time. Mental status is at baseline.   Psychiatric:         Mood and Affect: Mood normal.         Behavior: Behavior normal.         Imaging/Labs:  CT Abdomen Pelvis ([facility]9/28/2022  Impression: Abdominal aortic aneurysm has increased in size by 1 cm in the last 2 years.  Now measures 4.5 cm.  Constipation pattern.  Small left kidneys, previously present on 6/21/2020.  Hysterectomy, also present previously.  Pelvic floor prolapse, another chronic finding.    CT Abdomen Pelvis ()-6/21/2020  Postoperative changes from recent inguinal hernia surgery. Lower lobe lobe consolidation bilaterally. Fluid collection wit hin the deep pelvis measuring 2.5 x 3.3 x 1.8 cm.  Fluid collection within the left anterior pelvis measuring 10.8 x 6.2 x  5.3 cm with tiny focus of air. Additional ancillary findings are unchanged.     IMPRESSION:  2 fluid collections within the pelvis, sterility of which cannot be confirmed by imaging.     CT Abdomen Pelvis -6/15/2020  Atherosclerotic abdominal aorta measuring up to 3.7 cm aneurysmal in the infrarenal portion.   1. Dilated fluid-filled small bowel within the left midabdomen extending to the lower midabdomen where there is anastomosis in the right lower  midabdomen concerning for transition point and obstructive or at least partially obstructive gas pattern with postsurgical changes including extraluminal gas and fluid which should be correlated with surgical history to evaluate for appropriate time point, however no definitive peripherally enhancing focal fluid collection to suggest abscess, however, likely premature for development of definitive abscess given postsurgical changes. Limited evaluation of intraluminal contrast extent of involvement due to obstructive bowel gas pattern.  2. Opacifications of the lung bases right greater than left of moderate to severe atelectatic changes of subsegmental involvement with trace bilateral pleural effusions.  3. Asymmetric edema within the lower left abdomen and left thigh body wall tissues may represent postsurgical change given left inguinal soft tissue gas, however, underlying deep vein thrombosis could have a similar appearance and may be further evaluated with duplex.    Assessment / Plan      Assessment / Plan:  Diagnoses and all orders for this visit:    1. Infrarenal abdominal aortic aneurysm (AAA) without rupture s/p EVAR 11/2022 (Primary)  · enlarging AAA s/p EVAR with [place] excluder device via left femoral endarterectomy with pericardial patch on 11/22/2022 by Dr. Polk.  · Postop eval with no incisional complications.  Right puncture site healed.  Left incision well approximated with overlying surgical glue still intact.  No underlying induration or fluctuation.  2+ femoral pulses.  · Discussed ongoing wound care with plain antibacterial soap washes and positional avoidance.  · Follow-up in 4 weeks for wound check  · Postop CT imaging to be ordered at follow-up    2. Unstable angina pectoris (HCC)  · Not currently stable with cardiology.  · No previous ischemic work-up  · Will refer to cardiology for eval    3. Constipation, unspecified constipation type  · Bowel habit changes with unknown last screening  colonoscopy  · History of small bowel resection  · Please discuss further work-up with PCP and possible referral to GI     4. Urinary frequency  · Hx of cervical cancer s/p hysterectomy  · Not routinely followed by GYN/onc  · CT imaging with known chronic pelvic floor prolapse  · Recommend follow-up with PCP for possible urine studies and GYN       Follow Up:   Return in about 4 weeks (around 1/12/2023) for Wound check.   Or sooner for any further concerns or worsening sign and symptoms. If unable to reach us in the office please dial 911 or go to the nearest emergency department.      VIRIDIANA Pena  [facility] Cardiothoracic Surgery

## 2023-01-19 ENCOUNTER — OFFICE VISIT (OUTPATIENT)
Dept: CARDIAC SURGERY | Facility: CLINIC | Age: 73
End: 2023-01-19
Payer: MEDICARE

## 2023-01-19 VITALS
BODY MASS INDEX: 20.99 KG/M2 | HEIGHT: 61 IN | HEART RATE: 77 BPM | DIASTOLIC BLOOD PRESSURE: 80 MMHG | SYSTOLIC BLOOD PRESSURE: 139 MMHG | OXYGEN SATURATION: 97 % | TEMPERATURE: 97.3 F | WEIGHT: 111.2 LBS

## 2023-01-19 DIAGNOSIS — I71.43 INFRARENAL ABDOMINAL AORTIC ANEURYSM (AAA) WITHOUT RUPTURE: Primary | ICD-10-CM

## 2023-01-19 PROCEDURE — 99024 POSTOP FOLLOW-UP VISIT: CPT | Performed by: NURSE PRACTITIONER

## 2023-01-19 RX ORDER — LATANOPROST 50 UG/ML
SOLUTION/ DROPS OPHTHALMIC
COMMUNITY
Start: 2023-01-13 | End: 2023-03-02 | Stop reason: ALTCHOICE

## 2023-01-19 NOTE — PROGRESS NOTES
[facility] Cardiothoracic Surgery Office Follow Up Note     Date of Encounter: 2023     Name: Ro Walker  : 1950     Referred By: No ref. provider found  PCP: Alec Arellano MD    Chief Complaint:    Chief Complaint   Patient presents with   • Wound Check     4 week follow-up for evaluation of left groin incision. Patient has been cleaning incision daily with soap and water and using antibiotic ointment daily as well.        Subjective      History of Present Illness:    Ro Walker is a very pleasant 72 y.o. female current smoker with history of cervical cancer s/p hysterectomy 2018 with Dr Dietrich, lupus on Plaquenil, LLE DVT , and AAA s/p EVAR with [place] excluder device via left femoral endarterectomy with pericardial patch on 2022 by Dr. Polk.  Patient had uneventful postoperative course and was discharged on POD #1 with no readmissions.  She was seen 2022 for postoperative eval with no issues with incisional healing but has not felt well since before surgery with several complaints and generalized fatigue. Of note increasing AAA was discovered during work-up for abdominal pain and constipation. Patient is still struggling with these complaints and does not currently have further work-up planned with PCP or GI (not established). She does have a hx of incarcerated inguinal hernia s/p open small bowel resection and repair with mesh 2020 Dr Lara; complicated by possible intraabdominal infection and  post-op GI bleed. She also is complaining of urinary frequency; not routinely followed by GYN with her history of cervical cancer with known pelvic floor prolapse. She also admitted that she has been experiencing intermittent chest pain over the last week or so. She has had 3 episodes that last several minutes and are self-limiting.  No exacerbating factors.  She does have associated SOA with episodes.  Not routinely followed by cardiology has had no previous  ischemic eval. She was referred to cardiology in [place] but did not schedule an appointment nor has she been seen by PCP or GYN for her other complaints. She has had too much going on with her Lupus provider and getting her eyes lasered. She presents today for wound check with completely healed incision. She is still having some numbness down her medial left thigh. Unfortunately, she has picked up smoking again.     Review of Systems:  Review of Systems   Constitutional: Negative for chills, decreased appetite, diaphoresis, fever, malaise/fatigue, night sweats, weight gain and weight loss.   HENT: Negative for hoarse voice.    Eyes: Negative for blurred vision, double vision and visual disturbance.   Cardiovascular: Negative for chest pain, claudication, dyspnea on exertion, irregular heartbeat, leg swelling, near-syncope, orthopnea, palpitations, paroxysmal nocturnal dyspnea and syncope.   Respiratory: Negative for cough, hemoptysis, shortness of breath, sputum production and wheezing.    Hematologic/Lymphatic: Negative for adenopathy and bleeding problem. Bruises/bleeds easily.   Skin: Negative for color change, nail changes, poor wound healing and rash.   Musculoskeletal: Negative for back pain, falls and muscle cramps.   Gastrointestinal: Negative for abdominal pain, dysphagia and heartburn.   Genitourinary: Negative for flank pain.   Neurological: Positive for dizziness. Negative for brief paralysis, disturbances in coordination, focal weakness, headaches, light-headedness, loss of balance, numbness, paresthesias, sensory change, vertigo and weakness.   Psychiatric/Behavioral: Negative for depression and suicidal ideas.   Allergic/Immunologic: Negative for persistent infections.       I have reviewed the following portions of the patient's history: allergies, current medications, past family history, past medical history, past social history, past surgical history and problem list and confirm it's  accurate.    Allergies:  No Known Allergies    Medications:      Current Outpatient Medications:   •  alendronate (FOSAMAX) 70 MG tablet, Take 70 mg by mouth Every 7 (Seven) Days. Only takes 1 weekly -  only takes when remembers, Disp: , Rfl:   •  aspirin 81 MG EC tablet, Take 1 tablet by mouth Daily., Disp: 30 tablet, Rfl: 11  •  clopidogrel (PLAVIX) 75 MG tablet, Take 1 tablet by mouth Daily., Disp: 30 tablet, Rfl: 11  •  hydroxychloroquine (PLAQUENIL) 200 MG tablet, Take 200 mg by mouth 2 (Two) Times a Day., Disp: , Rfl:   •  latanoprost (XALATAN) 0.005 % ophthalmic solution, , Disp: , Rfl:     History:   Past Medical History:   Diagnosis Date   • Arthritis    • Cervical cancer (HCC)    • Dizzy    • Gastrointestinal hemorrhage with melena 6/21/2020    Added automatically from request for surgery [id number]   • H/O LLE DVT 6/2020 on ? Xarelto 6/21/2020   • Incontinence of urine     more stress induced   • Lupus (HCC)    • Osteoporosis    • Pneumonia of both lower lobes due to infectious organism 6/21/2020   • Polyp of vocal cord    • Postprocedural intraabdominal abscess 6/22/2020   • Sensitive skin    • SOBOE (shortness of breath on exertion)    • Wears glasses     readers       Past Surgical History:   Procedure Laterality Date   • ABDOMINAL AORTIC ANEURYSM REPAIR WITH ENDOGRAFT N/A 11/22/2022    Procedure: ABDOMINAL AORTIC ANEURYSM REPAIR WITH ENDOGRAFT, LEFT FEMORAL ENDARTERECTOMY WITH PATCH ANGIOPLASTY;  Surgeon: Nikko Polk MD;  Location: [facility];  Service: Cardiothoracic;  Laterality: N/A;  MGY: 341  FLUORO: 18.48  ISOVUE 300: 60M   • CATARACT EXTRACTION Left 2008   • CERVICAL CONE BIOPSY     • CERVICAL CONE BIOPSY     • COLON RESECTION SMALL BOWEL N/A 06/13/2020    Procedure: COLON RESECTION SMALL BOWEL;  Surgeon: Huy Lara MD;  Location: [facility];  Service: General;  Laterality: N/A;   • COLONOSCOPY N/A 06/23/2020    Procedure: COLONOSCOPY;  Surgeon: Kareem Feldman MD;   "Location:  [facility];  Service: Gastroenterology;  Laterality: N/A;   • ENDOSCOPY Left 06/22/2020    Procedure: ESOPHAGOGASTRODUODENOSCOPY;  Surgeon: Kareem Feldman MD;  Location:  [facility];  Service: Gastroenterology;  Laterality: Left;   • INGUINAL HERNIA REPAIR Left 06/13/2020    Procedure: INGUINAL HERNIA REPAIR LEFT;  Surgeon: Huy Lara MD;  Location:  [facility];  Service: General;  Laterality: Left;   • LAPAROSCOPIC ASSISTED VAGINAL HYSTERECTOMY N/A 02/26/2018    Procedure: LAPAROSCOPIC ASSISTED VAGINAL HYSTERECTOMY, BILATERAL SALPINGO OOPHORECTOMY;  Surgeon: María Dietrich MD;  Location:  [facility];  Service:    • RETINAL DETACHMENT REPAIR Left 2006   • THROAT SURGERY  2017   • TUBAL ABDOMINAL LIGATION         Social History     Socioeconomic History   • Marital status:    • Number of children: 4   Tobacco Use   • Smoking status: Every Day     Packs/day: 0.25     Years: 55.00     Pack years: 13.75     Types: Cigarettes   • Smokeless tobacco: Never   • Tobacco comments:     Pt states that she has smoked 55 years off and smokes less then a whole pack some days not smoking any at way.    Vaping Use   • Vaping Use: Never used   Substance and Sexual Activity   • Alcohol use: No   • Drug use: No   • Sexual activity: Never        Family History   Problem Relation Age of Onset   • Heart attack Mother    • Stroke Father    • Stomach cancer Sister    • Cancer Brother    • Cancer Brother        Objective     Physical Exam:  Vitals:    01/19/23 0901 01/19/23 0902   BP: 129/80 139/80   BP Location: Right arm Left arm   Patient Position: Sitting Sitting   Pulse: 77    Temp: 97.3 °F (36.3 °C)    SpO2: 97%    Weight: 50.4 kg (111 lb 3.2 oz)    Height: 154.9 cm (61\")       Body mass index is 21.01 kg/m².    Physical Exam  Vitals and nursing note reviewed.   Constitutional:       Appearance: Normal appearance.   Cardiovascular:      Rate and Rhythm: Normal rate and regular rhythm.      Pulses: " No decreased pulses.           Femoral pulses are 2+ on the right side and 2+ on the left side.     Heart sounds: Normal heart sounds, S1 normal and S2 normal. No murmur heard.  Pulmonary:      Effort: Pulmonary effort is normal.      Breath sounds: Normal breath sounds.   Abdominal:      Palpations: Abdomen is soft.   Musculoskeletal:         General: Normal range of motion.      Right lower leg: No edema.      Left lower leg: No edema.   Feet:      Right foot:      Skin integrity: Skin integrity normal. No ulcer, skin breakdown, callus or dry skin.      Left foot:      Skin integrity: Skin integrity normal. No ulcer, skin breakdown, callus or dry skin.   Skin:     General: Skin is warm and dry.      Capillary Refill: Capillary refill takes less than 2 seconds.      Coloration: Skin is not pale.      Comments: Right femoral puncture site healed  Left femoral vertical incision: well healed with wound edges well approximated, no surround erythema, edema, warmth, drainage or hematoma.    Neurological:      General: No focal deficit present.      Mental Status: She is alert and oriented to person, place, and time. Mental status is at baseline.   Psychiatric:         Mood and Affect: Mood normal.         Behavior: Behavior normal.         Imaging/Labs:  CT Abdomen Pelvis ([facility])-9/28/2022  Impression: Abdominal aortic aneurysm has increased in size by 1 cm in the last 2 years.  Now measures 4.5 cm.  Constipation pattern.  Small left kidneys, previously present on 6/21/2020.  Hysterectomy, also present previously.  Pelvic floor prolapse, another chronic finding.     CT Abdomen Pelvis ()-6/21/2020  Postoperative changes from recent inguinal hernia surgery. Lower lobe lobe consolidation bilaterally. Fluid collection wit hin the deep pelvis measuring 2.5 x 3.3 x 1.8 cm.  Fluid collection within the left anterior pelvis measuring 10.8 x 6.2 x  5.3 cm with tiny focus of air. Additional ancillary findings are  unchanged.     IMPRESSION:  2 fluid collections within the pelvis, sterility of which cannot be confirmed by imaging.      CT Abdomen Pelvis -6/15/2020  Atherosclerotic abdominal aorta measuring up to 3.7 cm aneurysmal in the infrarenal portion.   1. Dilated fluid-filled small bowel within the left midabdomen extending to the lower midabdomen where there is anastomosis in the right lower midabdomen concerning for transition point and obstructive or at least partially obstructive gas pattern with postsurgical changes including extraluminal gas and fluid which should be correlated with surgical history to evaluate for appropriate time point, however no definitive peripherally enhancing focal fluid collection to suggest abscess, however, likely premature for development of definitive abscess given postsurgical changes. Limited evaluation of intraluminal contrast extent of involvement due to obstructive bowel gas pattern.  2. Opacifications of the lung bases right greater than left of moderate to severe atelectatic changes of subsegmental involvement with trace bilateral pleural effusions.  3. Asymmetric edema within the lower left abdomen and left thigh body wall tissues may represent postsurgical change given left inguinal soft tissue gas, however, underlying deep vein thrombosis could have a similar appearance and may be further evaluated with duplex.    Assessment / Plan      Assessment / Plan:  Diagnoses and all orders for this visit:    1. Infrarenal abdominal aortic aneurysm (AAA) without rupture s/p EVAR 11/2022 (Primary)  -     CT Angiogram Abdomen Pelvis; Future    · enlarging AAA s/p EVAR with [place] excluder device via left femoral endarterectomy with pericardial patch on 11/22/2022 by Dr. Polk.  • Postop eval 12/2022 with no incisional complications.  Right puncture site healed.  Left incision well approximated with overlying surgical glue still intact.  No underlying induration or fluctuation.  2+ femoral  pulses.  • Wound check today with completely healed left incision. Released from wound care and activity restritions  • Postop CT imaging in 3 months, ordered today. Pending results may follow-up via telehealth     2. Unstable angina pectoris (HCC)  • Not currently established with cardiology.  • No previous ischemic work-up  • Referred to cardiology for eval who contacted pt but she declined appointment. She has been provided office number to schedule herself when she is ready     3. Constipation, unspecified constipation type  • Bowel habit changes with unknown last screening colonoscopy  • History of small bowel resection  • Please discuss further work-up with PCP and possible referral to GI      4. Urinary frequency  • Hx of cervical cancer s/p hysterectomy  • Not routinely followed by GYN/onc  • CT imaging with known chronic pelvic floor prolapse  • Recommend follow-up with PCP for possible urine studies and GYN       Follow Up:   Return in about 3 months (around 4/19/2023) for Imaging next visit: CTA abd/pelvis.   Or sooner for any further concerns or worsening sign and symptoms. If unable to reach us in the office please dial 911 or go to the nearest emergency department.      VIRIDIANA Pena  [facility]

## 2023-03-01 DIAGNOSIS — I71.43 INFRARENAL ABDOMINAL AORTIC ANEURYSM (AAA) WITHOUT RUPTURE: Primary | ICD-10-CM

## 2023-03-02 ENCOUNTER — OFFICE VISIT (OUTPATIENT)
Dept: CARDIOLOGY | Facility: CLINIC | Age: 73
End: 2023-03-02
Payer: MEDICARE

## 2023-03-02 VITALS
OXYGEN SATURATION: 99 % | WEIGHT: 111 LBS | SYSTOLIC BLOOD PRESSURE: 177 MMHG | HEART RATE: 70 BPM | BODY MASS INDEX: 20.96 KG/M2 | DIASTOLIC BLOOD PRESSURE: 79 MMHG | HEIGHT: 61 IN

## 2023-03-02 DIAGNOSIS — R07.9 CHEST PAIN, UNSPECIFIED TYPE: ICD-10-CM

## 2023-03-02 DIAGNOSIS — I71.43 INFRARENAL ABDOMINAL AORTIC ANEURYSM (AAA) WITHOUT RUPTURE: Primary | ICD-10-CM

## 2023-03-02 DIAGNOSIS — R06.02 SHORTNESS OF BREATH: ICD-10-CM

## 2023-03-02 DIAGNOSIS — R00.2 PALPITATIONS: ICD-10-CM

## 2023-03-02 PROBLEM — M81.0 OSTEOPOROSIS: Status: ACTIVE | Noted: 2023-03-02

## 2023-03-02 PROCEDURE — 99204 OFFICE O/P NEW MOD 45 MIN: CPT | Performed by: PHYSICIAN ASSISTANT

## 2023-03-02 RX ORDER — NITROGLYCERIN 0.4 MG/1
TABLET SUBLINGUAL
Qty: 25 TABLET | Refills: 11 | Status: SHIPPED | OUTPATIENT
Start: 2023-03-02

## 2023-03-02 RX ORDER — CLOPIDOGREL BISULFATE 75 MG/1
75 TABLET ORAL DAILY
COMMUNITY

## 2023-03-02 RX ORDER — LISINOPRIL 40 MG/1
40 TABLET ORAL DAILY
COMMUNITY

## 2023-03-02 NOTE — PROGRESS NOTES
Subjective   Ro Walker is a 72 y.o. female     Chief Complaint   Patient presents with   • New Patient     Rcds in chart       Problem list   1.  Infrarenal abdominal aortic aneurysm status post endovascular repair November 2022 by Dr. Polk  2.  Hypertension  3.  Chronic tobacco use            HPI    Patient is a 72-year-old female who presents to the office today to establish care.  Patient has the above past medical history.  Recently, she has noticed palpitations as a fluttering type sensation.  She will feel this intermittently at times.  She also has noticed chest discomfort.  However, she notices this particularly on the right side of her chest after she has felt palpitations.  She does not describe any other type of discomfort.    She does experience shortness of breath that is mild.  With her chronic tobacco use this has been chronic.  No complaints of PND orthopnea.    Patient occasionally is palpitations as a fluttering sensation.  She does not describe any stroke like symptoms.  No complaints of dizziness, presyncope or syncope.  Otherwise patient is doing well.              Current Outpatient Medications on File Prior to Visit   Medication Sig Dispense Refill   • alendronate (FOSAMAX) 70 MG tablet Take 1 tablet by mouth Every 7 (Seven) Days. Only takes 1 weekly -  only takes when remembers     • aspirin 81 MG EC tablet Take 1 tablet by mouth Daily. 30 tablet 11   • clopidogrel (PLAVIX) 75 MG tablet Take 1 tablet by mouth Daily.     • hydroxychloroquine (PLAQUENIL) 200 MG tablet Take 1 tablet by mouth Daily.     • lisinopril (PRINIVIL,ZESTRIL) 40 MG tablet Take 1 tablet by mouth Daily.     • [DISCONTINUED] latanoprost (XALATAN) 0.005 % ophthalmic solution        No current facility-administered medications on file prior to visit.       Tetanus toxoids and Statins    Past Medical History:   Diagnosis Date   • Arthritis    • Cervical cancer (HCC)    • Dizzy    • DVT (deep venous thrombosis) (HCC)     • Gastrointestinal hemorrhage with melena 06/21/2020    Added automatically from request for surgery [id number]   • H/O LLE DVT 6/2020 on ? Xarelto 06/21/2020   • Incontinence of urine     more stress induced   • Lupus (HCC)    • Osteoporosis    • Osteoporosis    • Pneumonia of both lower lobes due to infectious organism 06/21/2020   • Polyp of vocal cord    • Postprocedural intraabdominal abscess 06/22/2020   • Sensitive skin    • SOBOE (shortness of breath on exertion)    • Wears glasses     readers       Social History     Socioeconomic History   • Marital status:    • Number of children: 4   Tobacco Use   • Smoking status: Every Day     Packs/day: 2.00     Years: 57.00     Pack years: 114.00     Types: Cigarettes   • Smokeless tobacco: Never   • Tobacco comments:     Pt states that she has smoked 55 years off and smokes less then a whole pack some days not smoking any at way.    Vaping Use   • Vaping Use: Never used   Substance and Sexual Activity   • Alcohol use: No   • Drug use: No   • Sexual activity: Defer       Family History   Problem Relation Age of Onset   • Heart attack Mother    • Stroke Father    • Stomach cancer Sister    • Cancer Brother    • Cancer Brother        Review of Systems   Constitutional: Negative.  Negative for chills and fever.   HENT: Negative.  Negative for congestion and sinus pressure.    Respiratory: Positive for shortness of breath. Negative for chest tightness.    Cardiovascular: Positive for chest pain and palpitations. Negative for leg swelling.   Gastrointestinal: Negative.  Negative for abdominal pain, blood in stool, constipation, diarrhea, nausea and vomiting.   Endocrine: Positive for cold intolerance.   Genitourinary: Positive for frequency. Negative for dysuria, hematuria and urgency.   Neurological: Positive for light-headedness. Negative for dizziness and syncope.   Hematological: Bruises/bleeds easily.   Psychiatric/Behavioral: Positive for agitation, confusion  "and decreased concentration.       Objective   Vitals:    03/02/23 0932   BP: 177/79   BP Location: Left arm   Patient Position: Sitting   Pulse: 70   SpO2: 99%   Weight: 50.3 kg (111 lb)   Height: 154.9 cm (61\")      /79 (BP Location: Left arm, Patient Position: Sitting)   Pulse 70   Ht 154.9 cm (61\")   Wt 50.3 kg (111 lb)   SpO2 99%   BMI 20.97 kg/m²     Lab Results (most recent)     None          Physical Exam  Vitals and nursing note reviewed.   Constitutional:       General: She is not in acute distress.     Appearance: Normal appearance. She is well-developed.   HENT:      Head: Normocephalic and atraumatic.   Eyes:      General: No scleral icterus.        Right eye: No discharge.         Left eye: No discharge.      Conjunctiva/sclera: Conjunctivae normal.   Neck:      Vascular: No carotid bruit.   Cardiovascular:      Rate and Rhythm: Normal rate and regular rhythm.      Heart sounds: Normal heart sounds. No murmur heard.    No friction rub. No gallop.   Pulmonary:      Effort: Pulmonary effort is normal. No respiratory distress.      Breath sounds: Normal breath sounds. No wheezing or rales.   Chest:      Chest wall: No tenderness.   Musculoskeletal:      Right lower leg: No edema.      Left lower leg: No edema.   Skin:     General: Skin is warm and dry.      Coloration: Skin is not pale.      Findings: No erythema or rash.   Neurological:      Mental Status: She is alert and oriented to person, place, and time.      Cranial Nerves: No cranial nerve deficit.   Psychiatric:         Behavior: Behavior normal.         Procedure   Procedures       Assessment & Plan     Problems Addressed this Visit        Cardiac and Vasculature    Chest pain    Relevant Orders    Adult Transthoracic Echo Complete W/ Cont if Necessary Per Protocol    Stress Test With Myocardial Perfusion One Day    Cardiac Event Monitor    Palpitations    Relevant Orders    Adult Transthoracic Echo Complete W/ Cont if Necessary Per " Protocol    Stress Test With Myocardial Perfusion One Day    Cardiac Event Monitor       Pulmonary and Pneumonias    Shortness of breath    Relevant Orders    Adult Transthoracic Echo Complete W/ Cont if Necessary Per Protocol    Stress Test With Myocardial Perfusion One Day    Cardiac Event Monitor       Other    Infrarenal abdominal aortic aneurysm (AAA) without rupture s/p EVAR 11/2022 - Primary    Relevant Orders    Adult Transthoracic Echo Complete W/ Cont if Necessary Per Protocol    Stress Test With Myocardial Perfusion One Day    Cardiac Event Monitor   Diagnoses       Codes Comments    Infrarenal abdominal aortic aneurysm (AAA) without rupture s/p EVAR 11/2022    -  Primary ICD-10-CM: I71.43  ICD-9-CM: 441.4     Chest pain, unspecified type     ICD-10-CM: R07.9  ICD-9-CM: 786.50     Shortness of breath     ICD-10-CM: R06.02  ICD-9-CM: 786.05     Palpitations     ICD-10-CM: R00.2  ICD-9-CM: 785.1         Recommendation  1.  Patient is a 72-year-old female that has complaints of chest pain, dyspnea and palpitations with history of aneurysm.  Because of her symptoms, we will like to schedule testing.    2.  Stress test will be ordered for ischemia assessment for risk stratification.    3.  Echo to evaluate and assess LV systolic and diastolic 4 months, valvular structures etc.    4.  I would like to schedule for monitor to evaluate for any arrhythmic substrate.    5.  She recently had blood pressure medications adjusted.  I instructed her to call our office as we can make adjustments telephonically if her blood pressure continues to be elevated.  Otherwise, we will see her back for follow-up on the above testing recommend further.  Nitroglycerin has been sent in as needed for chest pain.  She is to follow with primary scheduled.             Ro Walker  reports that she has been smoking cigarettes. She has a 114.00 pack-year smoking history. She has never used smokeless tobacco.. I have educated her on the  risk of diseases from using tobacco products such as cancer, COPD and heart disease.     I advised her to quit and she is not willing to quit.    I spent 3  minutes counseling the patient.        Patient brought in medicine list to appointment, it's been reviewed with patient and med list was updated in the chart.     Advance Care Planning   ACP discussion was held with the patient during this visit. Patient does not have an advance directive, declines further assistance.       BMI is within normal parameters. No other follow-up for BMI required.       Electronically signed by:

## 2023-03-07 ENCOUNTER — OFFICE VISIT (OUTPATIENT)
Dept: OBSTETRICS AND GYNECOLOGY | Facility: CLINIC | Age: 73
End: 2023-03-07
Payer: MEDICARE

## 2023-03-07 VITALS — BODY MASS INDEX: 21.01 KG/M2 | DIASTOLIC BLOOD PRESSURE: 70 MMHG | SYSTOLIC BLOOD PRESSURE: 160 MMHG | WEIGHT: 111.2 LBS

## 2023-03-07 DIAGNOSIS — I71.43 INFRARENAL ABDOMINAL AORTIC ANEURYSM (AAA) WITHOUT RUPTURE: ICD-10-CM

## 2023-03-07 DIAGNOSIS — R07.9 CHEST PAIN, UNSPECIFIED TYPE: ICD-10-CM

## 2023-03-07 DIAGNOSIS — Z12.31 ENCOUNTER FOR SCREENING MAMMOGRAM FOR MALIGNANT NEOPLASM OF BREAST: ICD-10-CM

## 2023-03-07 DIAGNOSIS — R06.02 SHORTNESS OF BREATH: ICD-10-CM

## 2023-03-07 DIAGNOSIS — R00.2 PALPITATIONS: ICD-10-CM

## 2023-03-07 DIAGNOSIS — Z01.419 ENCOUNTER FOR GYNECOLOGICAL EXAMINATION WITHOUT ABNORMAL FINDING: Primary | ICD-10-CM

## 2023-03-07 PROCEDURE — 99387 INIT PM E/M NEW PAT 65+ YRS: CPT | Performed by: OBSTETRICS & GYNECOLOGY

## 2023-03-07 RX ORDER — LINACLOTIDE 145 UG/1
CAPSULE, GELATIN COATED ORAL
COMMUNITY
Start: 2023-03-01

## 2023-03-07 NOTE — PROGRESS NOTES
Subjective   Chief Complaint   Patient presents with   • Gynecologic Exam     New Patient here for Yearly exam and pap smear     Ro Walker is a 72 y.o. year old .  No LMP recorded. Patient has had a hysterectomy.  She presents to be seen because of annual, exam.  LAVH/BSO for ELISABETH 5 years ago..       OTHER COMPLAINTS:  Nothing else    The following portions of the patient's history were reviewed and updated as appropriate:  She  has a past medical history of Abnormal ECG, Abnormal Pap smear of cervix, Arthritis, Cervical cancer (HCC), Dizzy, DVT (deep venous thrombosis) ([facility]), Gastrointestinal hemorrhage with melena (2020), H/O LLE DVT 2020 on ? Xarelto (2020), Hypertension, Incontinence of urine, Lupus (HCC), Osteoporosis, Osteoporosis, Pneumonia of both lower lobes due to infectious organism (2020), Polyp of vocal cord, Postprocedural intraabdominal abscess (2020), Sensitive skin, SOBOE (shortness of breath on exertion), and Wears glasses.  She does not have any pertinent problems on file.  She  has a past surgical history that includes Throat surgery (); Retinal Detachment Repair (Left, ); Cataract extraction (Left, ); Cervical cone biopsy; Tubal ligation; Cervical cone biopsy; laparoscopic assisted vaginal hysterectomy (N/A, 2018); Small Bowel Resection (N/A, 2020); Inguinal Hernia Repair (Left, 2020); Esophagogastroduodenoscopy (Left, 2020); Colonoscopy (N/A, 2020); and AAA repair, endovascular (N/A, 2022).  Her family history includes Cancer in her brother and brother; Heart attack in her mother; Stomach cancer in her sister; Stroke in her father.  She  reports that she has been smoking cigarettes. She has a 114.00 pack-year smoking history. She has never used smokeless tobacco. She reports that she does not drink alcohol and does not use drugs.  Current Outpatient Medications   Medication Sig Dispense Refill   • alendronate  (FOSAMAX) 70 MG tablet Take 1 tablet by mouth Every 7 (Seven) Days. Only takes 1 weekly -  only takes when remembers     • aspirin 81 MG EC tablet Take 1 tablet by mouth Daily. 30 tablet 11   • clopidogrel (PLAVIX) 75 MG tablet Take 1 tablet by mouth Daily.     • hydroxychloroquine (PLAQUENIL) 200 MG tablet Take 1 tablet by mouth Daily.     • Linzess 145 MCG capsule capsule 1 capsule at least 30 minutes before the first meal of the day on an empty stomach Orally Once a day 30 day(s)     • lisinopril (PRINIVIL,ZESTRIL) 40 MG tablet Take 1 tablet by mouth Daily.     • nitroglycerin (NITROSTAT) 0.4 MG SL tablet 1 under the tongue as needed for angina, may repeat q5mins for up three doses 25 tablet 11     No current facility-administered medications for this visit.     Current Outpatient Medications on File Prior to Visit   Medication Sig   • alendronate (FOSAMAX) 70 MG tablet Take 1 tablet by mouth Every 7 (Seven) Days. Only takes 1 weekly -  only takes when remembers   • aspirin 81 MG EC tablet Take 1 tablet by mouth Daily.   • clopidogrel (PLAVIX) 75 MG tablet Take 1 tablet by mouth Daily.   • hydroxychloroquine (PLAQUENIL) 200 MG tablet Take 1 tablet by mouth Daily.   • Linzess 145 MCG capsule capsule 1 capsule at least 30 minutes before the first meal of the day on an empty stomach Orally Once a day 30 day(s)   • lisinopril (PRINIVIL,ZESTRIL) 40 MG tablet Take 1 tablet by mouth Daily.   • nitroglycerin (NITROSTAT) 0.4 MG SL tablet 1 under the tongue as needed for angina, may repeat q5mins for up three doses     No current facility-administered medications on file prior to visit.     She is allergic to tetanus toxoids and statins.    Social History    Tobacco Use      Smoking status: Every Day        Packs/day: 2.00        Years: 57.00        Pack years: 114        Types: Cigarettes      Smokeless tobacco: Never      Tobacco comments: Pt states that she has smoked 55 years off and smokes less then a whole pack some  days not smoking any at way.     Review of Systems  Consitutional POS: nothing reported    NEG: anorexia or night sweats   Gastointestinal POS: nothing reported    NEG: bloating, change in bowel habits, melena or reflux symptoms   Genitourinary POS: nothing reported    NEG: dysuria or hematuria   Integument POS: nothing reported    NEG: moles that are changing in size, shape, color or rashes   Breast POS: nothing reported    NEG: persistent breast lump, skin dimpling or nipple discharge         Respiratory: negative  Cardiovascular: negative          Objective   /70   Wt 50.4 kg (111 lb 3.2 oz)   BMI 21.01 kg/m²     General:  well developed; well nourished  no acute distress   Skin:  No suspicious lesions seen   Thyroid: normal to inspection and palpation   Lungs:  breathing is unlabored  clear to auscultation bilaterally   Heart:  regular rate and rhythm, S1, S2 normal, no murmur, click, rub or gallop   Breasts:  Examined in supine position  Symmetric without masses or skin dimpling  Nipples normal without inversion, lesions or discharge  There are no palpable axillary nodes   Abdomen: soft, non-tender; no masses  no umbilical or inguinal hernias are present  no hepato-splenomegaly   Pelvis: Clinical staff was present for exam  External genitalia:  normal appearance of the external genitalia including Bartholin's and [place]'s glands.  :  urethral meatus normal;  Vaginal:  normal pink mucosa without prolapse or lesions.  Cervix:  absent.  Uterus:  absent.  Adnexa:  absent, bilateral.  Rectal:  digital rectal exam not performed; anus visually normal appearing.     Psychiatric: Alert and oriented ×3, mood and affect appropriate  HEENT: Atraumatic, normocephalic, normal scleral icterus  Extremities: 2+ pulses bilaterally, no edema      Lab Review   No data reviewed    Imaging   CT of abdomen/pelvis report        Assessment   1. Normal PE  2. H/O ELISABETH     Plan   1. PAP done  2. MMG set up in  [facility]  3. DEXA  4. Diet/exercise    No orders of the defined types were placed in this encounter.         This note was electronically signed.      March 7, 2023

## 2023-03-10 LAB — REF LAB TEST METHOD: NORMAL

## 2023-03-15 ENCOUNTER — HOSPITAL ENCOUNTER (OUTPATIENT)
Dept: CT IMAGING | Facility: HOSPITAL | Age: 73
Discharge: HOME | End: 2023-03-15
Admitting: REGISTERED NURSE
Payer: MEDICARE

## 2023-03-15 DIAGNOSIS — I71.43 INFRARENAL ABDOMINAL AORTIC ANEURYSM (AAA) WITHOUT RUPTURE: ICD-10-CM

## 2023-03-15 LAB — CREAT BLDA-MCNC: 0.8 MG/DL (ref 0.6–1.3)

## 2023-03-15 PROCEDURE — 82565 ASSAY OF CREATININE: CPT

## 2023-03-15 PROCEDURE — 74174 CTA ABD&PLVS W/CONTRAST: CPT

## 2023-03-15 PROCEDURE — 25510000001 IOPAMIDOL PER 1 ML: Performed by: REGISTERED NURSE

## 2023-03-15 RX ADMIN — IOPAMIDOL 80 ML: 755 INJECTION, SOLUTION INTRAVENOUS at 09:20

## 2023-04-12 ENCOUNTER — TELEPHONE (OUTPATIENT)
Dept: CARDIOLOGY | Facility: CLINIC | Age: 73
End: 2023-04-12
Payer: MEDICARE

## 2023-04-12 NOTE — TELEPHONE ENCOUNTER
ECHO  Pt notified of no acute findings. Provider will discuss results at f/u. Pt reminded of appt date and time.  ----- Message from MILKA Willard sent at 4/12/2023  3:24 PM EDT -----  Routine follow-up

## 2023-04-13 ENCOUNTER — LAB (OUTPATIENT)
Dept: LAB | Facility: HOSPITAL | Age: 73
End: 2023-04-13
Payer: MEDICARE

## 2023-04-13 ENCOUNTER — OFFICE VISIT (OUTPATIENT)
Dept: GASTROENTEROLOGY | Facility: CLINIC | Age: 73
End: 2023-04-13
Payer: MEDICARE

## 2023-04-13 VITALS
WEIGHT: 110 LBS | HEIGHT: 61 IN | TEMPERATURE: 98 F | DIASTOLIC BLOOD PRESSURE: 88 MMHG | BODY MASS INDEX: 20.77 KG/M2 | SYSTOLIC BLOOD PRESSURE: 164 MMHG | HEART RATE: 73 BPM

## 2023-04-13 DIAGNOSIS — Z12.11 ENCOUNTER FOR SCREENING FOR MALIGNANT NEOPLASM OF COLON: ICD-10-CM

## 2023-04-13 DIAGNOSIS — R13.10 DYSPHAGIA, UNSPECIFIED TYPE: ICD-10-CM

## 2023-04-13 DIAGNOSIS — K58.1 IRRITABLE BOWEL SYNDROME WITH CONSTIPATION: ICD-10-CM

## 2023-04-13 DIAGNOSIS — K59.00 CONSTIPATION, UNSPECIFIED CONSTIPATION TYPE: ICD-10-CM

## 2023-04-13 DIAGNOSIS — Z72.0 TOBACCO ABUSE: ICD-10-CM

## 2023-04-13 DIAGNOSIS — Z80.0 FAMILY HX OF COLON CANCER: ICD-10-CM

## 2023-04-13 DIAGNOSIS — D50.0 ANEMIA, BLOOD LOSS: ICD-10-CM

## 2023-04-13 DIAGNOSIS — R10.84 GENERALIZED ABDOMINAL PAIN: Primary | ICD-10-CM

## 2023-04-13 LAB
BASOPHILS # BLD AUTO: 0.02 10*3/MM3 (ref 0–0.2)
BASOPHILS NFR BLD AUTO: 0.4 % (ref 0–1.5)
DEPRECATED RDW RBC AUTO: 44 FL (ref 37–54)
EOSINOPHIL # BLD AUTO: 0 10*3/MM3 (ref 0–0.4)
EOSINOPHIL NFR BLD AUTO: 0 % (ref 0.3–6.2)
ERYTHROCYTE [DISTWIDTH] IN BLOOD BY AUTOMATED COUNT: 14 % (ref 12.3–15.4)
HCT VFR BLD AUTO: 35.2 % (ref 34–46.6)
HGB BLD-MCNC: 11.5 G/DL (ref 12–15.9)
IMM GRANULOCYTES # BLD AUTO: 0 10*3/MM3 (ref 0–0.05)
IMM GRANULOCYTES NFR BLD AUTO: 0 % (ref 0–0.5)
LYMPHOCYTES # BLD AUTO: 1.73 10*3/MM3 (ref 0.7–3.1)
LYMPHOCYTES NFR BLD AUTO: 32.3 % (ref 19.6–45.3)
MCH RBC QN AUTO: 28.3 PG (ref 26.6–33)
MCHC RBC AUTO-ENTMCNC: 32.7 G/DL (ref 31.5–35.7)
MCV RBC AUTO: 86.5 FL (ref 79–97)
MONOCYTES # BLD AUTO: 0.33 10*3/MM3 (ref 0.1–0.9)
MONOCYTES NFR BLD AUTO: 6.2 % (ref 5–12)
NEUTROPHILS NFR BLD AUTO: 3.27 10*3/MM3 (ref 1.7–7)
NEUTROPHILS NFR BLD AUTO: 61.1 % (ref 42.7–76)
NRBC BLD AUTO-RTO: 0 /100 WBC (ref 0–0.2)
PLATELET # BLD AUTO: 102 10*3/MM3 (ref 140–450)
PMV BLD AUTO: 12.2 FL (ref 6–12)
RBC # BLD AUTO: 4.07 10*6/MM3 (ref 3.77–5.28)
WBC NRBC COR # BLD: 5.35 10*3/MM3 (ref 3.4–10.8)

## 2023-04-13 PROCEDURE — 82784 ASSAY IGA/IGD/IGG/IGM EACH: CPT

## 2023-04-13 PROCEDURE — 86364 TISS TRNSGLTMNASE EA IG CLAS: CPT

## 2023-04-13 PROCEDURE — 85025 COMPLETE CBC W/AUTO DIFF WBC: CPT

## 2023-04-13 PROCEDURE — 1160F RVW MEDS BY RX/DR IN RCRD: CPT | Performed by: INTERNAL MEDICINE

## 2023-04-13 PROCEDURE — 1159F MED LIST DOCD IN RCRD: CPT | Performed by: INTERNAL MEDICINE

## 2023-04-13 PROCEDURE — 99214 OFFICE O/P EST MOD 30 MIN: CPT | Performed by: INTERNAL MEDICINE

## 2023-04-13 PROCEDURE — 36415 COLL VENOUS BLD VENIPUNCTURE: CPT

## 2023-04-13 RX ORDER — DICYCLOMINE HYDROCHLORIDE 10 MG/1
10 CAPSULE ORAL 3 TIMES DAILY PRN
Qty: 60 CAPSULE | Refills: 1 | Status: SHIPPED | OUTPATIENT
Start: 2023-04-13

## 2023-04-13 NOTE — PROGRESS NOTES
Follow Up Note     Date: 2023   Patient Name: Ro Walker  MRN: [id number]  : 1950     Referring Physician: Alec Arellano MD    Chief Complaint:    Chief Complaint   Patient presents with   • Establish Care   • Abdominal Pain     Hx of    • Constipation       Interval History:   2023  Ro Walker is a 73 y.o. female who is here today for follow up for ongoing abdominal pain constipation.    She states that she has been having issues with generalized abdominal pain and constipation for many years now which is bothering her more now.  She gets the pain intermittently mostly in the mid abdomen once in few days.  She has a longstanding history of constipation and was being started on a Linzess 145 mcg p.o. daily in the past..  She has been doing well initially but later on it did not help currently having bowel movement once in 2 to 3 days hard stool despite on current dose of Linzess.  She denies any nausea vomiting.  She does have a mild abdominal bloating.    She has a occasional difficulty in swallowing mostly confined to the throat.  She states that this developed after she had a surgery for laryngeal polyp.  Denies any significant reflux symptoms.  She is currently on aspirin and the Plavix.  She is a chronic smoker.  Denies any alcohol consumption.  Her sister had a colon cancer and brother had some type of cancer.    Patient was admitted in [facility] in 2020 for incarcerated inguinal hernia and had to undergo surgery.  She had a postprocedure bleeding.  She did have a evaluation done for GI bleeding with a EGD colonoscopy and PillCam study and those did not reveal any obvious source of bleeding except possible  anastomotic site bleeding from small bowel resection.  She also had a abdominal arctic aneurysm repair with endograft in 2022.      As per record she also has a history of lupus.    Subjective      Past Medical History:   Past Medical  History:   Diagnosis Date   • Abnormal ECG    • Abnormal Pap smear of cervix    • Arthritis    • Bowel obstruction     Hx bowel incarceration   • Cervical cancer    • Chronic laryngitis    • Dizzy    • DVT (deep venous thrombosis)    • Gastrointestinal hemorrhage with melena 06/21/2020    Added automatically from request for surgery [id number]   • GI hemorrhage    • H/O LLE DVT 6/2020 on ? Xarelto 06/21/2020   • Hoarseness of voice    • Hypertension    • Incarcerated inguinal hernia    • Incontinence of urine     more stress induced   • Lupus    • Osteoporosis    • Pneumonia of both lower lobes due to infectious organism 06/21/2020   • Polyp of vocal cord    • Postprocedural intraabdominal abscess 06/22/2020   • Retinal detachment    • Sensitive skin    • SLE (systemic lupus erythematosus)    • SOBOE (shortness of breath on exertion)    • Thrombocytopenia    • Vocal fold polyp    • Wears glasses     readers     Past Surgical History:   Past Surgical History:   Procedure Laterality Date   • ABDOMINAL AORTIC ANEURYSM REPAIR WITH ENDOGRAFT N/A 11/22/2022    Procedure: ABDOMINAL AORTIC ANEURYSM REPAIR WITH ENDOGRAFT, LEFT FEMORAL ENDARTERECTOMY WITH PATCH ANGIOPLASTY;  Surgeon: Nikko Polk MD;  Location:  [facility];  Service: Cardiothoracic;  Laterality: N/A;  MGY: 341  FLUORO: 18.48  ISOVUE 300: 60M   • CATARACT EXTRACTION Left 2008   • CERVICAL CONE BIOPSY     • CERVICAL CONE BIOPSY     • COLON RESECTION SMALL BOWEL N/A 06/13/2020    Procedure: COLON RESECTION SMALL BOWEL;  Surgeon: Huy Lara MD;  Location:  [facility];  Service: General;  Laterality: N/A;   • COLONOSCOPY N/A 06/23/2020    Procedure: COLONOSCOPY;  Surgeon: Kareem Feldman MD;  Location:  [facility];  Service: Gastroenterology;  Laterality: N/A;   • ENDOSCOPY Left 06/22/2020    Procedure: ESOPHAGOGASTRODUODENOSCOPY;  Surgeon: Kareem Feldman MD;  Location:  [facility];  Service: Gastroenterology;  Laterality: Left;   •  INGUINAL HERNIA REPAIR Left 06/13/2020    Procedure: INGUINAL HERNIA REPAIR LEFT;  Surgeon: Huy Lara MD;  Location:  [facility];  Service: General;  Laterality: Left;   • LAPAROSCOPIC ASSISTED VAGINAL HYSTERECTOMY N/A 02/26/2018    Procedure: LAPAROSCOPIC ASSISTED VAGINAL HYSTERECTOMY, BILATERAL SALPINGO OOPHORECTOMY;  Surgeon: María Dietrich MD;  Location:  [facility];  Service:    • RETINAL DETACHMENT REPAIR Left 2006   • THROAT SURGERY  2017    polyps removed   • TUBAL ABDOMINAL LIGATION         Family History:   Family History   Problem Relation Age of Onset   • Heart attack Mother    • Stroke Father    • Stomach cancer Sister    • Cancer Brother    • Cancer Brother    • Colon cancer Neg Hx         Unknown       Social History:   Social History     Socioeconomic History   • Marital status:    • Number of children: 4   Tobacco Use   • Smoking status: Every Day     Packs/day: 1.00     Years: 57.00     Pack years: 57.00     Types: Cigarettes     Start date: 1966   • Smokeless tobacco: Never   Vaping Use   • Vaping Use: Never used   Substance and Sexual Activity   • Alcohol use: No   • Drug use: No   • Sexual activity: Defer       Medications:     Current Outpatient Medications:   •  alendronate (FOSAMAX) 70 MG tablet, Take 1 tablet by mouth Every 7 (Seven) Days. Only takes 1 weekly -  only takes when remembers, Disp: , Rfl:   •  aspirin 81 MG EC tablet, Take 1 tablet by mouth Daily., Disp: 30 tablet, Rfl: 11  •  clopidogrel (PLAVIX) 75 MG tablet, Take 1 tablet by mouth Daily., Disp: , Rfl:   •  lisinopril (PRINIVIL,ZESTRIL) 40 MG tablet, Take 1 tablet by mouth Daily., Disp: , Rfl:   •  nitroglycerin (NITROSTAT) 0.4 MG SL tablet, 1 under the tongue as needed for angina, may repeat q5mins for up three doses, Disp: 25 tablet, Rfl: 11  •  dicyclomine (BENTYL) 10 MG capsule, Take 1 capsule by mouth 3 (Three) Times a Day As Needed (abdominal pain)., Disp: 60 capsule, Rfl: 1  •  hydroxychloroquine  "(PLAQUENIL) 200 MG tablet, Take 1 tablet by mouth Daily. (Patient not taking: Reported on 4/13/2023), Disp: , Rfl:   •  linaclotide (LINZESS) 290 MCG capsule capsule, Take 1 capsule by mouth Every Morning Before Breakfast., Disp: 90 capsule, Rfl: 3    Allergies:   Allergies   Allergen Reactions   • Tetanus Toxoids Itching and Rash   • Statins Unknown - Low Severity       Review of Systems:   Review of Systems   Constitutional: Positive for appetite change and unexpected weight loss. Negative for fatigue and fever.   HENT: Positive for trouble swallowing.    Gastrointestinal: Positive for constipation. Negative for abdominal distention, abdominal pain, anal bleeding, blood in stool, diarrhea, nausea, rectal pain, vomiting, GERD and indigestion.       The following portions of the patient's history were reviewed and updated as appropriate: allergies, current medications, past family history, past medical history, past social history, past surgical history and problem list.    Objective     Physical Exam:  Vital Signs:   Vitals:    04/13/23 1531 04/13/23 1534 04/13/23 1536   BP: (!) 181/90  Comment: talking, RT arm (!) 190/80  Comment: Lt arm 164/88  Comment: manual, rt arm   Pulse: 77 73    Temp: 98 °F (36.7 °C)     TempSrc: Infrared     Weight: 49.9 kg (110 lb)     Height: 154.9 cm (61\")  Comment: patient states         Physical Exam  Constitutional:       Appearance: Normal appearance.   HENT:      Head: Normocephalic and atraumatic.   Eyes:      Conjunctiva/sclera: Conjunctivae normal.   Abdominal:      General: Abdomen is flat. There is no distension.      Palpations: There is no mass.      Tenderness: There is no abdominal tenderness. There is no guarding or rebound.      Hernia: No hernia is present.   Musculoskeletal:      Cervical back: Normal range of motion and neck supple.   Neurological:      Mental Status: She is alert.         Results Review:   I reviewed the patient's new clinical results.    [facility] " Outpatient Visit on 03/15/2023   Component Date Value Ref Range Status   • Creatinine 03/15/2023 0.80  0.60 - 1.30 mg/dL Final    Serial Number: [id number]:  [id number]   Office Visit on 2023   Component Date Value Ref Range Status   • Reference Lab Report 2023    Final                    Value:[facility]  [address] [place] [address]  Phone: [phone] or [phone]  Fax: [phone]  Giorgio Almanza M.D., Medical Director    PATIENT NAME                                     LABORATORY NO.  [id number]   SANDOR BOLDEN                                 [id number]  [id number]                                 AGE                    SEX   SSN              CLIENT REF #  [facility]                        72        1950      F     [id number]      [id number]    [address] [facility] [address]          REQUESTING M.D.           ATTENDING M.D.         COPY TO.  [place] [address]                                    [place] [address]                         DATE COLLECTED            DATE RECEIVED          DATE REPORTED  2023                2023             03/10/2023    ThinPrep Pap with Cytyc Imaging    DIAGNOSIS:  Negative for intraepithelial lesion or malignancy    Multiple factors can influence accuracy of                           Pap tests; therefore, screening at  regular intervals is necessary for early cancer detection.    COMMENT:     Benign cellular changes associated with atrophy are present.    SPECIMEN ADEQUACY:  SATISFACTORY FOR EVALUATION  SOURCE OF SPECIMEN:       VAGINAL  SLIDES:  1  CLINICAL HISTORY:  Encounter for gynecological examination without abnormal finding    CYTOTECHNOLOGIST:             ALL, CT (ASCP)    CPT CODES:  35635     Hospital Outpatient Visit on 2023   Component Date Value Ref Range Status   • Target HR (85%) 2023 124  bpm In process   • Max. Pred. HR (100%) 2023 146  bpm In process       CT Angiogram Abdomen Pelvis    Result Date: 3/15/2023  Impression: 1. Patent aortobiiliac stent graft without obvious type II endoleak. There is some question as to the presence of a clinically insignificant type I endoleak on the left side, at or near the renal artery ostium. Please see above discussion. 2. Interim decrease, abdominal aortic aneurysm sac caliber, currently measuring 3.5 cm in maximum AP dimension on axial images. 3. Occluded left hypogastric arterial origin with reconstitution of a somewhat diminutive vessel. 4. Continued patency of the right main renal and hypogastric arteries. 5. Chronic occlusion/critical stenosis, left main renal artery, supplying a somewhat atrophic kidney. 6. Numerous additional findings, both vascular and nonvascular, as discussed above in great detail. Electronically Signed: Lacy Camacho  3/15/2023 2:24 PM EDT  Workstation ID: [id number]      Assessment / Plan      1.  Generalized abdominal pain  2.  Constipation unspecified  3.  Suspected IBS with constipation  4.  Tobacco abuse  Patient history, prior work-up and imaging studies and current symptoms are all more suggestive of irritable bowel syndrome with constipation.  She had an EGD, colonoscopy, PillCam study done in 2020 until [facility] which did not reveal any significant abnormalities.  Recent multiple CT scans abdomen did not reveal any other abnormality except peripheral vascular disease and aortic aneurysm.    We had a discussion regarding lifestyle changes and dietary changes including absolute abstinence from smoking and low gas-forming diet discussed  Advised Benefiber or Metamucil 1 scoop p.o. daily  We will increase the Linzess dose to 290 mcg p.o. daily  Bentyl low-dose 10 mg p.o. 3 times daily as needed for abdominal pain  We will get a celiac serology.    5.  Iron deficiency anemia   Lab work done in #2022 reviewed which reveals hemoglobin of 9.1 g/dL.  Looks like patient had a abdominal  aortic aneurysm surgery at that time and possibly this could be postsurgical.  No recent labs to compare.  We will get a CBC.  If any persistent anemia patient needs both EGD and colonoscopy for further evaluation.     6.  Dysphagia unspecified  She has intermittent dysphagia to solids especially in the throat.  Patient accounts this for her prior laryngeal surgery to remove polyp.  Not bothering her much.  EGD done in 2020 did not reveal any obvious esophageal stricture.  We will continue to monitor  May benefit with a low-dose PPI in the future.    7.  Family history of colon cancer  Sister had a colon cancer.  Last colonoscopy was in 2020 preparation suboptimal but no large polyps identified.  We will repeat colonoscopy in 2025 or earlier if she has any persistent anemia.      Follow Up:   Return in about 3 months (around 7/13/2023).    Rl Simmons MD  [facility] [place]  4/13/2023  18:48 EDT     Please note that portions of this note may have been completed with a voice recognition program.

## 2023-04-14 ENCOUNTER — TELEPHONE (OUTPATIENT)
Dept: CARDIOLOGY | Facility: CLINIC | Age: 73
End: 2023-04-14
Payer: MEDICARE

## 2023-04-14 LAB — IGA1 MFR SER: 213 MG/DL (ref 70–400)

## 2023-04-14 NOTE — TELEPHONE ENCOUNTER
----- Message from Claudia Gutierrez MA sent at 4/12/2023  5:45 PM EDT -----  I sent Ava message for sooner appointment. I have not called patient.   ----- Message -----  From: Nino Clements PA  Sent: 4/12/2023   3:24 PM EDT  To: Claudia Gutierrez MA    2 to 3-week follow-up

## 2023-04-15 LAB — TTG IGA SER-ACNC: <2 U/ML (ref 0–3)

## 2023-04-18 ENCOUNTER — TELEPHONE (OUTPATIENT)
Dept: CARDIOLOGY | Facility: CLINIC | Age: 73
End: 2023-04-18
Payer: MEDICARE

## 2023-04-18 NOTE — TELEPHONE ENCOUNTER
Pt verbalized understanding, requested me to call her sibling and inform her of these results due to having a forgetting problem.

## 2023-04-18 NOTE — PROGRESS NOTES
Telehealth E-Visit      Patient Name: Ro Walker  : 1950   MRN: [id number]     The patient has consented to a Telehealth Visit.     Chief Complaint:    Chief Complaint   Patient presents with   • Aortic Aneurysm     3 month follow up for an abdominal aortic aneurysm.       History of Present Illness:   Ro Walker is a very pleasant 73 y.o. female current smoker with history of cervical cancer s/p hysterectomy 2018 with Dr Dietrich, lupus on Plaquenil, LLE DVT , PVD, and AAA s/p EVAR with [place] excluder device via left femoral endarterectomy with pericardial patch on 2022 by Dr. Polk.  Patient had uneventful postoperative course and was discharged on POD #1 with no readmissions.    She was seen 2022 for postoperative eval with no issues with incisional healing but has not felt well since before surgery with several complaints and generalized fatigue. Of note increasing AAA was discovered during work-up for abdominal pain and constipation (hx of incarcerated inguinal hernia s/p open small bowel resection and repair with mesh 2020 Dr Lara; complicated by possible intraabdominal infection and  post-op GI bleed)  Patient was still struggling with these complaints when she was last seen but has since seen GI Dr Simmons with change to her bowel regiment and improvement in symptoms. She is being followed for anemia but has no further work-up planned currently. At our last visit she was also complaining of intermittent chest pain and stress testing demonstrated inferior ischemia with plans for cardiac CTA pending scheduling.   Unfortunately, she has picked up smoking again.    Subjective      Review of Systems:   Review of Systems   Constitutional: Positive for malaise/fatigue. Negative for chills, decreased appetite, diaphoresis, fever, night sweats, weight gain and weight loss.   HENT: Negative.  Negative for hoarse voice.    Eyes: Positive for blurred vision and double vision.  Negative for visual disturbance.   Cardiovascular: Positive for dyspnea on exertion and irregular heartbeat. Negative for chest pain, claudication, leg swelling, near-syncope, orthopnea, palpitations, paroxysmal nocturnal dyspnea and syncope.   Respiratory: Negative.  Negative for cough, hemoptysis, shortness of breath, sputum production and wheezing.    Hematologic/Lymphatic: Negative for adenopathy and bleeding problem. Bruises/bleeds easily.   Skin: Positive for poor wound healing. Negative for color change, nail changes and rash.   Musculoskeletal: Negative.  Negative for back pain, falls and muscle cramps.   Gastrointestinal: Negative.  Negative for abdominal pain, dysphagia and heartburn.   Genitourinary: Negative.  Negative for flank pain.   Neurological: Positive for dizziness and loss of balance. Negative for brief paralysis, disturbances in coordination, focal weakness, headaches, light-headedness, numbness, paresthesias, sensory change, vertigo and weakness.   Psychiatric/Behavioral: Positive for depression. Negative for suicidal ideas. The patient is nervous/anxious.    Allergic/Immunologic: Negative.  Negative for persistent infections.       I have reviewed the following portions of the patient's history: allergies, current medications, past family history, past medical history, past social history, past surgical history and problem list and confirm it's accurate.    Medications:     Current Outpatient Medications:   •  alendronate (FOSAMAX) 70 MG tablet, Take 1 tablet by mouth Every 7 (Seven) Days. Only takes 1 weekly -  only takes when remembers, Disp: , Rfl:   •  aspirin 81 MG EC tablet, Take 1 tablet by mouth Daily., Disp: 30 tablet, Rfl: 11  •  clopidogrel (PLAVIX) 75 MG tablet, Take 1 tablet by mouth Daily., Disp: , Rfl:   •  dicyclomine (BENTYL) 10 MG capsule, Take 1 capsule by mouth 3 (Three) Times a Day As Needed (abdominal pain)., Disp: 60 capsule, Rfl: 1  •  hydroxychloroquine (PLAQUENIL)  200 MG tablet, Take 1 tablet by mouth Daily., Disp: , Rfl:   •  linaclotide (LINZESS) 290 MCG capsule capsule, Take 1 capsule by mouth Every Morning Before Breakfast., Disp: 90 capsule, Rfl: 3  •  lisinopril (PRINIVIL,ZESTRIL) 40 MG tablet, Take 1 tablet by mouth Daily., Disp: , Rfl:   •  metoprolol tartrate (LOPRESSOR) 100 MG tablet, TAKE 1 TABLET ONE HOUR PRIOR TO CT, WILL BE ADVISED WHEN TO TAKE 2ND TABLET., Disp: 2 tablet, Rfl: 0  •  nitroglycerin (NITROSTAT) 0.4 MG SL tablet, 1 under the tongue as needed for angina, may repeat q5mins for up three doses, Disp: 25 tablet, Rfl: 11    Allergies:   Allergies   Allergen Reactions   • Tetanus Toxoids Itching and Rash   • Statins Unknown - Low Severity       Objective     Physical Exam: FAVIAN via telehealth   Vital Signs: There were no vitals filed for this visit.  There is no height or weight on file to calculate BMI.    Physical Exam    Imaging/Labs:  CT Angiogram Abdomen Pelvis (03/15/2023 09:19)  1. Patent aortobiiliac stent graft without obvious type II endoleak. There is some question as to the presence of a clinically insignificant type I endoleak on the left side, at or near the renal artery ostium. Please see above discussion.  2. Interim decrease, abdominal aortic aneurysm sac caliber, currently measuring 3.5 cm in maximum AP dimension on axial images.  3. Occluded left hypogastric arterial origin with reconstitution of a somewhat diminutive vessel.  4. Continued patency of the right main renal and hypogastric arteries.  5. Chronic occlusion/critical stenosis, left main renal artery, supplying a somewhat atrophic kidney.   6. Numerous additional findings, both vascular and nonvascular, as discussed above in great detail.    CT Abdomen Pelvis ([facility])-9/28/2022  Impression: Abdominal aortic aneurysm has increased in size by 1 cm in the last 2 years.  Now measures 4.5 cm.  Constipation pattern.  Small left kidneys, previously present on 6/21/2020.   Hysterectomy, also present previously.  Pelvic floor prolapse, another chronic finding.     CT Abdomen Pelvis ()-6/21/2020  Postoperative changes from recent inguinal hernia surgery. Lower lobe lobe consolidation bilaterally. Fluid collection wit hin the deep pelvis measuring 2.5 x 3.3 x 1.8 cm.  Fluid collection within the left anterior pelvis measuring 10.8 x 6.2 x  5.3 cm with tiny focus of air. Additional ancillary findings are unchanged.     IMPRESSION:  2 fluid collections within the pelvis, sterility of which cannot be confirmed by imaging.      CT Abdomen Pelvis -6/15/2020  Atherosclerotic abdominal aorta measuring up to 3.7 cm aneurysmal in the infrarenal portion.   1. Dilated fluid-filled small bowel within the left midabdomen extending to the lower midabdomen where there is anastomosis in the right lower midabdomen concerning for transition point and obstructive or at least partially obstructive gas pattern with postsurgical changes including extraluminal gas and fluid which should be correlated with surgical history to evaluate for appropriate time point, however no definitive peripherally enhancing focal fluid collection to suggest abscess, however, likely premature for development of definitive abscess given postsurgical changes. Limited evaluation of intraluminal contrast extent of involvement due to obstructive bowel gas pattern.  2. Opacifications of the lung bases right greater than left of moderate to severe atelectatic changes of subsegmental involvement with trace bilateral pleural effusions.  3. Asymmetric edema within the lower left abdomen and left thigh body wall tissues may represent postsurgical change given left inguinal soft tissue gas, however, underlying deep vein thrombosis could have a similar appearance and may be further evaluated with duplex.    Assessment / Plan      Assessment/Plan:  Diagnoses and all orders for this visit:    1. Infrarenal abdominal aortic aneurysm (AAA)  without rupture s/p EVAR 11/2022 (Primary)       • enlarging AAA s/p EVAR with [place] excluder device via left femoral endarterectomy with pericardial patch on 11/22/2022 by Dr. Polk  • Post-op imaging appointment with improvement in abdominal complaints after GI bowel regiment modification and no unusual back pain  • She continues to have uncontrolled hypertension and was instructed to discuss at cardiology follow-up   • Imaging demonstrates no native aneurysmal sac enlargement, migration of graft, or separation of device components. There is question of possible clinically insignificant type I endoleak. However, native sac down from ~4.5cm to 3.6cm on personal measurement   • Will continue with surveillance imaging  • Pt instructed to follow-up with cardiology for ongoing ischemic work-up and antihypertensive optimization     Follow Up:   Return in about 1 year (around 4/21/2024) for Imaging: CTA Abd/Pelvis.  Or sooner for any further concerns or worsening sign and symptoms. If unable to reach us in the office please dial 911 or go to the nearest emergency department.    This visit has been rescheduled as a phone visit to comply with patient safety concerns in accordance with CDC recommendations. Total time of discussion was 18 minutes.     VIRIDIANA Kelsey  [facility]

## 2023-04-18 NOTE — TELEPHONE ENCOUNTER
----- Message from Luiz Arevalo sent at 4/17/2023  4:06 PM EDT -----  Pt's appt is scheduled.  ----- Message -----  From: Claudia Gutierrez MA  Sent: 4/12/2023   5:45 PM EDT  To: Surjit Avila MA    I sent Ava message for sooner appointment. I have not called patient.   ----- Message -----  From: Nino Clements PA  Sent: 4/12/2023   3:24 PM EDT  To: Claudia Gutierrez MA    2 to 3-week follow-up

## 2023-04-18 NOTE — TELEPHONE ENCOUNTER
Caller: DEREK SILVA    Relationship: Emergency Contact    Best call back number: [phone]    What is the best time to reach you: ANY    Who are you requesting to speak with (clinical staff, provider,  specific staff member): ANY      What was the call regarding: THEY ARE NOT SURE THEY CAN MAKE THE APPOINTMENT ON T04.20.23 AND WOULD LIKE TO KNOW IF THEY CAN WAIT UNTIL THE September APPOINTMENT FOR THE TESTING FOLLOW UP.    Do you require a callback: YES.

## 2023-04-20 ENCOUNTER — OFFICE VISIT (OUTPATIENT)
Dept: CARDIOLOGY | Facility: CLINIC | Age: 73
End: 2023-04-20
Payer: MEDICARE

## 2023-04-20 VITALS
DIASTOLIC BLOOD PRESSURE: 80 MMHG | SYSTOLIC BLOOD PRESSURE: 103 MMHG | HEART RATE: 74 BPM | HEIGHT: 61 IN | OXYGEN SATURATION: 97 % | WEIGHT: 109.6 LBS | BODY MASS INDEX: 20.69 KG/M2

## 2023-04-20 DIAGNOSIS — R07.9 CHEST PAIN, UNSPECIFIED TYPE: ICD-10-CM

## 2023-04-20 DIAGNOSIS — R07.9 CHEST PAIN, UNSPECIFIED TYPE: Primary | ICD-10-CM

## 2023-04-20 DIAGNOSIS — I71.43 INFRARENAL ABDOMINAL AORTIC ANEURYSM (AAA) WITHOUT RUPTURE: Primary | ICD-10-CM

## 2023-04-20 DIAGNOSIS — R94.39 ABNORMAL NUCLEAR STRESS TEST: ICD-10-CM

## 2023-04-20 DIAGNOSIS — R00.2 PALPITATIONS: ICD-10-CM

## 2023-04-20 PROCEDURE — 99214 OFFICE O/P EST MOD 30 MIN: CPT | Performed by: PHYSICIAN ASSISTANT

## 2023-04-20 RX ORDER — METOPROLOL TARTRATE 100 MG/1
TABLET ORAL
Qty: 2 TABLET | Refills: 0 | Status: SHIPPED | OUTPATIENT
Start: 2023-04-20

## 2023-04-20 NOTE — PROGRESS NOTES
"Problem list     Subjective   Ro Walker is a 73 y.o. female     Chief Complaint   Patient presents with   • Follow-up     ABN TESTING F/U    Problem list   1.  Infrarenal abdominal aortic aneurysm status post endovascular repair November 2022 by Dr. Polk  2.  Hypertension  3.  Chronic tobacco use  4.  Chest pain  4.1 stress test April 2023 suggestive of inferior ischemia with preserved LV function  5.  Preserved systolic function  6.  Palpitations  6.1 event monitor April 2023 with no arrhythmias identified       HPI    Patient is a 73-year-old female who presents to the office to be evaluated.  She underwent evaluation because of chest pain and symptoms.    She occasionally gets chest discomfort on the left side of her chest.  It is not severe by any means but still noticeable.  It seems to correlate with palpitations.  She wore an event monitor but no significant arrhythmias were identified.  However, it seems to be random.    She does not complain of any significant dyspnea.  No PND orthopnea.    She does palpitate or feel a \"flutter\" at random times.  Again, that seems to occur concomitantly with her chest pain.  She does not describe any dizziness, presyncope or syncope.  She is stable otherwise.      Current Outpatient Medications on File Prior to Visit   Medication Sig Dispense Refill   • alendronate (FOSAMAX) 70 MG tablet Take 1 tablet by mouth Every 7 (Seven) Days. Only takes 1 weekly -  only takes when remembers     • aspirin 81 MG EC tablet Take 1 tablet by mouth Daily. 30 tablet 11   • clopidogrel (PLAVIX) 75 MG tablet Take 1 tablet by mouth Daily.     • dicyclomine (BENTYL) 10 MG capsule Take 1 capsule by mouth 3 (Three) Times a Day As Needed (abdominal pain). 60 capsule 1   • hydroxychloroquine (PLAQUENIL) 200 MG tablet Take 1 tablet by mouth Daily.     • linaclotide (LINZESS) 290 MCG capsule capsule Take 1 capsule by mouth Every Morning Before Breakfast. 90 capsule 3   • lisinopril " (PRINIVIL,ZESTRIL) 40 MG tablet Take 1 tablet by mouth Daily.     • nitroglycerin (NITROSTAT) 0.4 MG SL tablet 1 under the tongue as needed for angina, may repeat q5mins for up three doses 25 tablet 11     No current facility-administered medications on file prior to visit.       Tetanus toxoids and Statins    Past Medical History:   Diagnosis Date   • Abnormal ECG    • Abnormal Pap smear of cervix    • Arthritis    • Bowel obstruction     Hx bowel incarceration   • Cervical cancer    • Chronic laryngitis    • Dizzy    • DVT (deep venous thrombosis)    • Gastrointestinal hemorrhage with melena 06/21/2020    Added automatically from request for surgery [id number]   • GI hemorrhage    • H/O LLE DVT 6/2020 on ? Xarelto 06/21/2020   • Hoarseness of voice    • Hypertension    • Incarcerated inguinal hernia    • Incontinence of urine     more stress induced   • Lupus    • Osteoporosis    • Pneumonia of both lower lobes due to infectious organism 06/21/2020   • Polyp of vocal cord    • Postprocedural intraabdominal abscess 06/22/2020   • Retinal detachment    • Sensitive skin    • SLE (systemic lupus erythematosus)    • SOBOE (shortness of breath on exertion)    • Thrombocytopenia    • Vocal fold polyp    • Wears glasses     readers       Social History     Socioeconomic History   • Marital status:    • Number of children: 4   Tobacco Use   • Smoking status: Every Day     Packs/day: 1.00     Years: 57.00     Pack years: 57.00     Types: Cigarettes     Start date: 1966   • Smokeless tobacco: Never   Vaping Use   • Vaping Use: Never used   Substance and Sexual Activity   • Alcohol use: No   • Drug use: No   • Sexual activity: Defer       Family History   Problem Relation Age of Onset   • Heart attack Mother    • Stroke Father    • Stomach cancer Sister    • Cancer Brother    • Cancer Brother    • Colon cancer Neg Hx         Unknown       Review of Systems   Constitutional: Positive for fatigue. Negative for appetite  "change, chills and fever.   HENT: Negative for drooling, ear discharge, ear pain, facial swelling, postnasal drip, rhinorrhea, sinus pressure, sinus pain, sneezing and sore throat.    Eyes: Negative for pain, redness, itching and visual disturbance.   Respiratory: Negative for cough, choking and wheezing.    Cardiovascular: Negative for chest pain, palpitations and leg swelling.   Gastrointestinal: Negative for blood in stool, constipation, diarrhea, nausea and rectal pain.   Genitourinary: Negative.  Negative for difficulty urinating.   Musculoskeletal: Positive for arthralgias, back pain and neck pain.   Skin: Negative for rash and wound.   Neurological: Positive for dizziness. Negative for facial asymmetry, weakness and numbness.   Psychiatric/Behavioral: Negative for sleep disturbance.       Objective   Vitals:    04/20/23 1447   BP: 103/80   Pulse: 74   SpO2: 97%   Weight: 49.7 kg (109 lb 9.6 oz)   Height: 154.9 cm (60.98\")      /80   Pulse 74   Ht 154.9 cm (60.98\")   Wt 49.7 kg (109 lb 9.6 oz)   SpO2 97%   BMI 20.72 kg/m²     Lab Results (most recent)     None          Physical Exam  Vitals and nursing note reviewed.   Constitutional:       General: She is not in acute distress.     Appearance: Normal appearance. She is well-developed.   HENT:      Head: Normocephalic and atraumatic.   Eyes:      General: No scleral icterus.        Right eye: No discharge.         Left eye: No discharge.      Conjunctiva/sclera: Conjunctivae normal.   Neck:      Vascular: No carotid bruit.   Cardiovascular:      Rate and Rhythm: Normal rate and regular rhythm.      Heart sounds: Normal heart sounds. No murmur heard.    No friction rub. No gallop.   Pulmonary:      Effort: Pulmonary effort is normal. No respiratory distress.      Breath sounds: Normal breath sounds. No wheezing or rales.   Chest:      Chest wall: No tenderness.   Musculoskeletal:      Right lower leg: No edema.      Left lower leg: No edema. "   Skin:     General: Skin is warm and dry.      Coloration: Skin is not pale.      Findings: No erythema or rash.   Neurological:      Mental Status: She is alert and oriented to person, place, and time.      Cranial Nerves: No cranial nerve deficit.   Psychiatric:         Behavior: Behavior normal.         Procedure   Procedures       Assessment & Plan     Problems Addressed this Visit        Cardiac and Vasculature    Chest pain    Relevant Orders    CT Angiogram Heart With 3D Image    Palpitations    Relevant Orders    CT Angiogram Heart With 3D Image    Abnormal nuclear stress test    Relevant Orders    CT Angiogram Heart With 3D Image       Other    Infrarenal abdominal aortic aneurysm (AAA) without rupture s/p EVAR 11/2022 - Primary    Relevant Orders    CT Angiogram Heart With 3D Image   Diagnoses       Codes Comments    Infrarenal abdominal aortic aneurysm (AAA) without rupture s/p EVAR 11/2022    -  Primary ICD-10-CM: I71.43  ICD-9-CM: 441.4     Chest pain, unspecified type     ICD-10-CM: R07.9  ICD-9-CM: 786.50     Palpitations     ICD-10-CM: R00.2  ICD-9-CM: 785.1     Abnormal nuclear stress test     ICD-10-CM: R94.39  ICD-9-CM: 794.39           Recommendation  1.  Patient is a 73-year-old female who presents back for routine follow-up.  She has some atypical pain but palpitations as well.  She has an abnormal stress test showing inferior ischemia.  We discussed options with the patient.  She is interested in a cardiac CTA which I feel is reasonable.  It could be another noninvasive means to evaluate her coronary arteries.  If no obstructive disease is identified, we would recommend medical therapy.  If significant disease was noted, would recommend catheterization.  She is agreeable to the plan.    2.  Patient with palpitations on occasion with no significant arrhythmias identified.  She has no symptoms of malignant arrhythmia.  For now, we can monitor.    3.  Patient follows routinely with CT surgery in  regards to her aneurysm repair.  She is doing well from that standpoint.  She has issues in regards to the surgical site but apparently is supposed to see them tomorrow.  She does not describe any redness or drainage.  Primary there is some issue with it.  We will defer to them at this time.    4.  For now, we can see her back for follow-up after cardiac CTA to recommend further.  Follow-up with primary as scheduled.             Ro Walker  reports that she has been smoking cigarettes. She started smoking about 57 years ago. She has a 57.00 pack-year smoking history. She has never used smokeless tobacco..         Advance Care Planning   ACP discussion was declined by the patient. Patient has an advance directive in EMR which is still valid.         Electronically signed by:

## 2023-04-20 NOTE — LETTER
"April 20, 2023       No Recipients    Patient: Ro Walker   YOB: 1950   Date of Visit: 4/20/2023       Dear Alec Arellano MD    Ro Walker was in my office today. Below is a copy of my note.    If you have questions, please do not hesitate to call me. I look forward to following Ro along with you.         Sincerely,        MILKA Cueto        CC:   No Recipients    Problem list     Subjective    Ro Walker is a 73 y.o. female     Chief Complaint   Patient presents with   • Follow-up     ABN TESTING F/U    Problem list   1.  Infrarenal abdominal aortic aneurysm status post endovascular repair November 2022 by Dr. Polk  2.  Hypertension  3.  Chronic tobacco use  4.  Chest pain  4.1 stress test April 2023 suggestive of inferior ischemia with preserved LV function  5.  Preserved systolic function  6.  Palpitations  6.1 event monitor April 2023 with no arrhythmias identified       HPI    Patient is a 73-year-old female who presents to the office to be evaluated.  She underwent evaluation because of chest pain and symptoms.    She occasionally gets chest discomfort on the left side of her chest.  It is not severe by any means but still noticeable.  It seems to correlate with palpitations.  She wore an event monitor but no significant arrhythmias were identified.  However, it seems to be random.    She does not complain of any significant dyspnea.  No PND orthopnea.    She does palpitate or feel a \"flutter\" at random times.  Again, that seems to occur concomitantly with her chest pain.  She does not describe any dizziness, presyncope or syncope.  She is stable otherwise.      Current Outpatient Medications on File Prior to Visit   Medication Sig Dispense Refill   • alendronate (FOSAMAX) 70 MG tablet Take 1 tablet by mouth Every 7 (Seven) Days. Only takes 1 weekly -  only takes when remembers     • aspirin 81 MG EC tablet Take 1 tablet by mouth Daily. 30 tablet 11   • " clopidogrel (PLAVIX) 75 MG tablet Take 1 tablet by mouth Daily.     • dicyclomine (BENTYL) 10 MG capsule Take 1 capsule by mouth 3 (Three) Times a Day As Needed (abdominal pain). 60 capsule 1   • hydroxychloroquine (PLAQUENIL) 200 MG tablet Take 1 tablet by mouth Daily.     • linaclotide (LINZESS) 290 MCG capsule capsule Take 1 capsule by mouth Every Morning Before Breakfast. 90 capsule 3   • lisinopril (PRINIVIL,ZESTRIL) 40 MG tablet Take 1 tablet by mouth Daily.     • nitroglycerin (NITROSTAT) 0.4 MG SL tablet 1 under the tongue as needed for angina, may repeat q5mins for up three doses 25 tablet 11     No current facility-administered medications on file prior to visit.       Tetanus toxoids and Statins    Past Medical History:   Diagnosis Date   • Abnormal ECG    • Abnormal Pap smear of cervix    • Arthritis    • Bowel obstruction     Hx bowel incarceration   • Cervical cancer    • Chronic laryngitis    • Dizzy    • DVT (deep venous thrombosis)    • Gastrointestinal hemorrhage with melena 06/21/2020    Added automatically from request for surgery [id number]   • GI hemorrhage    • H/O LLE DVT 6/2020 on ? Xarelto 06/21/2020   • Hoarseness of voice    • Hypertension    • Incarcerated inguinal hernia    • Incontinence of urine     more stress induced   • Lupus    • Osteoporosis    • Pneumonia of both lower lobes due to infectious organism 06/21/2020   • Polyp of vocal cord    • Postprocedural intraabdominal abscess 06/22/2020   • Retinal detachment    • Sensitive skin    • SLE (systemic lupus erythematosus)    • SOBOE (shortness of breath on exertion)    • Thrombocytopenia    • Vocal fold polyp    • Wears glasses     readers       Social History     Socioeconomic History   • Marital status:    • Number of children: 4   Tobacco Use   • Smoking status: Every Day     Packs/day: 1.00     Years: 57.00     Pack years: 57.00     Types: Cigarettes     Start date: 1966   • Smokeless tobacco: Never   Vaping Use   • Vaping  "Use: Never used   Substance and Sexual Activity   • Alcohol use: No   • Drug use: No   • Sexual activity: Defer       Family History   Problem Relation Age of Onset   • Heart attack Mother    • Stroke Father    • Stomach cancer Sister    • Cancer Brother    • Cancer Brother    • Colon cancer Neg Hx         Unknown       Review of Systems   Constitutional: Positive for fatigue. Negative for appetite change, chills and fever.   HENT: Negative for drooling, ear discharge, ear pain, facial swelling, postnasal drip, rhinorrhea, sinus pressure, sinus pain, sneezing and sore throat.    Eyes: Negative for pain, redness, itching and visual disturbance.   Respiratory: Negative for cough, choking and wheezing.    Cardiovascular: Negative for chest pain, palpitations and leg swelling.   Gastrointestinal: Negative for blood in stool, constipation, diarrhea, nausea and rectal pain.   Genitourinary: Negative.  Negative for difficulty urinating.   Musculoskeletal: Positive for arthralgias, back pain and neck pain.   Skin: Negative for rash and wound.   Neurological: Positive for dizziness. Negative for facial asymmetry, weakness and numbness.   Psychiatric/Behavioral: Negative for sleep disturbance.       Objective    Vitals:    04/20/23 1447   BP: 103/80   Pulse: 74   SpO2: 97%   Weight: 49.7 kg (109 lb 9.6 oz)   Height: 154.9 cm (60.98\")      /80   Pulse 74   Ht 154.9 cm (60.98\")   Wt 49.7 kg (109 lb 9.6 oz)   SpO2 97%   BMI 20.72 kg/m²     Lab Results (most recent)       None            Physical Exam  Vitals and nursing note reviewed.   Constitutional:       General: She is not in acute distress.     Appearance: Normal appearance. She is well-developed.   HENT:      Head: Normocephalic and atraumatic.   Eyes:      General: No scleral icterus.        Right eye: No discharge.         Left eye: No discharge.      Conjunctiva/sclera: Conjunctivae normal.   Neck:      Vascular: No carotid bruit.   Cardiovascular:      Rate " and Rhythm: Normal rate and regular rhythm.      Heart sounds: Normal heart sounds. No murmur heard.    No friction rub. No gallop.   Pulmonary:      Effort: Pulmonary effort is normal. No respiratory distress.      Breath sounds: Normal breath sounds. No wheezing or rales.   Chest:      Chest wall: No tenderness.   Musculoskeletal:      Right lower leg: No edema.      Left lower leg: No edema.   Skin:     General: Skin is warm and dry.      Coloration: Skin is not pale.      Findings: No erythema or rash.   Neurological:      Mental Status: She is alert and oriented to person, place, and time.      Cranial Nerves: No cranial nerve deficit.   Psychiatric:         Behavior: Behavior normal.         Procedure    Procedures      Assessment & Plan     Problems Addressed this Visit          Cardiac and Vasculature    Chest pain    Relevant Orders    CT Angiogram Heart With 3D Image    Palpitations    Relevant Orders    CT Angiogram Heart With 3D Image    Abnormal nuclear stress test    Relevant Orders    CT Angiogram Heart With 3D Image       Other    Infrarenal abdominal aortic aneurysm (AAA) without rupture s/p EVAR 11/2022 - Primary    Relevant Orders    CT Angiogram Heart With 3D Image     Diagnoses         Codes Comments    Infrarenal abdominal aortic aneurysm (AAA) without rupture s/p EVAR 11/2022    -  Primary ICD-10-CM: I71.43  ICD-9-CM: 441.4     Chest pain, unspecified type     ICD-10-CM: R07.9  ICD-9-CM: 786.50     Palpitations     ICD-10-CM: R00.2  ICD-9-CM: 785.1     Abnormal nuclear stress test     ICD-10-CM: R94.39  ICD-9-CM: 794.39             Recommendation  1.  Patient is a 73-year-old female who presents back for routine follow-up.  She has some atypical pain but palpitations as well.  She has an abnormal stress test showing inferior ischemia.  We discussed options with the patient.  She is interested in a cardiac CTA which I feel is reasonable.  It could be another noninvasive means to evaluate her  coronary arteries.  If no obstructive disease is identified, we would recommend medical therapy.  If significant disease was noted, would recommend catheterization.  She is agreeable to the plan.    2.  Patient with palpitations on occasion with no significant arrhythmias identified.  She has no symptoms of malignant arrhythmia.  For now, we can monitor.    3.  Patient follows routinely with CT surgery in regards to her aneurysm repair.  She is doing well from that standpoint.  She has issues in regards to the surgical site but apparently is supposed to see them tomorrow.  She does not describe any redness or drainage.  Primary there is some issue with it.  We will defer to them at this time.    4.  For now, we can see her back for follow-up after cardiac CTA to recommend further.  Follow-up with primary as scheduled.            Ro MIGDALIA Walker  reports that she has been smoking cigarettes. She started smoking about 57 years ago. She has a 57.00 pack-year smoking history. She has never used smokeless tobacco..         Advance Care Planning   ACP discussion was declined by the patient. Patient has an advance directive in EMR which is still valid.        Electronically signed by:

## 2023-04-21 ENCOUNTER — OFFICE VISIT (OUTPATIENT)
Dept: CARDIAC SURGERY | Facility: CLINIC | Age: 73
End: 2023-04-21
Payer: MEDICARE

## 2023-04-21 DIAGNOSIS — I71.43 INFRARENAL ABDOMINAL AORTIC ANEURYSM (AAA) WITHOUT RUPTURE: Primary | ICD-10-CM

## 2023-04-26 DIAGNOSIS — R94.39 ABNORMAL NUCLEAR STRESS TEST: Primary | ICD-10-CM

## 2023-05-18 DIAGNOSIS — R07.9 CHEST PAIN, UNSPECIFIED TYPE: ICD-10-CM

## 2023-05-18 RX ORDER — METOPROLOL TARTRATE 100 MG/1
TABLET ORAL
Qty: 2 TABLET | Refills: 0 | Status: SHIPPED | OUTPATIENT
Start: 2023-05-18

## 2023-08-15 ENCOUNTER — HOSPITAL ENCOUNTER (OUTPATIENT)
Dept: CT IMAGING | Facility: HOSPITAL | Age: 73
Discharge: HOME | End: 2023-08-15
Admitting: PHYSICIAN ASSISTANT
Payer: MEDICARE

## 2023-08-15 VITALS
OXYGEN SATURATION: 99 % | SYSTOLIC BLOOD PRESSURE: 136 MMHG | HEIGHT: 61 IN | WEIGHT: 100 LBS | HEART RATE: 52 BPM | BODY MASS INDEX: 18.88 KG/M2 | DIASTOLIC BLOOD PRESSURE: 77 MMHG | RESPIRATION RATE: 18 BRPM

## 2023-08-15 DIAGNOSIS — I71.43 INFRARENAL ABDOMINAL AORTIC ANEURYSM (AAA) WITHOUT RUPTURE: ICD-10-CM

## 2023-08-15 DIAGNOSIS — R00.2 PALPITATIONS: ICD-10-CM

## 2023-08-15 DIAGNOSIS — R07.9 CHEST PAIN, UNSPECIFIED TYPE: ICD-10-CM

## 2023-08-15 DIAGNOSIS — R94.39 ABNORMAL NUCLEAR STRESS TEST: ICD-10-CM

## 2023-08-15 LAB — CREAT BLDA-MCNC: 0.8 MG/DL (ref 0.6–1.3)

## 2023-08-15 PROCEDURE — A9270 NON-COVERED ITEM OR SERVICE: HCPCS | Performed by: RADIOLOGY

## 2023-08-15 PROCEDURE — 25510000001 IOPAMIDOL PER 1 ML: Performed by: PHYSICIAN ASSISTANT

## 2023-08-15 PROCEDURE — 75574 CT ANGIO HRT W/3D IMAGE: CPT

## 2023-08-15 PROCEDURE — 82565 ASSAY OF CREATININE: CPT

## 2023-08-15 PROCEDURE — 63710000001 NITROGLYCERIN 0.4 MG SUBLINGUAL TABLET: Performed by: RADIOLOGY

## 2023-08-15 RX ORDER — SODIUM CHLORIDE 0.9 % (FLUSH) 0.9 %
10 SYRINGE (ML) INJECTION AS NEEDED
Status: DISCONTINUED | OUTPATIENT
Start: 2023-08-15 | End: 2023-08-16 | Stop reason: HOSPADM

## 2023-08-15 RX ORDER — NITROGLYCERIN 0.4 MG/1
0.4 TABLET SUBLINGUAL
Status: DISCONTINUED | OUTPATIENT
Start: 2023-08-15 | End: 2023-08-16 | Stop reason: HOSPADM

## 2023-08-15 RX ORDER — METOPROLOL TARTRATE 50 MG/1
50 TABLET, FILM COATED ORAL ONCE AS NEEDED
Status: DISCONTINUED | OUTPATIENT
Start: 2023-08-15 | End: 2023-08-16 | Stop reason: HOSPADM

## 2023-08-15 RX ORDER — METOPROLOL TARTRATE 100 MG/1
100 TABLET ORAL ONCE AS NEEDED
Status: DISCONTINUED | OUTPATIENT
Start: 2023-08-15 | End: 2023-08-16 | Stop reason: HOSPADM

## 2023-08-15 RX ADMIN — IOPAMIDOL 86 ML: 755 INJECTION, SOLUTION INTRAVENOUS at 08:41

## 2023-08-15 RX ADMIN — NITROGLYCERIN 0.4 MG: 0.4 TABLET, ORALLY DISINTEGRATING SUBLINGUAL at 08:47

## 2023-08-24 ENCOUNTER — TELEPHONE (OUTPATIENT)
Dept: CARDIOLOGY | Facility: CLINIC | Age: 73
End: 2023-08-24
Payer: MEDICARE

## 2023-08-24 NOTE — TELEPHONE ENCOUNTER
CT Angiogram Coronary   Informed pt of no acute findings or abnormalities. 8/24/2023    ----- Message from Luiz Arevalo Rep sent at 8/24/2023 11:41 AM EDT -----    ----- Message -----  From: Dena Richard RegSched Rep  Sent: 8/21/2023   1:52 PM EDT  To: Archie Rubi MA      ----- Message -----  From: Maria Fernanda Farley MA  Sent: 8/16/2023   4:20 PM EDT  To: KIANA Thao      ----- Message -----  From: Nino Clements PA  Sent: 8/16/2023   4:16 PM EDT  To: Maria Fernanda Farley MA    Routine follow-up

## 2023-08-28 ENCOUNTER — OFFICE VISIT (OUTPATIENT)
Dept: GASTROENTEROLOGY | Facility: CLINIC | Age: 73
End: 2023-08-28
Payer: MEDICARE

## 2023-08-28 VITALS
DIASTOLIC BLOOD PRESSURE: 75 MMHG | BODY MASS INDEX: 20.01 KG/M2 | SYSTOLIC BLOOD PRESSURE: 165 MMHG | HEART RATE: 73 BPM | WEIGHT: 106 LBS | HEIGHT: 61 IN | TEMPERATURE: 98 F

## 2023-08-28 DIAGNOSIS — K58.1 IRRITABLE BOWEL SYNDROME WITH CONSTIPATION: Primary | ICD-10-CM

## 2023-08-28 DIAGNOSIS — D50.9 IRON DEFICIENCY ANEMIA, UNSPECIFIED IRON DEFICIENCY ANEMIA TYPE: ICD-10-CM

## 2023-08-28 DIAGNOSIS — R10.84 GENERALIZED ABDOMINAL PAIN: ICD-10-CM

## 2023-08-28 PROCEDURE — 99213 OFFICE O/P EST LOW 20 MIN: CPT | Performed by: INTERNAL MEDICINE

## 2023-08-28 PROCEDURE — 1160F RVW MEDS BY RX/DR IN RCRD: CPT | Performed by: INTERNAL MEDICINE

## 2023-08-28 PROCEDURE — 1159F MED LIST DOCD IN RCRD: CPT | Performed by: INTERNAL MEDICINE

## 2023-08-28 RX ORDER — LATANOPROST 50 UG/ML
1 SOLUTION/ DROPS OPHTHALMIC NIGHTLY
COMMUNITY
Start: 2023-06-08

## 2023-08-28 NOTE — PROGRESS NOTES
Follow Up Note     Date: 2023   Patient Name: Ro Walker  MRN: [id number]  : 1950     Referring Physician: Alec Arellano MD    Chief Complaint:    Chief Complaint   Patient presents with    Abdominal Pain    Anemia    Constipation    Difficulty Swallowing       Interval History:   2023  Ro Walker is a 73 y.o. female who is here today for follow up for ongoing abdominal pain constipation and anemia.  States that her bowel movements have regularized now with the Linzess.  No significant abdominal pain nausea.  She has cut down her smoking.  She has been noticing more discoloration of her both forearms recently.  She thought discontinuing the Plavix will help however she still haa a issue.     2023  She states that she has been having issues with generalized abdominal pain and constipation for many years now which is bothering her more now.  She gets the pain intermittently mostly in the mid abdomen once in few days.  She has a longstanding history of constipation and was being started on a Linzess 145 mcg p.o. daily in the past..  She has been doing well initially but later on it did not help currently having bowel movement once in 2 to 3 days hard stool despite on current dose of Linzess.  She denies any nausea vomiting.  She does have a mild abdominal bloating.     She has a occasional difficulty in swallowing mostly confined to the throat.  She states that this developed after she had a surgery for laryngeal polyp.  Denies any significant reflux symptoms.  She is currently on aspirin and the Plavix.  She is a chronic smoker.  Denies any alcohol consumption.  Her sister had a colon cancer and brother had some type of cancer.     Patient was admitted in [facility] in [facility] for incarcerated inguinal hernia and had to undergo surgery.  She had a postprocedure bleeding.  She did have a evaluation done for GI bleeding with a EGD colonoscopy and PillCam study  and those did not reveal any obvious source of bleeding except possible  anastomotic site bleeding from small bowel resection.  She also had a abdominal arctic aneurysm repair with endograft in November 2022.  As per record she also has a history of lupus        Subjective      Past Medical History:   Past Medical History:   Diagnosis Date    Abnormal ECG     Abnormal Pap smear of cervix     Arthritis     Bowel obstruction     Hx bowel incarceration    Cervical cancer     Chronic laryngitis     Dizzy     DVT (deep venous thrombosis)     Gastrointestinal hemorrhage with melena 06/21/2020    Added automatically from request for surgery [id number]    GI hemorrhage     H/O LLE DVT 6/2020 on ? Xarelto 06/21/2020    Hoarseness of voice     Hypertension     Incarcerated inguinal hernia     Incontinence of urine     more stress induced    Lupus     Osteoporosis     Pneumonia of both lower lobes due to infectious organism 06/21/2020    Polyp of vocal cord     Postprocedural intraabdominal abscess 06/22/2020    Retinal detachment     Sensitive skin     SLE (systemic lupus erythematosus)     SOBOE (shortness of breath on exertion)     Thrombocytopenia     Vocal fold polyp     Wears glasses     readers     Past Surgical History:   Past Surgical History:   Procedure Laterality Date    ABDOMINAL AORTIC ANEURYSM REPAIR WITH ENDOGRAFT N/A 11/22/2022    Procedure: ABDOMINAL AORTIC ANEURYSM REPAIR WITH ENDOGRAFT, LEFT FEMORAL ENDARTERECTOMY WITH PATCH ANGIOPLASTY;  Surgeon: Nikko Polk MD;  [facility];  Service: Cardiothoracic;  Laterality: N/A;  MGY: 341  FLUORO: 18.48  ISOVUE 300: 60M    CATARACT EXTRACTION Left 2008    CERVICAL CONE BIOPSY      CERVICAL CONE BIOPSY      COLON RESECTION SMALL BOWEL N/A 06/13/2020    Procedure: COLON RESECTION SMALL BOWEL;  Surgeon: Huy Lara MD;  [facility];  Service: General;  Laterality: N/A;    COLONOSCOPY N/A 06/23/2020    Procedure: COLONOSCOPY;   Surgeon: Kareem Feldman MD;  Location:  [facility];  Service: Gastroenterology;  Laterality: N/A;    ENDOSCOPY Left 06/22/2020    Procedure: ESOPHAGOGASTRODUODENOSCOPY;  Surgeon: Kareem Feldman MD;  Location:  [facility];  Service: Gastroenterology;  Laterality: Left;    INGUINAL HERNIA REPAIR Left 06/13/2020    Procedure: INGUINAL HERNIA REPAIR LEFT;  Surgeon: Huy Lara MD;  Location:  [facility];  Service: General;  Laterality: Left;    LAPAROSCOPIC ASSISTED VAGINAL HYSTERECTOMY N/A 02/26/2018    Procedure: LAPAROSCOPIC ASSISTED VAGINAL HYSTERECTOMY, BILATERAL SALPINGO OOPHORECTOMY;  Surgeon: María Dietrich MD;  Location:  [facility];  Service:     RETINAL DETACHMENT REPAIR Left 2006    THROAT SURGERY  2017    polyps removed    TUBAL ABDOMINAL LIGATION         Family History:   Family History   Problem Relation Age of Onset    Heart attack Mother     Stroke Father     Stomach cancer Sister     Cancer Brother     Cancer Brother     Colon cancer Neg Hx         Unknown       Social History:   Social History     Socioeconomic History    Marital status:     Number of children: 4   Tobacco Use    Smoking status: Every Day     Packs/day: 1.00     Years: 57.00     Pack years: 57.00     Types: Cigarettes     Start date: 1966    Smokeless tobacco: Never   Vaping Use    Vaping Use: Never used   Substance and Sexual Activity    Alcohol use: No    Drug use: No    Sexual activity: Defer       Medications:     Current Outpatient Medications:     alendronate (FOSAMAX) 70 MG tablet, Take 1 tablet by mouth Every 7 (Seven) Days. Only takes 1 weekly -  only takes when remembers, Disp: , Rfl:     aspirin 81 MG EC tablet, Take 1 tablet by mouth Daily., Disp: 30 tablet, Rfl: 11    hydroxychloroquine (PLAQUENIL) 200 MG tablet, Take 1 tablet by mouth Daily., Disp: , Rfl:     latanoprost (XALATAN) 0.005 % ophthalmic solution, Administer 1 drop into the left eye Every Night., Disp: , Rfl:     linaclotide  "(LINZESS) 290 MCG capsule capsule, Take 1 capsule by mouth Every Morning Before Breakfast., Disp: 90 capsule, Rfl: 3    lisinopril (PRINIVIL,ZESTRIL) 40 MG tablet, Take 1 tablet by mouth Daily., Disp: , Rfl:     nitroglycerin (NITROSTAT) 0.4 MG SL tablet, 1 under the tongue as needed for angina, may repeat q5mins for up three doses, Disp: 25 tablet, Rfl: 11    dicyclomine (BENTYL) 10 MG capsule, Take 1 capsule by mouth 3 (Three) Times a Day As Needed (abdominal pain). (Patient not taking: Reported on 8/28/2023), Disp: 60 capsule, Rfl: 1    Allergies:   Allergies   Allergen Reactions    Tetanus Toxoids Itching and Rash    Statins Unknown - Low Severity       Review of Systems:   Review of Systems   Constitutional:  Positive for fatigue. Negative for appetite change, fever and unexpected weight loss.   HENT:  Negative for trouble swallowing.    Gastrointestinal:  Negative for abdominal distention, abdominal pain, anal bleeding, blood in stool, constipation, diarrhea, nausea, rectal pain, vomiting, GERD and indigestion.     The following portions of the patient's history were reviewed and updated as appropriate: allergies, current medications, past family history, past medical history, past social history, past surgical history and problem list.    Objective     Physical Exam:  Vital Signs:   Vitals:    08/28/23 1634   BP: 165/75   Pulse: 73   Temp: 98 øF (36.7 øC)   TempSrc: Infrared   Weight: 48.1 kg (106 lb)   Height: 154.9 cm (61\")  Comment: per patient       Physical Exam  Constitutional:       Appearance: Normal appearance.   HENT:      Head: Normocephalic and atraumatic.   Eyes:      Conjunctiva/sclera: Conjunctivae normal.   Abdominal:      General: Abdomen is flat. There is no distension.      Palpations: There is no mass.      Tenderness: There is no abdominal tenderness. There is no guarding or rebound.      Hernia: No hernia is present.   Musculoskeletal:      Cervical back: Normal range of motion and neck " supple.   Neurological:      Mental Status: She is alert.       Results Review:   I reviewed the patient's new clinical results.    Hospital Outpatient Visit on 08/15/2023   Component Date Value Ref Range Status    Creatinine 08/15/2023 0.80  0.60 - 1.30 mg/dL Final    Serial Number: [id number]:  [id number]        Result Date: 3/15/2023  Impression: 1. Patent aortobiiliac stent graft without obvious type II endoleak. There is some question as to the presence of a clinically insignificant type I endoleak on the left side, at or near the renal artery ostium. Please see above discussion. 2. Interim decrease, abdominal aortic aneurysm sac caliber, currently measuring 3.5 cm in maximum AP dimension on axial images. 3. Occluded left hypogastric arterial origin with reconstitution of a somewhat diminutive vessel. 4. Continued patency of the right main renal and hypogastric arteries. 5. Chronic occlusion/critical stenosis, left main renal artery, supplying a somewhat atrophic kidney. 6. Numerous additional findings, both vascular and nonvascular, as discussed above in great detail. Electronically Signed: Lacy Camacho  3/15/2023 2:24 PM EDT  Workstation ID: [id number]    CT Angiogram Coronary    Result Date: 8/15/2023  1.  The mid RCA is not evaluated due to motion artifact. 2.  Mild left main calcification. 3.  At least some mild calcifications noted of the mid distal acute marginal branch.  This report was finalized on 8/15/2023 12:25 PM by Dr. Rex Mann MD.       Assessment / Plan      1.  Chronic idiopathic constipation versus suspected IBS with constipation  2.  History of generalized abdominal pain  3  Tobacco abuse  4.  Skin rash both forearm/suspected lupus related  5.  Iron deficiency anemia  8/28/2023  She is doing very well after increasing the dose of Linzess to 290 mcg p.o. daily.  She has been having daily bowel movement without any abdominal pain now.  She is not taking Metamucil.  She rarely takes Bentyl  now.  Her celiac serology was negative.  Lab work done on 8/15/2023 revealed hemoglobin of 11.5 g/dL improved from 9.1 g/dL November 2022.  She had a GI bleed and likely had a blood loss anemia during the her surgery Nov 2022.  No history suggestive any GI bleed ongoing current.  No reflux symptoms.    We will continue Linzess to 90 mcg p.o. daily  Bentyl 10 mg p.o. twice daily as needed  Patient is a lab work scheduled with the PCP will follow    She has been noticing progressive skin rash both forearm with dark discoloration.  She is not on any anticoagulation now except aspirin 81 mg p.o. daily.  This is more likely lupus related.  Advised to follow-up with rheumatology.    4/13/2023  Patient history, prior work-up and imaging studies and current symptoms are all more suggestive of irritable bowel syndrome with constipation chronically with constipation.  She had an EGD, colonoscopy, PillCam study done in 2020 until [facility] which did not reveal any significant abnormalities.  Recent multiple CT scans abdomen did not reveal any other abnormality except peripheral vascular disease and aortic aneurysm.     Prior history  6.  Dysphagia unspecified  She has intermittent dysphagia to solids especially in the throat.  Patient accounts this for her prior laryngeal surgery to remove polyp.  Not bothering her much.  EGD done in 2020 did not reveal any obvious esophageal stricture.  We will continue to monitor. May benefit with a low-dose PPI in the future.     7.  Family history of colon cancer  Sister had a colon cancer.  Last colonoscopy was in 2020 preparation suboptimal but no large polyps identified.  We will repeat colonoscopy in 2025 or earlier if she has any persistent anemia.           Follow Up:   No follow-ups on file.    Rl Simmons MD  [facility]astroenter[facility] [address]  8/28/2023  16:36 EDT     Please note that portions of this note may have been completed with a voice recognition program.

## 2023-09-11 ENCOUNTER — OFFICE VISIT (OUTPATIENT)
Dept: CARDIOLOGY | Facility: CLINIC | Age: 73
End: 2023-09-11
Payer: MEDICARE

## 2023-09-11 VITALS
HEART RATE: 63 BPM | DIASTOLIC BLOOD PRESSURE: 80 MMHG | BODY MASS INDEX: 20.01 KG/M2 | SYSTOLIC BLOOD PRESSURE: 197 MMHG | HEIGHT: 61 IN | OXYGEN SATURATION: 95 % | WEIGHT: 106 LBS

## 2023-09-11 DIAGNOSIS — I10 ESSENTIAL HYPERTENSION: ICD-10-CM

## 2023-09-11 DIAGNOSIS — I71.43 INFRARENAL ABDOMINAL AORTIC ANEURYSM (AAA) WITHOUT RUPTURE: Primary | ICD-10-CM

## 2023-09-11 DIAGNOSIS — R07.9 CHEST PAIN, UNSPECIFIED TYPE: ICD-10-CM

## 2023-09-11 PROCEDURE — 3077F SYST BP >= 140 MM HG: CPT | Performed by: PHYSICIAN ASSISTANT

## 2023-09-11 PROCEDURE — 3079F DIAST BP 80-89 MM HG: CPT | Performed by: PHYSICIAN ASSISTANT

## 2023-09-11 PROCEDURE — 99214 OFFICE O/P EST MOD 30 MIN: CPT | Performed by: PHYSICIAN ASSISTANT

## 2023-09-11 RX ORDER — PREDNISONE 5 MG/1
TABLET ORAL
COMMUNITY
Start: 2023-09-08

## 2023-09-11 NOTE — LETTER
September 11, 2023       No Recipients    Patient: Ro Walker   YOB: 1950   Date of Visit: 9/11/2023       Dear Alec Arellano MD    Ro Walker was in my office today. Below is a copy of my note.    If you have questions, please do not hesitate to call me. I look forward to following Ro along with you.         Sincerely,        MILKA Cueto        CC:   No Recipients    Problem list     Subjective  Ro Walker is a 73 y.o. female     Chief Complaint   Patient presents with   • Follow-up     CT   Problem list   1.  Infrarenal abdominal aortic aneurysm status post endovascular repair November 2022 by Dr. Polk  2.  Hypertension  3.  Chronic tobacco use  4.  Chest pain  4.1 stress test April 2023 suggestive of inferior ischemia with preserved LV function  4.2 coronary CTA August 2023 with no obstructive disease identified or significant stenosis.  5.  Preserved systolic function  6.  Palpitations  6.1 event monitor April 2023 with no arrhythmias identified  7.  Lupus    HPI    Patient is a 73-year-old female who presents back to the office to follow-up.  She underwent a coronary CTA as apparently there was an inferior ischemic defect on stress testing.  She wanted to consider less invasive approach and underwent coronary CTA with no obstructive disease identified.    She has done well.  She occasionally might sense a slight heaviness in her chest mainly in the right upper chest.  This seems to be on occasion but has since improved.  She was diagnosed with lupus and placed on steroids and has had improvement of her symptoms.  She has a degree of dyspnea that is mild.  She does not describe progressive shortness of breath.  No PND or orthopnea.    She does not describe palpitating nor does she complain of dysrhythmic symptoms.  She has done well otherwise.      Current Outpatient Medications on File Prior to Visit   Medication Sig Dispense Refill   • alendronate (FOSAMAX) 70  MG tablet Take 1 tablet by mouth Every 7 (Seven) Days. Only takes 1 weekly -  only takes when remembers     • aspirin 81 MG EC tablet Take 1 tablet by mouth Daily. 30 tablet 11   • hydroxychloroquine (PLAQUENIL) 200 MG tablet Take 1 tablet by mouth Daily.     • latanoprost (XALATAN) 0.005 % ophthalmic solution Administer 1 drop into the left eye Every Night.     • linaclotide (LINZESS) 290 MCG capsule capsule Take 1 capsule by mouth Every Morning Before Breakfast. 90 capsule 3   • lisinopril (PRINIVIL,ZESTRIL) 40 MG tablet Take 1 tablet by mouth Daily.     • nitroglycerin (NITROSTAT) 0.4 MG SL tablet 1 under the tongue as needed for angina, may repeat q5mins for up three doses 25 tablet 11   • predniSONE (DELTASONE) 5 MG tablet 3 tablets in the morning for one week, then 2 in the morning for one week,, then1 in the morning for one week, then stop.     • [DISCONTINUED] dicyclomine (BENTYL) 10 MG capsule Take 1 capsule by mouth 3 (Three) Times a Day As Needed (abdominal pain). 60 capsule 1     No current facility-administered medications on file prior to visit.       Tetanus toxoids and Statins    Past Medical History:   Diagnosis Date   • Abnormal ECG    • Abnormal Pap smear of cervix    • Arthritis    • Bowel obstruction     Hx bowel incarceration   • Cervical cancer    • Chronic laryngitis    • Dizzy    • DVT (deep venous thrombosis)    • Gastrointestinal hemorrhage with melena 06/21/2020    Added automatically from request for surgery [id number]   • GI hemorrhage    • H/O LLE DVT 6/2020 on ? Xarelto 06/21/2020   • Hoarseness of voice    • Hypertension    • Incarcerated inguinal hernia    • Incontinence of urine     more stress induced   • Lupus    • Osteoporosis    • Pneumonia of both lower lobes due to infectious organism 06/21/2020   • Polyp of vocal cord    • Postprocedural intraabdominal abscess 06/22/2020   • Retinal detachment    • Sensitive skin    • SLE (systemic lupus erythematosus)    • SOBOE (shortness of  "breath on exertion)    • Thrombocytopenia    • Vocal fold polyp    • Wears glasses     readers       Social History     Socioeconomic History   • Marital status:    • Number of children: 4   Tobacco Use   • Smoking status: Every Day     Packs/day: 1.00     Years: 57.00     Pack years: 57.00     Types: Cigarettes     Start date: 1966   • Smokeless tobacco: Never   Vaping Use   • Vaping Use: Never used   Substance and Sexual Activity   • Alcohol use: No   • Drug use: No   • Sexual activity: Defer       Family History   Problem Relation Age of Onset   • Heart attack Mother    • Stroke Father    • Stomach cancer Sister    • Cancer Brother    • Cancer Brother    • Colon cancer Neg Hx         Unknown       Review of Systems   Constitutional:  Positive for fatigue.   HENT: Negative.     Eyes: Negative.  Negative for visual disturbance.   Respiratory:  Positive for shortness of breath. Negative for apnea, cough, chest tightness and wheezing.    Cardiovascular: Negative.  Negative for chest pain, palpitations and leg swelling.   Gastrointestinal: Negative.  Negative for blood in stool.   Endocrine: Negative.    Genitourinary: Negative.  Negative for hematuria.   Musculoskeletal: Negative.    Skin: Negative.  Negative for color change, rash and wound.   Allergic/Immunologic: Negative.    Neurological:  Positive for dizziness. Negative for syncope, weakness, light-headedness, numbness and headaches.   Hematological:  Bruises/bleeds easily.   Psychiatric/Behavioral: Negative.  Negative for sleep disturbance.      Objective  Vitals:    09/11/23 1106   BP: (!) 197/80   BP Location: Left arm   Patient Position: Sitting   Cuff Size: Adult   Pulse: 63   SpO2: 95%   Weight: 48.1 kg (106 lb)   Height: 154.9 cm (61\")      BP (!) 197/80 (BP Location: Left arm, Patient Position: Sitting, Cuff Size: Adult)   Pulse 63   Ht 154.9 cm (61\")   Wt 48.1 kg (106 lb)   SpO2 95%   BMI 20.03 kg/m²     Lab Results (most recent)       None "            Physical Exam  Vitals and nursing note reviewed.   Constitutional:       General: She is not in acute distress.     Appearance: Normal appearance. She is well-developed.   HENT:      Head: Normocephalic and atraumatic.   Eyes:      General: No scleral icterus.        Right eye: No discharge.         Left eye: No discharge.      Conjunctiva/sclera: Conjunctivae normal.   Neck:      Vascular: No carotid bruit.   Cardiovascular:      Rate and Rhythm: Normal rate and regular rhythm.      Heart sounds: Normal heart sounds. No murmur heard.    No friction rub. No gallop.   Pulmonary:      Effort: Pulmonary effort is normal. No respiratory distress.      Breath sounds: Normal breath sounds. No wheezing or rales.   Chest:      Chest wall: No tenderness.   Musculoskeletal:      Right lower leg: No edema.      Left lower leg: No edema.   Skin:     General: Skin is warm and dry.      Coloration: Skin is not pale.      Findings: No erythema or rash.   Neurological:      Mental Status: She is alert and oriented to person, place, and time.      Cranial Nerves: No cranial nerve deficit.   Psychiatric:         Behavior: Behavior normal.       Procedure  Procedures       Assessment & Plan    Problems Addressed this Visit          Cardiac and Vasculature    Chest pain    Essential hypertension       Other    Infrarenal abdominal aortic aneurysm (AAA) without rupture s/p EVAR 11/2022 - Primary     Diagnoses         Codes Comments    Infrarenal abdominal aortic aneurysm (AAA) without rupture s/p EVAR 11/2022    -  Primary ICD-10-CM: I71.43  ICD-9-CM: 441.4     Chest pain, unspecified type     ICD-10-CM: R07.9  ICD-9-CM: 786.50     Essential hypertension     ICD-10-CM: I10  ICD-9-CM: 401.9           Recommendation  1.  Patient is a 73-year-old female who presents to the office to be evaluated.  Patient has atypical chest pain that has improved it seems with steroid therapy.  I will consider noncardiac etiologies.  For now,  with history of almost normal coronaries by CTA, nothing further from our standpoint.    2.  Patient with infrarenal abdominal aortic aneurysm.  I would recommend close blood pressure management which we discussed.  She follows with CT surgery.    3.  They describe patient's blood pressure being much better at home or outside the office.  She read a blood pressure reading off in which her systolic blood pressure was in the 120s.  She apparently did not take her medication until later in the day today.  When taking her medication appropriately, it appears to run well.  I would like for them to continue to monitor.  If it continues to be elevated, we may have to consider adjusting her medicines.    4.  Otherwise, she appears clinically stable.  We can see her back for follow-up in 6 months to year or sooner as symptoms discussed.  Follow-up with primary as scheduled.             Ro Walker  reports that she has been smoking cigarettes. She started smoking about 57 years ago. She has a 57.00 pack-year smoking history. She has never used smokeless tobacco.. I have educated her on the risk of diseases from using tobacco products such as cancer, COPD, and heart disease.     I advised her to quit and she is not willing to quit.    I spent 3  minutes counseling the patient.       Patient did not bring med list or medicine bottles to appointment, med list has been reviewed and updated based on patient's knowledge of their meds.      Advance Care Planning   ACP discussion was declined by the patient. Patient does not have an advance directive, declines further assistance.      Electronically signed by:

## 2023-09-11 NOTE — PROGRESS NOTES
Problem list     Subjective   Ro Walker is a 73 y.o. female     Chief Complaint   Patient presents with    Follow-up     CT   Problem list   1.  Infrarenal abdominal aortic aneurysm status post endovascular repair November 2022 by Dr. Polk  2.  Hypertension  3.  Chronic tobacco use  4.  Chest pain  4.1 stress test April 2023 suggestive of inferior ischemia with preserved LV function  4.2 coronary CTA August 2023 with no obstructive disease identified or significant stenosis.  5.  Preserved systolic function  6.  Palpitations  6.1 event monitor April 2023 with no arrhythmias identified  7.  Lupus    HPI    Patient is a 73-year-old female who presents back to the office to follow-up.  She underwent a coronary CTA as apparently there was an inferior ischemic defect on stress testing.  She wanted to consider less invasive approach and underwent coronary CTA with no obstructive disease identified.    She has done well.  She occasionally might sense a slight heaviness in her chest mainly in the right upper chest.  This seems to be on occasion but has since improved.  She was diagnosed with lupus and placed on steroids and has had improvement of her symptoms.  She has a degree of dyspnea that is mild.  She does not describe progressive shortness of breath.  No PND or orthopnea.    She does not describe palpitating nor does she complain of dysrhythmic symptoms.  She has done well otherwise.      Current Outpatient Medications on File Prior to Visit   Medication Sig Dispense Refill    alendronate (FOSAMAX) 70 MG tablet Take 1 tablet by mouth Every 7 (Seven) Days. Only takes 1 weekly -  only takes when remembers      aspirin 81 MG EC tablet Take 1 tablet by mouth Daily. 30 tablet 11    hydroxychloroquine (PLAQUENIL) 200 MG tablet Take 1 tablet by mouth Daily.      latanoprost (XALATAN) 0.005 % ophthalmic solution Administer 1 drop into the left eye Every Night.      linaclotide (LINZESS) 290 MCG capsule capsule Take 1  capsule by mouth Every Morning Before Breakfast. 90 capsule 3    lisinopril (PRINIVIL,ZESTRIL) 40 MG tablet Take 1 tablet by mouth Daily.      nitroglycerin (NITROSTAT) 0.4 MG SL tablet 1 under the tongue as needed for angina, may repeat q5mins for up three doses 25 tablet 11    predniSONE (DELTASONE) 5 MG tablet 3 tablets in the morning for one week, then 2 in the morning for one week,, then1 in the morning for one week, then stop.      [DISCONTINUED] dicyclomine (BENTYL) 10 MG capsule Take 1 capsule by mouth 3 (Three) Times a Day As Needed (abdominal pain). 60 capsule 1     No current facility-administered medications on file prior to visit.       Tetanus toxoids and Statins    Past Medical History:   Diagnosis Date    Abnormal ECG     Abnormal Pap smear of cervix     Arthritis     Bowel obstruction     Hx bowel incarceration    Cervical cancer     Chronic laryngitis     Dizzy     DVT (deep venous thrombosis)     Gastrointestinal hemorrhage with melena 06/21/2020    Added automatically from request for surgery [id number]    GI hemorrhage     H/O LLE DVT 6/2020 on ? Xarelto 06/21/2020    Hoarseness of voice     Hypertension     Incarcerated inguinal hernia     Incontinence of urine     more stress induced    Lupus     Osteoporosis     Pneumonia of both lower lobes due to infectious organism 06/21/2020    Polyp of vocal cord     Postprocedural intraabdominal abscess 06/22/2020    Retinal detachment     Sensitive skin     SLE (systemic lupus erythematosus)     SOBOE (shortness of breath on exertion)     Thrombocytopenia     Vocal fold polyp     Wears glasses     readers       Social History     Socioeconomic History    Marital status:     Number of children: 4   Tobacco Use    Smoking status: Every Day     Packs/day: 1.00     Years: 57.00     Pack years: 57.00     Types: Cigarettes     Start date: 1966    Smokeless tobacco: Never   Vaping Use    Vaping Use: Never used   Substance and Sexual Activity    Alcohol  "use: No    Drug use: No    Sexual activity: Defer       Family History   Problem Relation Age of Onset    Heart attack Mother     Stroke Father     Stomach cancer Sister     Cancer Brother     Cancer Brother     Colon cancer Neg Hx         Unknown       Review of Systems   Constitutional:  Positive for fatigue.   HENT: Negative.     Eyes: Negative.  Negative for visual disturbance.   Respiratory:  Positive for shortness of breath. Negative for apnea, cough, chest tightness and wheezing.    Cardiovascular: Negative.  Negative for chest pain, palpitations and leg swelling.   Gastrointestinal: Negative.  Negative for blood in stool.   Endocrine: Negative.    Genitourinary: Negative.  Negative for hematuria.   Musculoskeletal: Negative.    Skin: Negative.  Negative for color change, rash and wound.   Allergic/Immunologic: Negative.    Neurological:  Positive for dizziness. Negative for syncope, weakness, light-headedness, numbness and headaches.   Hematological:  Bruises/bleeds easily.   Psychiatric/Behavioral: Negative.  Negative for sleep disturbance.      Objective   Vitals:    09/11/23 1106   BP: (!) 197/80   BP Location: Left arm   Patient Position: Sitting   Cuff Size: Adult   Pulse: 63   SpO2: 95%   Weight: 48.1 kg (106 lb)   Height: 154.9 cm (61\")      BP (!) 197/80 (BP Location: Left arm, Patient Position: Sitting, Cuff Size: Adult)   Pulse 63   Ht 154.9 cm (61\")   Wt 48.1 kg (106 lb)   SpO2 95%   BMI 20.03 kg/m²     Lab Results (most recent)       None            Physical Exam  Vitals and nursing note reviewed.   Constitutional:       General: She is not in acute distress.     Appearance: Normal appearance. She is well-developed.   HENT:      Head: Normocephalic and atraumatic.   Eyes:      General: No scleral icterus.        Right eye: No discharge.         Left eye: No discharge.      Conjunctiva/sclera: Conjunctivae normal.   Neck:      Vascular: No carotid bruit.   Cardiovascular:      Rate and " Rhythm: Normal rate and regular rhythm.      Heart sounds: Normal heart sounds. No murmur heard.    No friction rub. No gallop.   Pulmonary:      Effort: Pulmonary effort is normal. No respiratory distress.      Breath sounds: Normal breath sounds. No wheezing or rales.   Chest:      Chest wall: No tenderness.   Musculoskeletal:      Right lower leg: No edema.      Left lower leg: No edema.   Skin:     General: Skin is warm and dry.      Coloration: Skin is not pale.      Findings: No erythema or rash.   Neurological:      Mental Status: She is alert and oriented to person, place, and time.      Cranial Nerves: No cranial nerve deficit.   Psychiatric:         Behavior: Behavior normal.       Procedure   Procedures       Assessment & Plan     Problems Addressed this Visit          Cardiac and Vasculature    Chest pain    Essential hypertension       Other    Infrarenal abdominal aortic aneurysm (AAA) without rupture s/p EVAR 11/2022 - Primary     Diagnoses         Codes Comments    Infrarenal abdominal aortic aneurysm (AAA) without rupture s/p EVAR 11/2022    -  Primary ICD-10-CM: I71.43  ICD-9-CM: 441.4     Chest pain, unspecified type     ICD-10-CM: R07.9  ICD-9-CM: 786.50     Essential hypertension     ICD-10-CM: I10  ICD-9-CM: 401.9           Recommendation  1.  Patient is a 73-year-old female who presents to the office to be evaluated.  Patient has atypical chest pain that has improved it seems with steroid therapy.  I will consider noncardiac etiologies.  For now, with history of almost normal coronaries by CTA, nothing further from our standpoint.    2.  Patient with infrarenal abdominal aortic aneurysm.  I would recommend close blood pressure management which we discussed.  She follows with CT surgery.    3.  They describe patient's blood pressure being much better at home or outside the office.  She read a blood pressure reading off in which her systolic blood pressure was in the 120s.  She apparently did  not take her medication until later in the day today.  When taking her medication appropriately, it appears to run well.  I would like for them to continue to monitor.  If it continues to be elevated, we may have to consider adjusting her medicines.    4.  Otherwise, she appears clinically stable.  We can see her back for follow-up in 6 months to year or sooner as symptoms discussed.  Follow-up with primary as scheduled.             Ro Walker  reports that she has been smoking cigarettes. She started smoking about 57 years ago. She has a 57.00 pack-year smoking history. She has never used smokeless tobacco.. I have educated her on the risk of diseases from using tobacco products such as cancer, COPD, and heart disease.     I advised her to quit and she is not willing to quit.    I spent 3  minutes counseling the patient.       Patient did not bring med list or medicine bottles to appointment, med list has been reviewed and updated based on patient's knowledge of their meds.      Advance Care Planning   ACP discussion was declined by the patient. Patient does not have an advance directive, declines further assistance.      Electronically signed by:

## 2024-02-19 ENCOUNTER — OFFICE VISIT (OUTPATIENT)
Dept: GASTROENTEROLOGY | Facility: CLINIC | Age: 74
End: 2024-02-19
Payer: MEDICARE

## 2024-02-19 VITALS
HEIGHT: 61 IN | HEART RATE: 69 BPM | SYSTOLIC BLOOD PRESSURE: 180 MMHG | DIASTOLIC BLOOD PRESSURE: 90 MMHG | WEIGHT: 108 LBS | BODY MASS INDEX: 20.39 KG/M2 | TEMPERATURE: 97.8 F

## 2024-02-19 DIAGNOSIS — K58.1 IRRITABLE BOWEL SYNDROME WITH CONSTIPATION: Primary | ICD-10-CM

## 2024-02-19 DIAGNOSIS — D50.9 CHRONIC IRON DEFICIENCY ANEMIA: ICD-10-CM

## 2024-02-19 PROCEDURE — 1160F RVW MEDS BY RX/DR IN RCRD: CPT | Performed by: INTERNAL MEDICINE

## 2024-02-19 PROCEDURE — 3077F SYST BP >= 140 MM HG: CPT | Performed by: INTERNAL MEDICINE

## 2024-02-19 PROCEDURE — 99214 OFFICE O/P EST MOD 30 MIN: CPT | Performed by: INTERNAL MEDICINE

## 2024-02-19 PROCEDURE — 3080F DIAST BP >= 90 MM HG: CPT | Performed by: INTERNAL MEDICINE

## 2024-02-19 PROCEDURE — 1159F MED LIST DOCD IN RCRD: CPT | Performed by: INTERNAL MEDICINE

## 2024-02-19 NOTE — PROGRESS NOTES
Follow Up Note     Date: 2024   Patient Name: Ro Walker  MRN: [id number]  : 1950     Referring Physician: Alec Arellano MD    Chief Complaint:    Chief Complaint   Patient presents with    Abdominal Pain    IBS-C    Anemia     Iron Deficiency         Interval History:   2024  Ro Walker is a 73 y.o. female who is here today for follow up for her constipation and abdominal pain and anemia.  States that she has been doing very well with a daily bowel movement with the Linzess.  She was seen by rheumatology for her skin rash and was diagnosed with lupus.  Denies any significant abdominal pain.  Denies any blood in the stool.     2023  She states that she has been having issues with generalized abdominal pain and constipation for many years now which is bothering her more now.  She gets the pain intermittently mostly in the mid abdomen once in few days.  She has a longstanding history of constipation and was being started on a Linzess 145 mcg p.o. daily in the past..  She has been doing well initially but later on it did not help currently having bowel movement once in 2 to 3 days hard stool despite on current dose of Linzess.  She denies any nausea vomiting.  She does have a mild abdominal bloating.     She has a occasional difficulty in swallowing mostly confined to the throat.  She states that this developed after she had a surgery for laryngeal polyp.  Denies any significant reflux symptoms.  She is currently on aspirin and the Plavix.  She is a chronic smoker.  Denies any alcohol consumption.  Her sister had a colon cancer and brother had some type of cancer.     Patient was admitted in [facility] in 2020 for incarcerated inguinal hernia and had to undergo surgery.  She had a postprocedure bleeding.  She did have a evaluation done for GI bleeding with a EGD colonoscopy and PillCam study and those did not reveal any obvious source of bleeding except  possible  anastomotic site bleeding from small bowel resection.  She also had a abdominal arctic aneurysm repair with endograft in November 2022.  As per record she also has a history of lupus  Subjective      Past Medical History:   Past Medical History:   Diagnosis Date    Abnormal ECG     Abnormal Pap smear of cervix     Arthritis     Bowel obstruction     Hx bowel incarceration    Cervical cancer     Chronic laryngitis     Dizzy     DVT (deep venous thrombosis)     Gastrointestinal hemorrhage with melena 06/21/2020    Added automatically from request for surgery [id number]    GI hemorrhage     H/O LLE DVT 6/2020 on ? Xarelto 06/21/2020    Hoarseness of voice     Hypertension     Incarcerated inguinal hernia     Incontinence of urine     more stress induced    Lupus     Osteoporosis     Pneumonia of both lower lobes due to infectious organism 06/21/2020    Polyp of vocal cord     Postprocedural intraabdominal abscess 06/22/2020    Retinal detachment     Sensitive skin     SLE (systemic lupus erythematosus)     SOBOE (shortness of breath on exertion)     Thrombocytopenia     Vocal fold polyp     Wears glasses     readers     Past Surgical History:   Past Surgical History:   Procedure Laterality Date    ABDOMINAL AORTIC ANEURYSM REPAIR WITH ENDOGRAFT N/A 11/22/2022    Procedure: ABDOMINAL AORTIC ANEURYSM REPAIR WITH ENDOGRAFT, LEFT FEMORAL ENDARTERECTOMY WITH PATCH ANGIOPLASTY;  Surgeon: Nikko Polk MD;  Location: [facility] HYBRID DEANDRE;  Service: Cardiothoracic;  Laterality: N/A;  MGY: 341  FLUORO: 18.48  ISOVUE 300: 60M    CATARACT EXTRACTION Left 2008    CERVICAL CONE BIOPSY      CERVICAL CONE BIOPSY      COLON RESECTION SMALL BOWEL N/A 06/13/2020    Procedure: COLON RESECTION SMALL BOWEL;  Surgeon: Huy Lara MD;  Location: [facility] OR;  Service: General;  Laterality: N/A;    COLONOSCOPY N/A 06/23/2020    Procedure: COLONOSCOPY;  Surgeon: Kareem Feldman MD;  Location: [facility] ENDOSCOPY;  Service:  Gastroenterology;  Laterality: N/A;    ENDOSCOPY Left 06/22/2020    Procedure: ESOPHAGOGASTRODUODENOSCOPY;  Surgeon: Kareem Feldman MD;  Location:  [facility] ENDOSCOPY;  Service: Gastroenterology;  Laterality: Left;    INGUINAL HERNIA REPAIR Left 06/13/2020    Procedure: INGUINAL HERNIA REPAIR LEFT;  Surgeon: Huy Lara MD;  Location:  [facility] OR;  Service: General;  Laterality: Left;    LAPAROSCOPIC ASSISTED VAGINAL HYSTERECTOMY N/A 02/26/2018    Procedure: LAPAROSCOPIC ASSISTED VAGINAL HYSTERECTOMY, BILATERAL SALPINGO OOPHORECTOMY;  Surgeon: María Dietrich MD;  Location:  [facility] OR;  Service:     RETINAL DETACHMENT REPAIR Left 2006    THROAT SURGERY  2017    polyps removed    TUBAL ABDOMINAL LIGATION         Family History:   Family History   Problem Relation Age of Onset    Heart attack Mother     Stroke Father     Stomach cancer Sister     Cancer Brother     Cancer Brother     Colon cancer Neg Hx         Unknown       Social History:   Social History     Socioeconomic History    Marital status:     Number of children: 4   Tobacco Use    Smoking status: Every Day     Packs/day: 0.50     Years: 57.00     Additional pack years: 0.00     Total pack years: 28.50     Types: Cigarettes     Start date: 1966    Smokeless tobacco: Never   Vaping Use    Vaping Use: Never used   Substance and Sexual Activity    Alcohol use: No    Drug use: No    Sexual activity: Defer       Medications:     Current Outpatient Medications:     alendronate (FOSAMAX) 70 MG tablet, Take 1 tablet by mouth Every 7 (Seven) Days. Only takes 1 weekly -  only takes when remembers, Disp: , Rfl:     aspirin 81 MG EC tablet, Take 1 tablet by mouth Daily., Disp: 30 tablet, Rfl: 11    hydroxychloroquine (PLAQUENIL) 200 MG tablet, Take 1 tablet by mouth Daily., Disp: , Rfl:     latanoprost (XALATAN) 0.005 % ophthalmic solution, Administer 1 drop into the left eye Every Night., Disp: , Rfl:     linaclotide (LINZESS) 290 MCG capsule capsule,  "Take 1 capsule by mouth Every Morning Before Breakfast., Disp: 90 capsule, Rfl: 3    lisinopril (PRINIVIL,ZESTRIL) 40 MG tablet, Take 1 tablet by mouth Daily., Disp: , Rfl:     nitroglycerin (NITROSTAT) 0.4 MG SL tablet, 1 under the tongue as needed for angina, may repeat q5mins for up three doses, Disp: 25 tablet, Rfl: 11    Allergies:   Allergies   Allergen Reactions    Tetanus Toxoids Itching and Rash    Statins Unknown - Low Severity       Review of Systems:   Review of Systems   Constitutional:  Negative for appetite change, fatigue, fever and unexpected weight loss.   HENT:  Negative for trouble swallowing.    Gastrointestinal:  Negative for abdominal distention, abdominal pain, anal bleeding, blood in stool, constipation, diarrhea, nausea, rectal pain, vomiting, GERD and indigestion.       The following portions of the patient's history were reviewed and updated as appropriate: allergies, current medications, past family history, past medical history, past social history, past surgical history and problem list.    Objective     Physical Exam:  Vital Signs:   Vitals:    02/19/24 1320 02/19/24 1323   BP: (!) 206/76  Comment: left arm, automatic 180/90  Comment: manual   Pulse: 69    Temp: 97.8 °F (36.6 °C)    TempSrc: Infrared    Weight: 49 kg (108 lb)  Comment: with clothing/shoes    Height: 154.9 cm (61\")  Comment: per patient        Physical Exam  Constitutional:       Appearance: Normal appearance.   HENT:      Head: Normocephalic and atraumatic.   Eyes:      Conjunctiva/sclera: Conjunctivae normal.   Abdominal:      General: Abdomen is flat. There is no distension.      Palpations: There is no mass.      Tenderness: There is no abdominal tenderness. There is no guarding or rebound.      Hernia: No hernia is present.   Musculoskeletal:      Cervical back: Normal range of motion and neck supple.   Neurological:      Mental Status: She is alert.         Results Review:   I reviewed the patient's new clinical " results.    No visits with results within 90 Day(s) from this visit.   Latest known visit with results is:   Hospital Outpatient Visit on 08/15/2023   Component Date Value Ref Range Status    Creatinine 08/15/2023 0.80  0.60 - 1.30 mg/dL Final    Serial Number: [id number]:  [id number]      No radiology results for the last 90 days.    Result Date: 3/15/2023  Impression: 1. Patent aortobiiliac stent graft without obvious type II endoleak. There is some question as to the presence of a clinically insignificant type I endoleak on the left side, at or near the renal artery ostium. Please see above discussion. 2. Interim decrease, abdominal aortic aneurysm sac caliber, currently measuring 3.5 cm in maximum AP dimension on axial images. 3. Occluded left hypogastric arterial origin with reconstitution of a somewhat diminutive vessel. 4. Continued patency of the right main renal and hypogastric arteries. 5. Chronic occlusion/critical stenosis, left main renal artery, supplying a somewhat atrophic kidney. 6. Numerous additional findings, both vascular and nonvascular, as discussed above in great detail.   Assessment / Plan      1.  Chronic idiopathic constipation versus suspected IBS with constipation  2.  Chronic iron deficiency anemia  3.  History of incarcerated inguinal hernia and small bowel resection  2/29/2024  She has been having more or less daily bowel movement with the Linzess.  No significant abdominal pain now.  She continues to smoke.  Patient gets lab work at PCPs office and currently no recent labs available.  Last lab work done in April 2020 revealed hemoglobin of 11.5 g/dL.  No history suggestive any GI bleed.    Patient was to get lab work done at PCPs office  Recommend CBC CMP iron studies and to fax a copy of the report to our office  Iron pills if hemoglobin is less than 10 g/dL  Continue Linzess 2 nine 0 mcg p.o. daily    Her blood pressure is running high this visit and also last visit.  Advised to  call the PCP soon to and/or optimize her blood pressure medications.    4/13/2023  Patient history, prior work-up and imaging studies and current symptoms are all more suggestive of irritable bowel syndrome with constipation chronically with constipation.  She had an EGD, colonoscopy, PillCam study done in 2020 until [facility] which did not reveal any significant abnormalities.  Prior celiac serology was negative in 2023. Recent multiple CT scans abdomen did not reveal any other abnormality except peripheral vascular disease and aortic aneurysm.     Prior history  4.  Dysphagia unspecified  She has intermittent dysphagia to solids especially in the throat.  Patient accounts this for her prior laryngeal surgery to remove polyp.  Not bothering her much.  EGD done in 2020 did not reveal any obvious esophageal stricture.     5.  Family history of colon cancer  Sister had a colon cancer.  Last colonoscopy was in 2020 preparation suboptimal but no large polyps identified. We will repeat colonoscopy in 2025 or earlier if she has any persistent anemia.       Follow Up:   No follow-ups on file.    Rl Simmons MD  [facility]  2/19/2024  13:26 EST     Please note that portions of this note may have been completed with a voice recognition program.

## 2024-03-14 DIAGNOSIS — I71.43 INFRARENAL ABDOMINAL AORTIC ANEURYSM (AAA) WITHOUT RUPTURE: Primary | ICD-10-CM

## 2024-04-24 ENCOUNTER — HOSPITAL ENCOUNTER (OUTPATIENT)
Dept: CT IMAGING | Facility: HOSPITAL | Age: 74
Discharge: HOME | End: 2024-04-24
Admitting: NURSE PRACTITIONER
Payer: MEDICARE

## 2024-04-24 DIAGNOSIS — I71.43 INFRARENAL ABDOMINAL AORTIC ANEURYSM (AAA) WITHOUT RUPTURE: ICD-10-CM

## 2024-04-24 LAB — CREAT BLDA-MCNC: 0.6 MG/DL (ref 0.6–1.3)

## 2024-04-24 PROCEDURE — 82565 ASSAY OF CREATININE: CPT

## 2024-04-24 PROCEDURE — 25510000001 IOPAMIDOL PER 1 ML: Performed by: NURSE PRACTITIONER

## 2024-04-24 PROCEDURE — 74174 CTA ABD&PLVS W/CONTRAST: CPT

## 2024-04-24 RX ADMIN — IOPAMIDOL 85 ML: 755 INJECTION, SOLUTION INTRAVENOUS at 11:16

## 2024-05-09 PROBLEM — D69.59 THROMBOCYTOPENIA DUE TO COVID-19 VIRUS: Status: ACTIVE | Noted: 2024-05-09

## 2024-05-09 PROBLEM — R53.83 FATIGUE: Status: ACTIVE | Noted: 2024-05-09

## 2024-05-09 PROBLEM — Z79.899 HIGH RISK MEDICATION USE: Status: ACTIVE | Noted: 2024-05-09

## 2024-05-09 PROBLEM — L93.2 CUTANEOUS LUPUS ERYTHEMATOSUS: Status: ACTIVE | Noted: 2024-05-09

## 2024-05-09 PROBLEM — U07.1 THROMBOCYTOPENIA DUE TO COVID-19 VIRUS: Status: ACTIVE | Noted: 2024-05-09

## 2024-05-09 PROBLEM — M32.8 OTHER FORMS OF SYSTEMIC LUPUS ERYTHEMATOSUS: Status: ACTIVE | Noted: 2024-05-09

## 2024-05-09 NOTE — ASSESSMENT & PLAN NOTE
Fosamax for 6 years in the past.  Prolia [id number].    1/2023 DEXA stable.   Did well with Prolia but can't afford it. Has not had prolia since 2017.   She worsened on Fosamax. Compliance was an issue.   She is going to get Prolia through her PCP.   Continue weight bearing exercise  Continue calcium and vitamin D supplementation  Repeat DEXA 1/2025   Risks of prolia discussed including AVN of jaw, bone pain, changes in calcium level, and infections. Injection reactions can occur.

## 2024-05-09 NOTE — ASSESSMENT & PLAN NOTE
Rash is better. One small lesion on forehead when she was gardening without a hat.   Fatigue has increased.   Plaquenil is effective and well tolerated.  Pt's global disease score is 7 on a 10 pt scale. Pain score 7/10.  Labs today  Take Plaquenil q day.    I will see in 4 months

## 2024-05-09 NOTE — ASSESSMENT & PLAN NOTE
Plaquenil.  She will need every 12 month ocular exam.  Eye exam is up-to-date and no toxicity has been seen.

## 2024-05-09 NOTE — PROGRESS NOTES
Office Follow Up      Date: 05/13/2024   Patient Name: Ro Walker  MRN: [id number]  YOB: 1950    Referring Physician: Referring, Self     Chief Complaint: Systemic lupus      History of Present Illness: Ro Walker is a 74 y.o. female who is here today for follow up on systemic lupus erythematosus.  Had cutaneous lupus rash on forehead. She has been working on her garden.    Continues on Plaquenil. No side effects. Eye exams have been stable. Per pt eye exam is up to date.   She has been having problems with memory.   She has been off Fosamax. Prolia would need preference.   No oral ulcers, alopecia, Raynaud's, joint pain, or fever.  Had aortic aneurysm surgery at [facility] 2023.  She also had 5 cardiac stents.  Memory has been worse since her  Surgery.     Systems do not include cardiac, CNS/PNS, GI, pulmonary, renal and cardiovascular  Systems involved include, hematopoietic, integumentary and musculoskeletal.  The problem is stable.  Risk factors include female gender.  Symptoms are aggravated by stress and UV/light exposure.  Symptoms not aggravated by certain foods, chemicals/toxins, drugs, non-compliance to regimen or smoking.  Relieving factors include antimalarials, corticosteroids, protection from sun/UV and rest. Relieving factors do not include aspirin. Relevant history includes positive NATANAEL, positive dsDNA antibodies, low complements and SSA/SSB. Relevant history does not include ECG changes, positive Walker antibodies, positive phospholipid antibodies, proteinuria, renal insufficiency or RNP.  Associated symptoms include fatigue, thrombocytopenia, unusual hair loss and vision changes.  Pertinent negatives include arthralgias, discoid rash, dizziness, headache, leukopenia, malaise, malar rash, muscle weakness, nephritis, pericarditis, photosensitivity, pleuritic pain, proteinuria, Raynaud's phenomenon, seizures and unexplained fever.    Subjective   Review of  Systems: Review of Systems   Constitutional:  Positive for chills, unexpected weight gain and unexpected weight loss. Negative for fatigue and fever.   HENT:  Negative for dental problem, mouth sores, sinus pressure and swollen glands.    Eyes:  Negative for photophobia, pain and redness.   Respiratory:  Positive for cough. Negative for apnea, chest tightness and shortness of breath.    Cardiovascular:  Negative for chest pain and leg swelling.   Gastrointestinal:  Negative for abdominal pain, diarrhea, nausea and GERD.   Endocrine: Positive for cold intolerance.   Genitourinary:  Negative for dysuria and hematuria.   Musculoskeletal:         Morning stiffness     Skin:  Positive for bruise. Negative for dry skin, rash and skin lesions.        Facial Lupus Rash   Neurological:  Positive for tremors, weakness, numbness and headache. Negative for dizziness, seizures, syncope and memory problem.   Hematological:  Negative for adenopathy. Does not bruise/bleed easily.   Psychiatric/Behavioral:  Negative for sleep disturbance, suicidal ideas, depressed mood and stress. The patient is not nervous/anxious.         Medications:   Current Outpatient Medications:     alendronate (FOSAMAX) 70 MG tablet, Take 1 tablet by mouth Every 7 (Seven) Days. Only takes 1 weekly -  only takes when remembers, Disp: , Rfl:     aspirin 81 MG EC tablet, Take 1 tablet by mouth Daily., Disp: 30 tablet, Rfl: 11    dicyclomine (BENTYL) 10 MG capsule, Take 1 capsule by mouth Daily As Needed., Disp: , Rfl:     hydroxychloroquine (PLAQUENIL) 200 MG tablet, Take 1 tablet by mouth Daily for 150 days. Indications: Systemic Lupus Erythematosus, Disp: 30 tablet, Rfl: 4    latanoprost (XALATAN) 0.005 % ophthalmic solution, Administer 1 drop into the left eye Every Night., Disp: , Rfl:     linaclotide (LINZESS) 290 MCG capsule capsule, Take 1 capsule by mouth Every Morning Before Breakfast., Disp: 90 capsule, Rfl: 3    lisinopril (PRINIVIL,ZESTRIL) 40 MG  "tablet, Take 1 tablet by mouth Daily., Disp: , Rfl:     metoprolol succinate XL (TOPROL-XL) 25 MG 24 hr tablet, Take 1 tablet by mouth Daily., Disp: , Rfl:     nitroglycerin (NITROSTAT) 0.4 MG SL tablet, 1 under the tongue as needed for angina, may repeat q5mins for up three doses, Disp: 25 tablet, Rfl: 11    Allergies:   Allergies   Allergen Reactions    Tetanus Toxoids Itching and Rash    Statins Unknown - Low Severity       I have reviewed and updated the patient's chief complaint, history of present illness, review of systems, past medical history, surgical history, family history, social history, medications and allergy list as appropriate.     Objective    Vital Signs:   Vitals:    05/13/24 1137   BP: 142/86   Pulse: 57   Temp: 98 °F (36.7 °C)   Weight: 47.8 kg (105 lb 6.4 oz)   Height: 154.9 cm (60.98\")   PainSc:   7   PainLoc: Leg     Body mass index is 19.93 kg/m².    Physical Exam:  GENERAL: The patient is well developed and well nourished.  Cooperative and oriented times three.  Affect is normal.  Hydration appears normal.    HEENT: Normocephalic and atraumatic.  No notable alopecia.  No facial rash.  Lids and conjunctiva are normal.  Pupils are equal and sclera are clear.  I see no oral or nasal ulcers.  Lips, teeth, and gums are within normal limits.     NECK: Neck is supple without adenopathy, masses, or thyromegaly.    LUNGS: Effort is normal. Clear to auscultation bilaterally.  CARDIOVASCULAR: Normal S1, S2.  No murmurs are heard.  ABDOMEN: soft and nontender without HSM.   EXTREMITIES: There is no edema.   I see no cyanosis or clubbing.    SKIN: 1 cm lesion on forehead.   NEUROLOGIC: Gait is normal.  MUSCULOSKELETAL: Complete joint exam was performed.  There is full range of motion of the shoulders, elbows, wrists, and hands without notable deformities, soft tissue swelling, synovitis, or atrophy. Osteoarthritic changes are seen in the PIP and DIP joints.  Hips have good flexion and internal and " external rotation.  Knees have no palpable effusions.  There is full extension and full flexion of the knees without pain.  Ankles have no soft tissue swelling, synovitis, or major deformities.    BACK: Straight without notable scoliosis.        Assessment / Plan   Assessment & Plan  Other forms of systemic lupus erythematosus, unspecified organ involvement status  Rash is better. One small lesion on forehead when she was gardening without a hat.   Fatigue has increased.   Plaquenil is effective and well tolerated.  Pt's global disease score is 7 on a 10 pt scale. Pain score 7/10.  Labs today  Take Plaquenil q day.    I will see in 4 months  Cutaneous lupus erythematosus  One forehead lesion. Avoid sun exposure.   High risk medication use  Plaquenil.  She will need every 12 month ocular exam.  Eye exam is up-to-date and no toxicity has been seen.  Age related osteoporosis, unspecified pathological fracture presence  Fosamax for 6 years in the past.  Prolia [id number].    1/2023 DEXA stable.   Did well with Prolia but can't afford it. Has not had prolia since 2017.   She worsened on Fosamax. Compliance was an issue.   She is going to get Prolia through her PCP.   Continue weight bearing exercise  Continue calcium and vitamin D supplementation  Repeat DEXA 1/2025   Risks of prolia discussed including AVN of jaw, bone pain, changes in calcium level, and infections. Injection reactions can occur.  Fatigue, unspecified type  Stable  Thrombocytopenia due to COVID-19 virus  Baseline appears to be approximately 100,000-130,000.  Stable on most recent lab.    Orders Placed This Encounter   Procedures    C3 Complement    C4 Complement    CBC Auto Differential    Comprehensive Metabolic Panel    dsDNA Antibody by IFA, Crihermelindaa luciliaguzman, with Reflex to Titer    Urinalysis With Culture If Indicated -     New Medications Ordered This Visit   Medications    hydroxychloroquine (PLAQUENIL) 200 MG tablet     Sig: Take 1 tablet by  mouth Daily for 150 days. Indications: Systemic Lupus Erythematosus     Dispense:  30 tablet     Refill:  4          Follow Up:   No follow-ups on file.        Dvaid Allen MD  [facility]

## 2024-05-13 ENCOUNTER — OFFICE VISIT (OUTPATIENT)
Age: 74
End: 2024-05-13
Payer: MEDICARE

## 2024-05-13 VITALS
SYSTOLIC BLOOD PRESSURE: 142 MMHG | DIASTOLIC BLOOD PRESSURE: 86 MMHG | TEMPERATURE: 98 F | HEART RATE: 57 BPM | HEIGHT: 61 IN | BODY MASS INDEX: 19.9 KG/M2 | WEIGHT: 105.4 LBS

## 2024-05-13 DIAGNOSIS — M32.8 OTHER FORMS OF SYSTEMIC LUPUS ERYTHEMATOSUS, UNSPECIFIED ORGAN INVOLVEMENT STATUS: Primary | ICD-10-CM

## 2024-05-13 DIAGNOSIS — U07.1 THROMBOCYTOPENIA DUE TO COVID-19 VIRUS: ICD-10-CM

## 2024-05-13 DIAGNOSIS — M81.0 AGE RELATED OSTEOPOROSIS, UNSPECIFIED PATHOLOGICAL FRACTURE PRESENCE: ICD-10-CM

## 2024-05-13 DIAGNOSIS — Z79.899 HIGH RISK MEDICATION USE: ICD-10-CM

## 2024-05-13 DIAGNOSIS — D69.59 THROMBOCYTOPENIA DUE TO COVID-19 VIRUS: ICD-10-CM

## 2024-05-13 DIAGNOSIS — R53.83 FATIGUE, UNSPECIFIED TYPE: ICD-10-CM

## 2024-05-13 DIAGNOSIS — L93.2 CUTANEOUS LUPUS ERYTHEMATOSUS: ICD-10-CM

## 2024-05-13 PROCEDURE — 3079F DIAST BP 80-89 MM HG: CPT | Performed by: INTERNAL MEDICINE

## 2024-05-13 PROCEDURE — 3077F SYST BP >= 140 MM HG: CPT | Performed by: INTERNAL MEDICINE

## 2024-05-13 PROCEDURE — 99214 OFFICE O/P EST MOD 30 MIN: CPT | Performed by: INTERNAL MEDICINE

## 2024-05-13 RX ORDER — HYDROXYCHLOROQUINE SULFATE 200 MG/1
200 TABLET, FILM COATED ORAL DAILY
Qty: 30 TABLET | Refills: 4 | Status: SHIPPED | OUTPATIENT
Start: 2024-05-13 | End: 2024-10-10

## 2024-05-13 RX ORDER — DICYCLOMINE HYDROCHLORIDE 10 MG/1
10 CAPSULE ORAL DAILY PRN
COMMUNITY
Start: 2024-02-20 | End: 2024-05-20

## 2024-05-13 RX ORDER — METOPROLOL SUCCINATE 25 MG/1
25 TABLET, EXTENDED RELEASE ORAL DAILY
COMMUNITY
Start: 2024-04-29

## 2024-05-13 NOTE — PROGRESS NOTES
[facility] Cardiothoracic Surgery Office Follow Up Note     Date of Encounter: 2024     Name: Ro Walker  : 1950     Referred By: No ref. provider found  PCP: Alec Arellano MD    Chief Complaint:    Chief Complaint   Patient presents with    Follow-up     1 YR FU with CTA Abd / Pelvis for Hx of EVAR 22       Subjective      History of Present Illness:    Ro Walker is a very pleasant 74 y.o. female current smoker with history of cervical cancer s/p hysterectomy  with Dr Dietrich, lupus on Plaquenil, LLE DVT , PVD, and AAA s/p EVAR with Arthur excluder device via left femoral endarterectomy with pericardial patch 2022 by Dr. Polk.    Her post-op imaging demonstrated possibly clinically insignificant tyle 1 endoleak as her native sac decreased from ~4.5 to 3.6m.  She presents today for her annual surveillance. Her abdominal complaints have resolved. 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 She arrives asymptomatically hypertensive today but tells me she forgot to take her mediation this morning before leaving for this apt, currently only on lisinopril and metoprolol for blood pressure control. She continues to be followed by cardiology YODIT JARQUIN, GI Dr Simmons, and Rheumatology Dr Allen. Unfortunately, she continues to smoke.     Review of Systems:  Review of Systems   Constitutional: Positive for malaise/fatigue. Negative for chills, decreased appetite, diaphoresis, fever, night sweats, weight gain and weight loss.   HENT:  Negative for hoarse voice.    Eyes:  Negative for blurred vision, double vision and visual disturbance.   Cardiovascular:  Negative for chest pain, claudication, dyspnea on exertion, irregular heartbeat, leg swelling, near-syncope, orthopnea, palpitations, paroxysmal nocturnal dyspnea and syncope.   Respiratory:  Negative for cough, hemoptysis, shortness of breath, sputum  production and wheezing.    Hematologic/Lymphatic: Negative for adenopathy and bleeding problem. Bruises/bleeds easily.   Skin:  Negative for color change, nail changes, poor wound healing and rash.   Musculoskeletal:  Positive for back pain and joint pain. Negative for falls and muscle cramps.   Gastrointestinal:  Negative for abdominal pain, dysphagia and heartburn.   Genitourinary:  Negative for flank pain.   Neurological:  Positive for dizziness, light-headedness and loss of balance. Negative for brief paralysis, disturbances in coordination, focal weakness, headaches, numbness, paresthesias, sensory change, vertigo and weakness.   Psychiatric/Behavioral:  Negative for depression and suicidal ideas.    Allergic/Immunologic: Negative for persistent infections.       I have reviewed the following portions of the patient's history: problem list, current medications, allergies, past surgical history, past medical history, past social history, past family history, and ROS and confirm it's accurate.    Allergies:  Allergies   Allergen Reactions    Tetanus Toxoids Itching and Rash    Latex Rash    Statins Unknown - Low Severity       Medications:      Current Outpatient Medications:     alendronate (FOSAMAX) 70 MG tablet, Take 1 tablet by mouth Every 7 (Seven) Days. Only takes 1 weekly -  only takes when remembers, Disp: , Rfl:     aspirin 81 MG EC tablet, Take 1 tablet by mouth Daily., Disp: 30 tablet, Rfl: 11    hydroxychloroquine (PLAQUENIL) 200 MG tablet, Take 1 tablet by mouth Daily for 150 days. Indications: Systemic Lupus Erythematosus, Disp: 30 tablet, Rfl: 4    latanoprost (XALATAN) 0.005 % ophthalmic solution, Administer 1 drop into the left eye Every Night., Disp: , Rfl:     linaclotide (LINZESS) 290 MCG capsule capsule, Take 1 capsule by mouth Every Morning Before Breakfast., Disp: 90 capsule, Rfl: 3    lisinopril (PRINIVIL,ZESTRIL) 40 MG tablet, Take 1 tablet by mouth Daily., Disp: , Rfl:     metoprolol  succinate XL (TOPROL-XL) 25 MG 24 hr tablet, Take 1 tablet by mouth Daily., Disp: , Rfl:     nitroglycerin (NITROSTAT) 0.4 MG SL tablet, 1 under the tongue as needed for angina, may repeat q5mins for up three doses, Disp: 25 tablet, Rfl: 11    History:   Past Medical History:   Diagnosis Date    Abnormal ECG     Abnormal Pap smear of cervix     Arthritis     Bowel obstruction     Hx bowel incarceration    Cervical cancer     Chest pain     Chronic laryngitis     Cutaneous lupus erythematosus     Dizzy     DVT (deep venous thrombosis)     Dyspnea     Gastrointestinal hemorrhage with melena 06/21/2020    Added automatically from request for surgery [id number]    GI hemorrhage     H/O LLE DVT 6/2020 on ? Xarelto 06/21/2020    High risk medication use     Hoarseness of voice     Hypertension     Incarcerated inguinal hernia     Incontinence of urine     more stress induced    Lupus     Osteoporosis     Pneumonia of both lower lobes due to infectious organism 06/21/2020    Polyp of vocal cord     Postprocedural intraabdominal abscess 06/22/2020    Retinal detachment     Right knee pain     Sensitive skin     SLE (systemic lupus erythematosus)     SOBOE (shortness of breath on exertion)     Thrombocytopenia     Urine white blood cells increased     Vocal fold polyp     Wears glasses     readers       Past Surgical History:   Procedure Laterality Date    ABDOMINAL AORTIC ANEURYSM REPAIR WITH ENDOGRAFT N/A 11/22/2022    Procedure: ABDOMINAL AORTIC ANEURYSM REPAIR WITH ENDOGRAFT, LEFT FEMORAL ENDARTERECTOMY WITH PATCH ANGIOPLASTY;  Surgeon: Nikko Polk MD;  Location: [facility] HYBRID DEANDRE;  Service: Cardiothoracic;  Laterality: N/A;  MGY: 341  FLUORO: 18.48  ISOVUE 300: 60M    CATARACT EXTRACTION Left 2008    CERVICAL CONE BIOPSY      CERVICAL CONE BIOPSY      COLON RESECTION SMALL BOWEL N/A 06/13/2020    Procedure: COLON RESECTION SMALL BOWEL;  Surgeon: Huy Lara MD;  Location: [facility] OR;  Service: General;   "Laterality: N/A;    COLONOSCOPY N/A 06/23/2020    Procedure: COLONOSCOPY;  Surgeon: Kareem Feldman MD;  Location:  [facility];  Service: Gastroenterology;  Laterality: N/A;    CORONARY STENT PLACEMENT      HEART STINTS    ENDOSCOPY Left 06/22/2020    Procedure: ESOPHAGOGASTRODUODENOSCOPY;  Surgeon: Kareem Feldman MD;  Location:  [facility];  Service: Gastroenterology;  Laterality: Left;    INGUINAL HERNIA REPAIR Left 06/13/2020    Procedure: INGUINAL HERNIA REPAIR LEFT;  Surgeon: Huy Lara MD;  Location:  [facility];  Service: General;  Laterality: Left;    LAPAROSCOPIC ASSISTED VAGINAL HYSTERECTOMY N/A 02/26/2018    Procedure: LAPAROSCOPIC ASSISTED VAGINAL HYSTERECTOMY, BILATERAL SALPINGO OOPHORECTOMY;  Surgeon: María Dietrich MD;  Location:  [facility];  Service:     RETINAL DETACHMENT REPAIR Left 2006    THROAT SURGERY  2017    polyps removed    TUBAL ABDOMINAL LIGATION         Social History     Socioeconomic History    Marital status:     Number of children: 4   Tobacco Use    Smoking status: Every Day     Current packs/day: 0.50     Average packs/day: 0.5 packs/day for 58.4 years (29.2 ttl pk-yrs)     Types: Cigarettes     Start date: 1966     Passive exposure: Never    Smokeless tobacco: Never   Vaping Use    Vaping status: Never Used   Substance and Sexual Activity    Alcohol use: No    Drug use: No    Sexual activity: Defer        Family History   Problem Relation Age of Onset    Heart attack Mother     Heart disease Mother     Stroke Mother     Stroke Father     Stomach cancer Sister     Cancer Brother     Cancer Brother     Colon cancer Neg Hx         Unknown       Objective   Physical Exam:  Vitals:    05/20/24 1018 05/20/24 1023   BP: (!) 184/90 180/90   BP Location: Right arm Left arm   Patient Position: Sitting Sitting   Pulse: 56    Temp: 98.3 °F (36.8 °C)    SpO2: 99%    Weight: 48.1 kg (106 lb)    Height: 152.4 cm (60\")  Comment: patient reported       Body mass index " is 20.7 kg/m².    Physical Exam  Vitals and nursing note reviewed.   Constitutional:       Appearance: Normal appearance.   Cardiovascular:      Rate and Rhythm: Normal rate.   Pulmonary:      Effort: Pulmonary effort is normal.   Skin:     General: Skin is warm and dry.   Neurological:      Mental Status: She is alert and oriented to person, place, and time. Mental status is at baseline.         Imaging/Labs:  CT Angiogram Abdomen Pelvis (04/24/2024 11:23)   1. Infrarenal abdominal aortic aneurysm status post endograft repair without evidence of endoleak or aneurysm sac enlargement.  2. Increasing stenosis in the right renal artery this appears high-grade, and is best visualized on coronal images. Given the occluded origin of the left renal artery, consideration for renal artery stenting and angiogram should be made.   Electronically Signed: Presley Villegas MD    CT Angiogram Abdomen Pelvis (03/15/2023 09:19)  1. Patent aortobiiliac stent graft without obvious type II endoleak. There is some question as to the presence of a clinically insignificant type I endoleak on the left side, at or near the renal artery ostium. Please see above discussion.  2. Interim decrease, abdominal aortic aneurysm sac caliber, currently measuring 3.5 cm in maximum AP dimension on axial images.  3. Occluded left hypogastric arterial origin with reconstitution of a somewhat diminutive vessel.  4. Continued patency of the right main renal and hypogastric arteries.  5. Chronic occlusion/critical stenosis, left main renal artery, supplying a somewhat atrophic kidney.   6. Numerous additional findings, both vascular and nonvascular, as discussed above in great detail.     CT Abdomen Pelvis ([facility])-9/28/2022  Impression: Abdominal aortic aneurysm has increased in size by 1 cm in the last 2 years.  Now measures 4.5 cm.  Constipation pattern.  Small left kidneys, previously present on 6/21/2020.  Hysterectomy, also present  previously.  Pelvic floor prolapse, another chronic finding.     CT Abdomen Pelvis ()-6/21/2020  Postoperative changes from recent inguinal hernia surgery. Lower lobe lobe consolidation bilaterally. Fluid collection wit hin the deep pelvis measuring 2.5 x 3.3 x 1.8 cm.  Fluid collection within the left anterior pelvis measuring 10.8 x 6.2 x  5.3 cm with tiny focus of air. Additional ancillary findings are unchanged.     IMPRESSION:  2 fluid collections within the pelvis, sterility of which cannot be confirmed by imaging.      CT Abdomen Pelvis -6/15/2020  Atherosclerotic abdominal aorta measuring up to 3.7 cm aneurysmal in the infrarenal portion.   1. Dilated fluid-filled small bowel within the left midabdomen extending to the lower midabdomen where there is anastomosis in the right lower midabdomen concerning for transition point and obstructive or at least partially obstructive gas pattern with postsurgical changes including extraluminal gas and fluid which should be correlated with surgical history to evaluate for appropriate time point, however no definitive peripherally enhancing focal fluid collection to suggest abscess, however, likely premature for development of definitive abscess given postsurgical changes. Limited evaluation of intraluminal contrast extent of involvement due to obstructive bowel gas pattern.  2. Opacifications of the lung bases right greater than left of moderate to severe atelectatic changes of subsegmental involvement with trace bilateral pleural effusions.  3. Asymmetric edema within the lower left abdomen and left thigh body wall tissues may represent postsurgical change given left inguinal soft tissue gas, however, underlying deep vein thrombosis could have a similar appearance and may be further evaluated with duplex.    Assessment / Plan      Assessment / Plan:  Diagnoses and all orders for this visit:    1. Infrarenal abdominal aortic aneurysm (AAA) without rupture s/p EVAR  11/2022 (Primary)  enlarging AAA s/p EVAR with [place] excluder device via left femoral endarterectomy with pericardial patch on 11/22/2022 by Dr. Polk   Preoperative measurement of ~4.5cm   Post-op imaging with question of possible clinically insignificant type I endoleak. However, native sac down from ~4.5cm to 3.6cm on personal measurement   Annual surveillance asymptomatic, questionable BP control  CT imaging personally reviewed demonstrates no signs of endoleak, native aneurysmal sac enlargement, migration of graft, or separation of device components. Native sac measures 3.3cm  Continue annual surveillance  Strict BP control  Encourage smoking cessation       2. Renal artery stenosis  CT imaging revealed increasing right renal artery stenosis, possibly high-grade.  Given known occlusion to the origin of the left renal artery radiology is recommending consideration angiogram with renal artery doing  Last Cr 0.60, I do not have a recent GFR. Currently only requiring 2 antihypertensives  In discussing case with Dr Polk, he is recommending referral to nephrology. Pt is requesting Dr Disla in [place]   Should nephrology recommend stenting we can facilitate referral to IR Dr Bates   Encourage smoking cessation     Follow Up:   Return in about 1 year (around 5/20/2025) for Imaging: CTA Abd/Pelvis.   Or sooner for any further concerns or worsening sign and symptoms. If unable to reach us in the office please dial 911 or go to the nearest emergency department.      VIRIDIANA Pena  [facility] Cardiothoracic Surgery      Time Spent: I spent 33 minutes caring for Ro on this date of service. This time includes time spent by me in the following activities: preparing for the visit, reviewing tests, obtaining and/or reviewing a separately obtained history, performing a medically appropriate examination and/or evaluation, counseling and educating the patient/family/caregiver, ordering medications, tests, or  procedures, referring and communicating with other health care professionals, documenting information in the medical record, independently interpreting results and communicating that information with the patient/family/caregiver, and care coordination.

## 2024-05-14 ENCOUNTER — SPECIALTY PHARMACY (OUTPATIENT)
Age: 74
End: 2024-05-14
Payer: MEDICARE

## 2024-05-20 ENCOUNTER — OFFICE VISIT (OUTPATIENT)
Dept: CARDIAC SURGERY | Facility: CLINIC | Age: 74
End: 2024-05-20
Payer: MEDICARE

## 2024-05-20 VITALS
HEART RATE: 56 BPM | OXYGEN SATURATION: 99 % | BODY MASS INDEX: 20.81 KG/M2 | TEMPERATURE: 98.3 F | DIASTOLIC BLOOD PRESSURE: 90 MMHG | HEIGHT: 60 IN | WEIGHT: 106 LBS | SYSTOLIC BLOOD PRESSURE: 180 MMHG

## 2024-05-20 DIAGNOSIS — I70.1 RENAL ARTERY STENOSIS: ICD-10-CM

## 2024-05-20 DIAGNOSIS — I71.43 INFRARENAL ABDOMINAL AORTIC ANEURYSM (AAA) WITHOUT RUPTURE: Primary | ICD-10-CM

## 2024-05-20 PROBLEM — M32.8 OTHER FORMS OF SYSTEMIC LUPUS ERYTHEMATOSUS: Status: RESOLVED | Noted: 2024-05-09 | Resolved: 2024-05-20

## 2024-05-20 PROBLEM — R06.02 SHORTNESS OF BREATH: Status: RESOLVED | Noted: 2023-03-02 | Resolved: 2024-05-20

## 2024-05-20 PROBLEM — R53.83 FATIGUE: Status: RESOLVED | Noted: 2024-05-09 | Resolved: 2024-05-20

## 2024-05-21 ENCOUNTER — TELEPHONE (OUTPATIENT)
Dept: CARDIAC SURGERY | Facility: CLINIC | Age: 74
End: 2024-05-21

## 2024-05-21 NOTE — TELEPHONE ENCOUNTER
Caller: DEREK SILVA    Relationship: Emergency Contact    Best call back number: [phone]     What is the medical concern/diagnosis: RENAL STENOSIS    What specialty or service is being requested: NEPHROLOGY    What is the provider, practice or medical service name: [facility]         What is the office location: [address] [place] [address]    What is the office phone number: [phone]    Any additional details: PT SISTER STATES SHE GAVE CL WRONG NAME FOR NEPHROLOGY PROVIDER=PLEASE REFER PT TO ABOVE OFFICE FOR RENAL STENOSIS.

## 2024-09-05 ENCOUNTER — TELEPHONE (OUTPATIENT)
Age: 74
End: 2024-09-05
Payer: MEDICARE

## 2024-09-05 RX ORDER — PREDNISONE 5 MG/1
TABLET ORAL
Qty: 30 TABLET | Refills: 0 | Status: SHIPPED | OUTPATIENT
Start: 2024-09-05 | End: 2024-09-17

## 2024-09-05 NOTE — TELEPHONE ENCOUNTER
Incoming Refill Request      Medication requested (name and dose): PREDNISONE    Pharmacy where request should be sent: [facility]   #[phone]  FAX #[phone]    Additional details provided by patient: PT SAID SHE IS IN A FLARE & [facility] USUALLY CALLS IN PREDNISONE FOR HER - SHE IS SCHEDULED TO SEE KCW ON 10/9/24    Best call back number: [phone]    Does the patient have less than a 3 day supply:  [x] Yes  [] No    Luiz Castro Rep  09/05/24, 12:23 EDT

## 2024-09-05 NOTE — TELEPHONE ENCOUNTER
I CALLED TO LET PT KNOW THAT THE PREDNISONE HAS BEEN CALLED IN TO HER PHARMACY & TO CALL BACK IF ANY OTHER ISSUES.

## 2024-09-16 ENCOUNTER — TELEPHONE (OUTPATIENT)
Age: 74
End: 2024-09-16

## 2024-10-14 PROBLEM — D69.6 THROMBOCYTOPENIA: Status: ACTIVE | Noted: 2024-10-14

## 2024-10-14 NOTE — ASSESSMENT & PLAN NOTE
Fosamax for 6 years in the past.  Prolia [id number].    1/2023 DEXA stable.   Did well with Prolia but can't afford it. Has not had prolia since 2017.   She worsened on Fosamax. Compliance was an issue.   She could not get Prolia.   She is back on Fosamax.

## 2024-10-14 NOTE — ASSESSMENT & PLAN NOTE
Rash is doing well.   Fatigue has improved.    Plaquenil is effective and well tolerated.  Pt's global disease score is 5 on a 10 pt scale. Pain score 5/10.  Labs today  Take Plaquenil q day.    I will see in 4 months

## 2024-10-14 NOTE — PROGRESS NOTES
Office Follow Up      Date: 10/15/2024   Patient Name: Ro Walker  MRN: [id number]  YOB: 1950    Referring Physician: Alec Arellano,*     Chief Complaint: Systemic lupus      History of Present Illness: Ro Walker is a 74 y.o. female who is here today for follow up on systemic lupus erythematosus.  Generally doing well. Had rash on face but got prednisone burst and taper and did well.    Continues on Plaquenil. No side effects. Eye exams have been stable. Per pt eye exam is up to date.   She is still having problems with memory.   She is taking Fosamax again. She could not afford Prolia.   No oral ulcers, alopecia, Raynaud's, joint pain, or fever.  Had aortic aneurysm surgery at [facility] 2023.  She also had 5 cardiac stents.  Memory has been worse since her surgery.     Systems do not include cardiac, CNS/PNS, GI, pulmonary, renal and cardiovascular  Systems involved include, hematopoietic, integumentary and musculoskeletal.  The problem is stable.  Risk factors include female gender.  Symptoms are aggravated by stress and UV/light exposure.  Symptoms not aggravated by certain foods, chemicals/toxins, drugs, non-compliance to regimen or smoking.  Relieving factors include antimalarials, corticosteroids, protection from sun/UV and rest. Relieving factors do not include aspirin. Relevant history includes positive NATANAEL, positive dsDNA antibodies, low complements and SSA/SSB. Relevant history does not include ECG changes, positive Walker antibodies, positive phospholipid antibodies, proteinuria, renal insufficiency or RNP.  Associated symptoms include fatigue, thrombocytopenia, unusual hair loss and vision changes.  Pertinent negatives include arthralgias, discoid rash, dizziness, headache, leukopenia, malaise, malar rash, muscle weakness, nephritis, pericarditis, photosensitivity, pleuritic pain, proteinuria, Raynaud's phenomenon, seizures and unexplained  fever.    Subjective   Review of Systems: Review of Systems   Constitutional:  Positive for chills, fatigue, unexpected weight gain and unexpected weight loss. Negative for fever.   HENT:  Negative for dental problem, mouth sores, sinus pressure and swollen glands.         Hoarseness  Dry mouth   Eyes:  Positive for blurred vision and visual disturbance. Negative for photophobia, pain and redness.   Respiratory:  Positive for cough. Negative for apnea, chest tightness and shortness of breath.    Cardiovascular:  Negative for chest pain and leg swelling.        Edema     Gastrointestinal:  Negative for abdominal pain, diarrhea, nausea and GERD.   Endocrine: Positive for cold intolerance.        Hair loss   Genitourinary:  Negative for dysuria and hematuria.   Musculoskeletal:  Positive for gait problem.        Morning stiffness     Skin:  Positive for bruise. Negative for dry skin, rash and skin lesions.        Facial Lupus Rash   Neurological:  Positive for tremors, weakness, numbness and headache. Negative for dizziness, seizures, syncope and memory problem.   Hematological:  Negative for adenopathy. Does not bruise/bleed easily.   Psychiatric/Behavioral:  Positive for positive for hyperactivity. Negative for sleep disturbance, suicidal ideas, depressed mood and stress. The patient is not nervous/anxious.         Medications:   Current Outpatient Medications:     alendronate (FOSAMAX) 70 MG tablet, Take 1 tablet by mouth Every 7 (Seven) Days. Only takes 1 weekly -  only takes when remembers, Disp: 12 tablet, Rfl: 2    aspirin 81 MG EC tablet, Take 1 tablet by mouth Daily., Disp: 30 tablet, Rfl: 11    latanoprost (XALATAN) 0.005 % ophthalmic solution, Administer 1 drop into the left eye Every Night., Disp: , Rfl:     linaclotide (LINZESS) 290 MCG capsule capsule, Take 1 capsule by mouth Every Morning Before Breakfast., Disp: 90 capsule, Rfl: 3    lisinopril (PRINIVIL,ZESTRIL) 40 MG tablet, Take 1 tablet by mouth  "Daily., Disp: , Rfl:     metoprolol succinate XL (TOPROL-XL) 25 MG 24 hr tablet, Take 1 tablet by mouth Daily., Disp: , Rfl:     nitroglycerin (NITROSTAT) 0.4 MG SL tablet, 1 under the tongue as needed for angina, may repeat q5mins for up three doses, Disp: 25 tablet, Rfl: 11    hydroxychloroquine (PLAQUENIL) 200 MG tablet, Take 1 tablet by mouth Daily., Disp: 90 tablet, Rfl: 1    Allergies:   Allergies   Allergen Reactions    Tetanus Toxoids Itching and Rash    Latex Rash    Statins Unknown - Low Severity       I have reviewed and updated the patient's chief complaint, history of present illness, review of systems, past medical history, surgical history, family history, social history, medications and allergy list as appropriate.     Objective    Vital Signs:   Vitals:    10/15/24 0824   BP: 130/80   BP Location: Left arm   Patient Position: Sitting   Cuff Size: Adult   Pulse: 66   Temp: 97.3 °F (36.3 °C)   Weight: 47.3 kg (104 lb 4.8 oz)   Height: 152.4 cm (60\")   PainSc:   5   PainLoc: Leg       Body mass index is 20.37 kg/m².    Physical Exam:  GENERAL: The patient is well developed and well nourished.  Cooperative and oriented times three.  Affect is normal.  Hydration appears normal.    HEENT: Normocephalic and atraumatic.  No notable alopecia.  No facial rash.  Lids and conjunctiva are normal.  Pupils are equal and sclera are clear.  I see no oral or nasal ulcers.  Lips, teeth, and gums are within normal limits.     NECK: Neck is supple without adenopathy, masses, or thyromegaly.    LUNGS: Effort is normal. Clear to auscultation bilaterally.  CARDIOVASCULAR: Normal S1, S2.  No murmurs are heard.  ABDOMEN: soft and nontender without HSM.   EXTREMITIES: There is no edema.   I see no cyanosis or clubbing.    SKIN: No active lesions. Hands are hyperpigmented.   NEUROLOGIC: Gait is normal.  MUSCULOSKELETAL: Complete joint exam was performed.  There is full range of motion of the shoulders, elbows, wrists, and " hands without notable deformities, soft tissue swelling, synovitis, or atrophy. Osteoarthritic changes are seen in the PIP and DIP joints.  Hips have good flexion and internal and external rotation.  Knees have no palpable effusions.  There is full extension and full flexion of the knees without pain.  Ankles have no soft tissue swelling, synovitis, or major deformities.    BACK: Straight without notable scoliosis.    Assessment / Plan   Assessment & Plan  Other forms of systemic lupus erythematosus, unspecified organ involvement status  Rash is doing well.   Fatigue has improved.    Plaquenil is effective and well tolerated.  Pt's global disease score is 5 on a 10 pt scale. Pain score 5/10.  Labs today  Take Plaquenil q day.    I will see in 4 months  Cutaneous lupus erythematosus  No lesions currently.   High risk medication use  Plaquenil.  She will need every 12 month ocular exam.  Eye exam is up-to-date and no toxicity has been seen.  Age related osteoporosis, unspecified pathological fracture presence  Fosamax for 6 years in the past.  Prolia [id number].    1/2023 DEXA stable.   Did well with Prolia but can't afford it. Has not had prolia since 2017.   She worsened on Fosamax. Compliance was an issue.   She could not get Prolia.   She is back on Fosamax.   Thrombocytopenia  Baseline appears to be approximately 100,000-130,000.  Stable on most recent lab.    Orders Placed This Encounter   Procedures    CBC Auto Differential    Comprehensive Metabolic Panel    C-reactive Protein    Sedimentation Rate    Urinalysis With Culture If Indicated -       New Medications Ordered This Visit   Medications    hydroxychloroquine (PLAQUENIL) 200 MG tablet     Sig: Take 1 tablet by mouth Daily.     Dispense:  90 tablet     Refill:  1    alendronate (FOSAMAX) 70 MG tablet     Sig: Take 1 tablet by mouth Every 7 (Seven) Days. Only takes 1 weekly -  only takes when remembers     Dispense:  12 tablet     Refill:  2             Follow Up:   Return in about 4 months (around 2/15/2025).        David Allen MD  [facility]

## 2024-10-15 ENCOUNTER — LAB (OUTPATIENT)
Facility: HOSPITAL | Age: 74
End: 2024-10-15
Payer: MEDICARE

## 2024-10-15 ENCOUNTER — OFFICE VISIT (OUTPATIENT)
Age: 74
End: 2024-10-15
Payer: MEDICARE

## 2024-10-15 VITALS
TEMPERATURE: 97.3 F | DIASTOLIC BLOOD PRESSURE: 80 MMHG | SYSTOLIC BLOOD PRESSURE: 130 MMHG | HEIGHT: 60 IN | BODY MASS INDEX: 20.48 KG/M2 | WEIGHT: 104.3 LBS | HEART RATE: 66 BPM

## 2024-10-15 DIAGNOSIS — M81.0 AGE RELATED OSTEOPOROSIS, UNSPECIFIED PATHOLOGICAL FRACTURE PRESENCE: ICD-10-CM

## 2024-10-15 DIAGNOSIS — M32.8 OTHER FORMS OF SYSTEMIC LUPUS ERYTHEMATOSUS, UNSPECIFIED ORGAN INVOLVEMENT STATUS: Primary | ICD-10-CM

## 2024-10-15 DIAGNOSIS — D69.6 THROMBOCYTOPENIA: ICD-10-CM

## 2024-10-15 DIAGNOSIS — Z79.899 HIGH RISK MEDICATION USE: ICD-10-CM

## 2024-10-15 DIAGNOSIS — M32.8 OTHER FORMS OF SYSTEMIC LUPUS ERYTHEMATOSUS, UNSPECIFIED ORGAN INVOLVEMENT STATUS: ICD-10-CM

## 2024-10-15 DIAGNOSIS — L93.2 CUTANEOUS LUPUS ERYTHEMATOSUS: ICD-10-CM

## 2024-10-15 LAB
ALBUMIN SERPL-MCNC: 4.2 G/DL (ref 3.5–5.2)
ALBUMIN/GLOB SERPL: 1.6 G/DL
ALP SERPL-CCNC: 64 U/L (ref 39–117)
ALT SERPL W P-5'-P-CCNC: 20 U/L (ref 1–33)
ANION GAP SERPL CALCULATED.3IONS-SCNC: 16.2 MMOL/L (ref 5–15)
AST SERPL-CCNC: 29 U/L (ref 1–32)
BASOPHILS # BLD AUTO: 0.02 10*3/MM3 (ref 0–0.2)
BASOPHILS NFR BLD AUTO: 0.4 % (ref 0–1.5)
BILIRUB SERPL-MCNC: 0.3 MG/DL (ref 0–1.2)
BILIRUB UR QL STRIP: NEGATIVE
BUN SERPL-MCNC: 12 MG/DL (ref 8–23)
BUN/CREAT SERPL: 17.1 (ref 7–25)
C3 SERPL-MCNC: 119 MG/DL (ref 82–167)
C4 SERPL-MCNC: 28 MG/DL (ref 14–44)
CALCIUM SPEC-SCNC: 9.4 MG/DL (ref 8.6–10.5)
CHLORIDE SERPL-SCNC: 98 MMOL/L (ref 98–107)
CLARITY UR: CLEAR
CO2 SERPL-SCNC: 26.8 MMOL/L (ref 22–29)
COLOR UR: YELLOW
CREAT SERPL-MCNC: 0.7 MG/DL (ref 0.57–1)
CRP SERPL-MCNC: <0.3 MG/DL (ref 0–0.5)
DEPRECATED RDW RBC AUTO: 45.4 FL (ref 37–54)
EGFRCR SERPLBLD CKD-EPI 2021: 89.8 ML/MIN/1.73
EOSINOPHIL # BLD AUTO: 0.04 10*3/MM3 (ref 0–0.4)
EOSINOPHIL NFR BLD AUTO: 0.8 % (ref 0.3–6.2)
ERYTHROCYTE [DISTWIDTH] IN BLOOD BY AUTOMATED COUNT: 13.2 % (ref 12.3–15.4)
ERYTHROCYTE [SEDIMENTATION RATE] IN BLOOD: 21 MM/HR (ref 0–30)
GLOBULIN UR ELPH-MCNC: 2.7 GM/DL
GLUCOSE SERPL-MCNC: 89 MG/DL (ref 65–99)
GLUCOSE UR STRIP-MCNC: NEGATIVE MG/DL
HCT VFR BLD AUTO: 41.1 % (ref 34–46.6)
HGB BLD-MCNC: 13 G/DL (ref 12–15.9)
HGB UR QL STRIP.AUTO: NEGATIVE
HOLD SPECIMEN: NORMAL
IMM GRANULOCYTES # BLD AUTO: 0 10*3/MM3 (ref 0–0.05)
IMM GRANULOCYTES NFR BLD AUTO: 0 % (ref 0–0.5)
KETONES UR QL STRIP: NEGATIVE
LEUKOCYTE ESTERASE UR QL STRIP.AUTO: NEGATIVE
LYMPHOCYTES # BLD AUTO: 1.29 10*3/MM3 (ref 0.7–3.1)
LYMPHOCYTES NFR BLD AUTO: 25.7 % (ref 19.6–45.3)
MCH RBC QN AUTO: 29 PG (ref 26.6–33)
MCHC RBC AUTO-ENTMCNC: 31.6 G/DL (ref 31.5–35.7)
MCV RBC AUTO: 91.5 FL (ref 79–97)
MONOCYTES # BLD AUTO: 0.36 10*3/MM3 (ref 0.1–0.9)
MONOCYTES NFR BLD AUTO: 7.2 % (ref 5–12)
NEUTROPHILS NFR BLD AUTO: 3.31 10*3/MM3 (ref 1.7–7)
NEUTROPHILS NFR BLD AUTO: 65.9 % (ref 42.7–76)
NITRITE UR QL STRIP: NEGATIVE
NRBC BLD AUTO-RTO: 0 /100 WBC (ref 0–0.2)
PH UR STRIP.AUTO: 7 [PH] (ref 5–8)
PLATELET # BLD AUTO: 119 10*3/MM3 (ref 140–450)
PMV BLD AUTO: 12.8 FL (ref 6–12)
POTASSIUM SERPL-SCNC: 3.9 MMOL/L (ref 3.5–5.2)
PROT SERPL-MCNC: 6.9 G/DL (ref 6–8.5)
PROT UR QL STRIP: NEGATIVE
RBC # BLD AUTO: 4.49 10*6/MM3 (ref 3.77–5.28)
SODIUM SERPL-SCNC: 141 MMOL/L (ref 136–145)
SP GR UR STRIP: 1.01 (ref 1–1.03)
UROBILINOGEN UR QL STRIP: NORMAL
WBC NRBC COR # BLD AUTO: 5.02 10*3/MM3 (ref 3.4–10.8)

## 2024-10-15 PROCEDURE — 86160 COMPLEMENT ANTIGEN: CPT

## 2024-10-15 PROCEDURE — 81003 URINALYSIS AUTO W/O SCOPE: CPT

## 2024-10-15 PROCEDURE — 1159F MED LIST DOCD IN RCRD: CPT | Performed by: INTERNAL MEDICINE

## 2024-10-15 PROCEDURE — 1160F RVW MEDS BY RX/DR IN RCRD: CPT | Performed by: INTERNAL MEDICINE

## 2024-10-15 PROCEDURE — 3079F DIAST BP 80-89 MM HG: CPT | Performed by: INTERNAL MEDICINE

## 2024-10-15 PROCEDURE — 99214 OFFICE O/P EST MOD 30 MIN: CPT | Performed by: INTERNAL MEDICINE

## 2024-10-15 PROCEDURE — 85652 RBC SED RATE AUTOMATED: CPT

## 2024-10-15 PROCEDURE — 80053 COMPREHEN METABOLIC PANEL: CPT

## 2024-10-15 PROCEDURE — 3075F SYST BP GE 130 - 139MM HG: CPT | Performed by: INTERNAL MEDICINE

## 2024-10-15 PROCEDURE — 36415 COLL VENOUS BLD VENIPUNCTURE: CPT

## 2024-10-15 PROCEDURE — 85025 COMPLETE CBC W/AUTO DIFF WBC: CPT

## 2024-10-15 PROCEDURE — 86255 FLUORESCENT ANTIBODY SCREEN: CPT

## 2024-10-15 PROCEDURE — 86140 C-REACTIVE PROTEIN: CPT

## 2024-10-15 RX ORDER — ALENDRONATE SODIUM 70 MG/1
70 TABLET ORAL
Qty: 12 TABLET | Refills: 2 | Status: SHIPPED | OUTPATIENT
Start: 2024-10-15

## 2024-10-15 RX ORDER — HYDROXYCHLOROQUINE SULFATE 200 MG/1
200 TABLET, FILM COATED ORAL DAILY
Qty: 90 TABLET | Refills: 1 | Status: SHIPPED | OUTPATIENT
Start: 2024-10-15

## 2024-10-17 LAB — DSDNA AB SER QL CLIF: NEGATIVE

## 2025-02-05 ENCOUNTER — TELEPHONE (OUTPATIENT)
Age: 75
End: 2025-02-05
Payer: MEDICARE

## 2025-02-05 NOTE — TELEPHONE ENCOUNTER
Hub staff attempted to follow warm transfer process and was unsuccessful     Caller: Ro Walker    Relationship to patient: Self    Best call back number: [phone]    Patient is needing: PT HAD A MYCHART MESSAGE FROM [employer]. SHE HAS 2 BLUE CARDS THAT MIGHT HELP WITH INSURANCE. PLEASE CALL PT TO ADVISE.

## 2025-02-07 NOTE — TELEPHONE ENCOUNTER
ADVISED PT TO FAX ALL CURRENT INSURANCE CARDS FRONT AND BACK TO BOTH PROVIDED NUMBERS TO OUR OFFICE AND ONCE RECEIVED WE CAN UPDATE AND CHECK IF THE INSURANCE PAR WITH [employer].    -EILEEN

## 2025-03-18 ENCOUNTER — OFFICE VISIT (OUTPATIENT)
Dept: CARDIOLOGY | Facility: CLINIC | Age: 75
End: 2025-03-18
Payer: MEDICARE

## 2025-03-18 VITALS
BODY MASS INDEX: 20.62 KG/M2 | WEIGHT: 105 LBS | HEART RATE: 64 BPM | SYSTOLIC BLOOD PRESSURE: 209 MMHG | HEIGHT: 60 IN | DIASTOLIC BLOOD PRESSURE: 88 MMHG

## 2025-03-18 DIAGNOSIS — I10 ESSENTIAL HYPERTENSION: ICD-10-CM

## 2025-03-18 DIAGNOSIS — I71.43 INFRARENAL ABDOMINAL AORTIC ANEURYSM (AAA) WITHOUT RUPTURE: Primary | ICD-10-CM

## 2025-03-18 RX ORDER — DONEPEZIL HYDROCHLORIDE 10 MG/1
TABLET, FILM COATED ORAL
COMMUNITY

## 2025-03-18 NOTE — PROGRESS NOTES
Problem list     Subjective   Ro Walker is a 74 y.o. female     Chief Complaint   Patient presents with    Yearly follow up     AAA     Problem list   1.  Infrarenal abdominal aortic aneurysm status post endovascular repair November 2022 by Dr. Polk  2.  Hypertension  3.  Chronic tobacco use  4.  Chest pain  4.1 stress test April 2023 suggestive of inferior ischemia with preserved LV function  4.2 coronary CTA August 2023 with no obstructive disease identified or significant stenosis.  5.  Preserved systolic function  6.  Palpitations  6.1 event monitor April 2023 with no arrhythmias identified  7.  Lupus    HPI    Patient is a 74-year-old female that presents back to the office to be evaluated.  She presents back today doing remarkably well.  She does not describe any chest pain or pressure at all.  She does not describe any dyspnea.  No PND or orthopnea.    No complaints of palpitating nor does she complain of dysrhythmic symptoms.  She clinically feels well.  Her blood pressure is elevated today as she forgot to take her medications.  He generally runs well at home.  She follows with a vascular surgeon in regards to her history of aneurysm.  She is stable otherwise.      Current Outpatient Medications on File Prior to Visit   Medication Sig Dispense Refill    alendronate (FOSAMAX) 70 MG tablet Take 1 tablet by mouth Every 7 (Seven) Days. Only takes 1 weekly -  only takes when remembers 12 tablet 2    aspirin 81 MG EC tablet Take 1 tablet by mouth Daily. 30 tablet 11    donepezil (ARICEPT) 10 MG tablet take ONE-HALF tablet daily FOR TWO WEEKS THEN increase TO ONE tablet daily thereafter      hydroxychloroquine (PLAQUENIL) 200 MG tablet Take 1 tablet by mouth Daily. 90 tablet 1    latanoprost (XALATAN) 0.005 % ophthalmic solution Administer 1 drop into the left eye Every Night.      linaclotide (LINZESS) 290 MCG capsule capsule Take 1 capsule by mouth Every Morning Before Breakfast. 90 capsule 3     lisinopril (PRINIVIL,ZESTRIL) 40 MG tablet Take 1 tablet by mouth Daily.      metoprolol succinate XL (TOPROL-XL) 25 MG 24 hr tablet Take 1 tablet by mouth Daily.      nitroglycerin (NITROSTAT) 0.4 MG SL tablet 1 under the tongue as needed for angina, may repeat q5mins for up three doses 25 tablet 11     No current facility-administered medications on file prior to visit.       Tetanus toxoids, Latex, and Statins    Past Medical History:   Diagnosis Date    Abnormal ECG     Abnormal Pap smear of cervix     Arthritis     Bowel obstruction     Hx bowel incarceration    Cervical cancer     Chest pain     Chronic laryngitis     Cutaneous lupus erythematosus     Dizzy     DVT (deep venous thrombosis)     Dyspnea     Gastrointestinal hemorrhage with melena 06/21/2020    Added automatically from request for surgery [id number]    GI hemorrhage     H/O LLE DVT 6/2020 on ? Xarelto 06/21/2020    High risk medication use     Hoarseness of voice     Hypertension     Incarcerated inguinal hernia     Incontinence of urine     more stress induced    Lupus     Osteoporosis     Pneumonia of both lower lobes due to infectious organism 06/21/2020    Polyp of vocal cord     Postprocedural intraabdominal abscess 06/22/2020    Retinal detachment     Right knee pain     Sensitive skin     SLE (systemic lupus erythematosus)     SOBOE (shortness of breath on exertion)     Thrombocytopenia     Urine white blood cells increased     Vocal fold polyp     Wears glasses     readers       Social History     Socioeconomic History    Marital status:     Number of children: 4   Tobacco Use    Smoking status: Every Day     Current packs/day: 0.50     Average packs/day: 0.5 packs/day for 59.2 years (29.6 ttl pk-yrs)     Types: Cigarettes     Start date: 1966     Passive exposure: Never    Smokeless tobacco: Never   Vaping Use    Vaping status: Never Used   Substance and Sexual Activity    Alcohol use: No    Drug use: No    Sexual activity: Defer  "      Family History   Problem Relation Age of Onset    Heart attack Mother     Heart disease Mother     Stroke Mother     Stroke Father     Stomach cancer Sister     Cancer Brother     Cancer Brother     Colon cancer Neg Hx         Unknown       Review of Systems   Constitutional:  Negative for activity change, appetite change, chills, fatigue and fever.   HENT: Negative.  Negative for congestion, sinus pressure and sinus pain.    Eyes: Negative.  Negative for visual disturbance.   Respiratory: Negative.  Negative for apnea, cough, chest tightness, shortness of breath and wheezing.    Cardiovascular: Negative.  Negative for chest pain, palpitations and leg swelling.   Gastrointestinal: Negative.  Negative for blood in stool.   Endocrine: Negative.  Negative for cold intolerance and heat intolerance.   Genitourinary: Negative.  Negative for hematuria.   Musculoskeletal: Negative.  Negative for gait problem.   Skin: Negative.  Negative for color change, rash and wound.   Allergic/Immunologic: Negative.  Negative for environmental allergies and food allergies.   Neurological:  Negative for dizziness, syncope, weakness, light-headedness, numbness and headaches.   Hematological:  Bruises/bleeds easily.   Psychiatric/Behavioral: Negative.  Negative for sleep disturbance.        Objective   Vitals:    03/18/25 0904   BP: (!) 209/88   BP Location: Right arm   Patient Position: Sitting   Cuff Size: Adult   Pulse: 64   Weight: 47.6 kg (105 lb)   Height: 152.4 cm (60\")      BP (!) 209/88 (BP Location: Right arm, Patient Position: Sitting, Cuff Size: Adult) Comment: PATIENT DIDNT TAKE MEDICATION THIS MORNING  Pulse 64   Ht 152.4 cm (60\")   Wt 47.6 kg (105 lb)   BMI 20.51 kg/m²     Lab Results (most recent)       None            Physical Exam  Vitals and nursing note reviewed.   Constitutional:       General: She is not in acute distress.     Appearance: Normal appearance. She is well-developed.   HENT:      Head: " Normocephalic and atraumatic.   Eyes:      General: No scleral icterus.        Right eye: No discharge.         Left eye: No discharge.      Conjunctiva/sclera: Conjunctivae normal.   Neck:      Vascular: No carotid bruit.   Cardiovascular:      Rate and Rhythm: Normal rate and regular rhythm.      Heart sounds: Normal heart sounds. No murmur heard.     No friction rub. No gallop.   Pulmonary:      Effort: Pulmonary effort is normal. No respiratory distress.      Breath sounds: Normal breath sounds. No wheezing or rales.   Chest:      Chest wall: No tenderness.   Musculoskeletal:      Right lower leg: No edema.      Left lower leg: No edema.   Skin:     General: Skin is warm and dry.      Coloration: Skin is not pale.      Findings: No erythema or rash.   Neurological:      Mental Status: She is alert and oriented to person, place, and time.      Cranial Nerves: No cranial nerve deficit.   Psychiatric:         Behavior: Behavior normal.         Procedure     ECG 12 Lead    Date/Time: 3/18/2025 9:06 AM  Performed by: Nino Clements PA    Authorized by: Nino Clements PA  Comparison: compared with previous ECG from 11/21/2022  Comparison to previous ECG: EKG demonstrates junctional rhythm at 65 bpm with incomplete right bundle branch block, left anterior fascicular block and no acute ST changes             Assessment & Plan     Problems Addressed this Visit          Cardiac and Vasculature    Essential hypertension       Other    Infrarenal abdominal aortic aneurysm (AAA) without rupture s/p EVAR 11/2022 - Primary     Diagnoses         Codes Comments      Infrarenal abdominal aortic aneurysm (AAA) without rupture s/p EVAR 11/2022    -  Primary ICD-10-CM: I71.43  ICD-9-CM: 441.4       Essential hypertension     ICD-10-CM: I10  ICD-9-CM: 401.9           Recommendations  1.  Patient is a 74-year-old female that is doing remarkably well.  She has no angina no intracordal symptoms.  For now, we can continue to  treat medically.    2.  She describes having labs performed by primary.  They apparently monitor lipid status.    3 patient's blood pressure is elevated but she forgot to take her medication today.  It apparently runs much better at home.  We can monitor for now.    4.  Patient follows with vascular surgery in regards to history of aneurysm.  We can continue blood pressure management at this time.    5.  We can see her back for follow-up in a year or sooner if needed.  Follow-up with primary as scheduled.             Ro Walker  reports that she has been smoking cigarettes. She started smoking about 59 years ago. She has a 29.6 pack-year smoking history. She has never been exposed to tobacco smoke. She has never used smokeless tobacco. I have educated her on the risk of diseases from using tobacco products such as cancer, COPD, and heart disease.     I advised her to quit and she is not willing to quit.    I spent 3  minutes counseling the patient.          Patient did not bring med list or medicine bottles to appointment, med list has been reviewed and updated based on patient's knowledge of their meds.      Advance Care Planning   ACP discussion was declined by the patient. Patient does not have an advance directive, declines further assistance.        Electronically signed by:

## 2025-03-18 NOTE — LETTER
March 18, 2025     Alec Arellano MD  [address]  [facility] [address]    Patient: Ro Walker   YOB: 1950   Date of Visit: 3/18/2025       Dear Alec Arellano MD    Ro Walker was in my office today. Below is a copy of my note.    If you have questions, please do not hesitate to call me. I look forward to following Ro along with you.         Sincerely,        MILKA Cueto        CC: No Recipients    Problem list     Subjective  Ro Walker is a 74 y.o. female     Chief Complaint   Patient presents with   • Yearly follow up     AAA     Problem list   1.  Infrarenal abdominal aortic aneurysm status post endovascular repair November 2022 by Dr. Polk  2.  Hypertension  3.  Chronic tobacco use  4.  Chest pain  4.1 stress test April 2023 suggestive of inferior ischemia with preserved LV function  4.2 coronary CTA August 2023 with no obstructive disease identified or significant stenosis.  5.  Preserved systolic function  6.  Palpitations  6.1 event monitor April 2023 with no arrhythmias identified  7.  Lupus    HPI    Patient is a 74-year-old female that presents back to the office to be evaluated.  She presents back today doing remarkably well.  She does not describe any chest pain or pressure at all.  She does not describe any dyspnea.  No PND or orthopnea.    No complaints of palpitating nor does she complain of dysrhythmic symptoms.  She clinically feels well.  Her blood pressure is elevated today as she forgot to take her medications.  He generally runs well at home.  She follows with a vascular surgeon in regards to her history of aneurysm.  She is stable otherwise.      Current Outpatient Medications on File Prior to Visit   Medication Sig Dispense Refill   • alendronate (FOSAMAX) 70 MG tablet Take 1 tablet by mouth Every 7 (Seven) Days. Only takes 1 weekly -  only takes when remembers 12 tablet 2   • aspirin 81 MG EC tablet Take 1 tablet by mouth Daily.  30 tablet 11   • donepezil (ARICEPT) 10 MG tablet take ONE-HALF tablet daily FOR TWO WEEKS THEN increase TO ONE tablet daily thereafter     • hydroxychloroquine (PLAQUENIL) 200 MG tablet Take 1 tablet by mouth Daily. 90 tablet 1   • latanoprost (XALATAN) 0.005 % ophthalmic solution Administer 1 drop into the left eye Every Night.     • linaclotide (LINZESS) 290 MCG capsule capsule Take 1 capsule by mouth Every Morning Before Breakfast. 90 capsule 3   • lisinopril (PRINIVIL,ZESTRIL) 40 MG tablet Take 1 tablet by mouth Daily.     • metoprolol succinate XL (TOPROL-XL) 25 MG 24 hr tablet Take 1 tablet by mouth Daily.     • nitroglycerin (NITROSTAT) 0.4 MG SL tablet 1 under the tongue as needed for angina, may repeat q5mins for up three doses 25 tablet 11     No current facility-administered medications on file prior to visit.       Tetanus toxoids, Latex, and Statins    Past Medical History:   Diagnosis Date   • Abnormal ECG    • Abnormal Pap smear of cervix    • Arthritis    • Bowel obstruction     Hx bowel incarceration   • Cervical cancer    • Chest pain    • Chronic laryngitis    • Cutaneous lupus erythematosus    • Dizzy    • DVT (deep venous thrombosis)    • Dyspnea    • Gastrointestinal hemorrhage with melena 06/21/2020    Added automatically from request for surgery [id number]   • GI hemorrhage    • H/O LLE DVT 6/2020 on ? Xarelto 06/21/2020   • High risk medication use    • Hoarseness of voice    • Hypertension    • Incarcerated inguinal hernia    • Incontinence of urine     more stress induced   • Lupus    • Osteoporosis    • Pneumonia of both lower lobes due to infectious organism 06/21/2020   • Polyp of vocal cord    • Postprocedural intraabdominal abscess 06/22/2020   • Retinal detachment    • Right knee pain    • Sensitive skin    • SLE (systemic lupus erythematosus)    • SOBOE (shortness of breath on exertion)    • Thrombocytopenia    • Urine white blood cells increased    • Vocal fold polyp    • Wears  glasses     readers       Social History     Socioeconomic History   • Marital status:    • Number of children: 4   Tobacco Use   • Smoking status: Every Day     Current packs/day: 0.50     Average packs/day: 0.5 packs/day for 59.2 years (29.6 ttl pk-yrs)     Types: Cigarettes     Start date: 1966     Passive exposure: Never   • Smokeless tobacco: Never   Vaping Use   • Vaping status: Never Used   Substance and Sexual Activity   • Alcohol use: No   • Drug use: No   • Sexual activity: Defer       Family History   Problem Relation Age of Onset   • Heart attack Mother    • Heart disease Mother    • Stroke Mother    • Stroke Father    • Stomach cancer Sister    • Cancer Brother    • Cancer Brother    • Colon cancer Neg Hx         Unknown       Review of Systems   Constitutional:  Negative for activity change, appetite change, chills, fatigue and fever.   HENT: Negative.  Negative for congestion, sinus pressure and sinus pain.    Eyes: Negative.  Negative for visual disturbance.   Respiratory: Negative.  Negative for apnea, cough, chest tightness, shortness of breath and wheezing.    Cardiovascular: Negative.  Negative for chest pain, palpitations and leg swelling.   Gastrointestinal: Negative.  Negative for blood in stool.   Endocrine: Negative.  Negative for cold intolerance and heat intolerance.   Genitourinary: Negative.  Negative for hematuria.   Musculoskeletal: Negative.  Negative for gait problem.   Skin: Negative.  Negative for color change, rash and wound.   Allergic/Immunologic: Negative.  Negative for environmental allergies and food allergies.   Neurological:  Negative for dizziness, syncope, weakness, light-headedness, numbness and headaches.   Hematological:  Bruises/bleeds easily.   Psychiatric/Behavioral: Negative.  Negative for sleep disturbance.        Objective  Vitals:    03/18/25 0904   BP: (!) 209/88   BP Location: Right arm   Patient Position: Sitting   Cuff Size: Adult   Pulse: 64  "  Weight: 47.6 kg (105 lb)   Height: 152.4 cm (60\")      BP (!) 209/88 (BP Location: Right arm, Patient Position: Sitting, Cuff Size: Adult) Comment: PATIENT DIDNT TAKE MEDICATION THIS MORNING  Pulse 64   Ht 152.4 cm (60\")   Wt 47.6 kg (105 lb)   BMI 20.51 kg/m²     Lab Results (most recent)       None            Physical Exam  Vitals and nursing note reviewed.   Constitutional:       General: She is not in acute distress.     Appearance: Normal appearance. She is well-developed.   HENT:      Head: Normocephalic and atraumatic.   Eyes:      General: No scleral icterus.        Right eye: No discharge.         Left eye: No discharge.      Conjunctiva/sclera: Conjunctivae normal.   Neck:      Vascular: No carotid bruit.   Cardiovascular:      Rate and Rhythm: Normal rate and regular rhythm.      Heart sounds: Normal heart sounds. No murmur heard.     No friction rub. No gallop.   Pulmonary:      Effort: Pulmonary effort is normal. No respiratory distress.      Breath sounds: Normal breath sounds. No wheezing or rales.   Chest:      Chest wall: No tenderness.   Musculoskeletal:      Right lower leg: No edema.      Left lower leg: No edema.   Skin:     General: Skin is warm and dry.      Coloration: Skin is not pale.      Findings: No erythema or rash.   Neurological:      Mental Status: She is alert and oriented to person, place, and time.      Cranial Nerves: No cranial nerve deficit.   Psychiatric:         Behavior: Behavior normal.         Procedure    ECG 12 Lead    Date/Time: 3/18/2025 9:06 AM  Performed by: Nino Clements PA    Authorized by: Nino Clements PA  Comparison: compared with previous ECG from 11/21/2022  Comparison to previous ECG: EKG demonstrates junctional rhythm at 65 bpm with incomplete right bundle branch block, left anterior fascicular block and no acute ST changes             Assessment & Plan    Problems Addressed this Visit          Cardiac and Vasculature    Essential " hypertension       Other    Infrarenal abdominal aortic aneurysm (AAA) without rupture s/p EVAR 11/2022 - Primary     Diagnoses         Codes Comments      Infrarenal abdominal aortic aneurysm (AAA) without rupture s/p EVAR 11/2022    -  Primary ICD-10-CM: I71.43  ICD-9-CM: 441.4       Essential hypertension     ICD-10-CM: I10  ICD-9-CM: 401.9           Recommendations  1.  Patient is a 74-year-old female that is doing remarkably well.  She has no angina no intracordal symptoms.  For now, we can continue to treat medically.    2.  She describes having labs performed by primary.  They apparently monitor lipid status.    3 patient's blood pressure is elevated but she forgot to take her medication today.  It apparently runs much better at home.  We can monitor for now.    4.  Patient follows with vascular surgery in regards to history of aneurysm.  We can continue blood pressure management at this time.    5.  We can see her back for follow-up in a year or sooner if needed.  Follow-up with primary as scheduled.             Ro Walker  reports that she has been smoking cigarettes. She started smoking about 59 years ago. She has a 29.6 pack-year smoking history. She has never been exposed to tobacco smoke. She has never used smokeless tobacco. I have educated her on the risk of diseases from using tobacco products such as cancer, COPD, and heart disease.     I advised her to quit and she is not willing to quit.    I spent 3  minutes counseling the patient.          Patient did not bring med list or medicine bottles to appointment, med list has been reviewed and updated based on patient's knowledge of their meds.      Advance Care Planning  ACP discussion was declined by the patient. Patient does not have an advance directive, declines further assistance.        Electronically signed by:

## 2025-03-19 DIAGNOSIS — I71.43 INFRARENAL ABDOMINAL AORTIC ANEURYSM (AAA) WITHOUT RUPTURE: Primary | ICD-10-CM

## 2025-03-24 NOTE — PROGRESS NOTES
Discharge Planning Assessment  [facility]     Patient Name: Ro Walker  MRN: [id number]  Today's Date: 6/15/2020    Admit Date: 6/13/2020    Discharge Needs Assessment     Row Name 06/15/20 1209       Living Environment    Lives With  alone    Current Living Arrangements  home/apartment/condo    Primary Care Provided by  self    Provides Primary Care For  no one    Family Caregiver if Needed  child(allan), adult;friend(s);sibling(s)    Quality of Family Relationships  helpful;involved;supportive    Able to Return to Prior Arrangements  yes       Resource/Environmental Concerns    Resource/Environmental Concerns  none       Transition Planning    Patient/Family Anticipates Transition to  home with family    Patient/Family Anticipated Services at Transition  none    Transportation Anticipated  family or friend will provide       Discharge Needs Assessment    Readmission Within the Last 30 Days  no previous admission in last 30 days    Concerns to be Addressed  discharge planning;denies needs/concerns at this time    Equipment Currently Used at Home  none Has RW available        Discharge Plan     Row Name 06/15/20 1210       Plan    Plan  Home with sister    Patient/Family in Agreement with Plan  yes    Plan Comments  Met with patient and friend in the room to initiate discharge planning. Patient lives alone in a home in [place]. She is independent with ADLs and mobility and does not use any DME. Patient will discharge to her sister's home while she recuperates from surgery. Family will provide her ride. Patient does not anticipate any discharge needs at this time. CM will continue to follow.     Final Discharge Disposition Code  01 - home or self-care        Destination      Coordination has not been started for this encounter.      Durable Medical Equipment      Coordination has not been started for this encounter.      Dialysis/Infusion      Coordination has not been started for this encounter.       Home Medical Care      Coordination has not been started for this encounter.      Therapy      Coordination has not been started for this encounter.      Community Resources      Coordination has not been started for this encounter.        Expected Discharge Date and Time     Expected Discharge Date Expected Discharge Time    Jun 17, 2020         Demographic Summary     Row Name 06/15/20 1208       General Information    Admission Type  inpatient    Referral Source  admission list    Reason for Consult  discharge planning    General Information Comments  Confirmed with patient that her PCP is Tracy Schrader, that she has medical coverage through Medicare A & B and [place] Medicaid, and rx coverage through Medicare D.         Functional Status     Row Name 06/15/20 1209       Functional Status    Usual Activity Tolerance  good       Functional Status, IADL    Medications  independent    Meal Preparation  independent    Housekeeping  independent    Laundry  independent    Shopping  independent        Psychosocial    No documentation.       Abuse/Neglect    No documentation.       Legal    No documentation.       Substance Abuse    No documentation.       Patient Forms    No documentation.           Tracy Murcia RN     No/Unable to asses

## 2025-04-02 DIAGNOSIS — I71.43 INFRARENAL ABDOMINAL AORTIC ANEURYSM (AAA) WITHOUT RUPTURE: Primary | ICD-10-CM

## 2025-04-02 DIAGNOSIS — I70.1 RENAL ARTERY STENOSIS: ICD-10-CM

## 2025-04-11 DIAGNOSIS — I71.43 INFRARENAL ABDOMINAL AORTIC ANEURYSM (AAA) WITHOUT RUPTURE: ICD-10-CM

## 2025-05-07 ENCOUNTER — TELEPHONE (OUTPATIENT)
Dept: CARDIAC SURGERY | Facility: CLINIC | Age: 75
End: 2025-05-07
Payer: MEDICARE

## 2025-05-07 NOTE — TELEPHONE ENCOUNTER
"Follow up appointment with our office cancelled due to patient having [facility] HMO insurance and [employer] currently non par with this insurance. However, we ordered CTA abd/pel for patient which she had done. VIRIDIANA Castro, reviewed patient's scan and advised that EVAR stent graft looks good/no change since last scan. 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 Patient will need referral to another surgeon or PCP/cardiology will need to manage annual surveillance of stent graft.     Contacted patient's sister, Vivi, and relayed this information. She stated that she would prefer that patient continue follow up with our office. I explained that we cannot schedule unless patient is able to change her insurance to a plan that is participating with [facility]. She stated that she will be working on this and will call us to schedule once she is able to switch the insurance.   "

## 2025-07-01 ENCOUNTER — TELEPHONE (OUTPATIENT)
Dept: GASTROENTEROLOGY | Facility: CLINIC | Age: 75
End: 2025-07-01
Payer: MEDICARE

## (undated) DEVICE — DECANTER BAG 9": Brand: MEDLINE INDUSTRIES, INC.

## (undated) DEVICE — FLTR PLUMEPORT LAP W/CONN STRL

## (undated) DEVICE — INTRO SHEATH DRYSEAL FLEX 16F 5.3TO6.1MM 33CM

## (undated) DEVICE — ADAPT ST INFUS ADMIN SYR 70IN

## (undated) DEVICE — ENSEAL 20 CM SHAFT, LARGE JAW: Brand: ENSEAL X1

## (undated) DEVICE — INTRO SHEATH DRYSEAL FLEX 12F4TO4.7MM 33CM

## (undated) DEVICE — SPNG GZ WOVN 4X4IN 12PLY 10/BX STRL

## (undated) DEVICE — CATH GUIDE BERN 5F 65CM

## (undated) DEVICE — GLV SURG PREMIERPRO MIC LTX PF SZ7 BRN

## (undated) DEVICE — SOL LR 1000ML

## (undated) DEVICE — PERCLOSE™ PROSTYLE™ SUTURE-MEDIATED CLOSURE AND REPAIR SYSTEM: Brand: PERCLOSE™ PROSTYLE™

## (undated) DEVICE — 3M™ IOBAN™ 2 ANTIMICROBIAL INCISE DRAPE 6651EZ: Brand: IOBAN™ 2

## (undated) DEVICE — GOWN,NON-REINFORCED,SIRUS,SET IN SLV,XXL: Brand: MEDLINE

## (undated) DEVICE — COVER,LIGHT HANDLE,FLX,1/PK: Brand: MEDLINE INDUSTRIES, INC.

## (undated) DEVICE — ELECTRD BLD EXT EDGE 1P COAT 6.5IN STRL

## (undated) DEVICE — GLV SURG DERMASSURE GRN LF PF SZ 6.5

## (undated) DEVICE — PATIENT RETURN ELECTRODE, SINGLE-USE, CONTACT QUALITY MONITORING, ADULT, WITH 9FT CORD, FOR PATIENTS WEIGING OVER 33LBS. (15KG): Brand: MEGADYNE

## (undated) DEVICE — GLIDEX™ COATED HYDROPHILIC GUIDEWIRE: Brand: MAGIC TORQUE™

## (undated) DEVICE — SUT MNCRYL PLS ANTIB UD 3/0 PS2 27IN

## (undated) DEVICE — RADIFOCUS TORQUE DEVICE MULTI-TORQUE VISE: Brand: RADIFOCUS TORQUE DEVICE

## (undated) DEVICE — BIOPSY SHIELD-ORANGE: Brand: RADPAD

## (undated) DEVICE — RADIFOCUS GLIDEWIRE: Brand: GLIDEWIRE

## (undated) DEVICE — KT ORCA ORCAPOD DISP STRL

## (undated) DEVICE — ADHS SKIN PREMIERPRO EXOFIN TOPICAL HI/VISC .5ML

## (undated) DEVICE — NDL HYPO ECLPS SFTY 18G 1 1/2IN

## (undated) DEVICE — ENDOPATH XCEL UNIVERSAL TROCAR STABLILITY SLEEVES: Brand: ENDOPATH XCEL

## (undated) DEVICE — TROCARS: Brand: KII® BALLOON BLUNT TIP SYSTEM

## (undated) DEVICE — ENDOPATH XCEL BLADELESS TROCARS WITH STABILITY SLEEVES: Brand: ENDOPATH XCEL

## (undated) DEVICE — PK VASC 10

## (undated) DEVICE — PK BARIATRIC 10

## (undated) DEVICE — GW AMPLTZ SUPERSTIFF STR .035IN 180CM

## (undated) DEVICE — TRAP FLD MINIVAC MEGADYNE 100ML

## (undated) DEVICE — HYBRID CO2 TUBING/CAP SET FOR OLYMPUS® SCOPES & CO2 SOURCE: Brand: ERBE

## (undated) DEVICE — SNAP KOVER: Brand: UNBRANDED

## (undated) DEVICE — LN INJ CONTRST FLXCIL HP BR F/M LL W/ADAPT/ROT 1200PSI 48IN

## (undated) DEVICE — NDL FLTR BLNT 18G 1 1/2IN

## (undated) DEVICE — [HIGH FLOW INSUFFLATOR,  DO NOT USE IF PACKAGE IS DAMAGED,  KEEP DRY,  KEEP AWAY FROM SUNLIGHT,  PROTECT FROM HEAT AND RADIOACTIVE SOURCES.]: Brand: PNEUMOSURE

## (undated) DEVICE — SUT SILK 3/0 TIES 18IN A184H

## (undated) DEVICE — GLV SURG DERMASSURE GRN LF PF 7.0

## (undated) DEVICE — INTRO ACCSR BLNT TP

## (undated) DEVICE — HARMONIC ACE +7 LAPAROSCOPIC SHEARS ADVANCED HEMOSTASIS 5MM DIAMETER 36CM SHAFT LENGTH  FOR USE WITH GRAY HAND PIECE ONLY: Brand: HARMONIC ACE

## (undated) DEVICE — AIRWY 90MM NO9

## (undated) DEVICE — ANTIBACTERIAL UNDYED BRAIDED (POLYGLACTIN 910), SYNTHETIC ABSORBABLE SUTURE: Brand: COATED VICRYL

## (undated) DEVICE — GLV SURG SENSICARE GREEN W/ALOE PF LF 7 STRL

## (undated) DEVICE — NDL SPINE 22G 31/2IN BLK

## (undated) DEVICE — TBG VITL VUE XLNG

## (undated) DEVICE — BOWL UTIL STRL 32OZ

## (undated) DEVICE — SPNG VERSALON 4X4 4PLY NONSTRL LF BG/200

## (undated) DEVICE — SUT VICYL PLS CTD ANTIB BR 1 27IN VIL

## (undated) DEVICE — GLV SURG SENSICARE PI MIC PF SZ8.5 LF STRL

## (undated) DEVICE — SYR LUERLOK 30CC

## (undated) DEVICE — RADIFOCUS GLIDEWIRE ADVANTAGE GUIDEWIRE: Brand: GLIDEWIRE ADVANTAGE

## (undated) DEVICE — 1 ML TUBERCULIN SYRINGE REGULAR TIP: Brand: MONOJECT

## (undated) DEVICE — CANNULA,OXY,ADULT,SUPERSOFT,W/7'TUB,UC: Brand: MEDLINE

## (undated) DEVICE — GOWN,REINF,POLY,ECL,PP SLV,3XL,XLONG: Brand: MEDLINE

## (undated) DEVICE — ANTIBACTERIAL UNDYED BRAIDED (POLYGLACTIN 910), SYNTHETIC ABSORBABLE SURGICAL SUTURE: Brand: COATED VICRYL

## (undated) DEVICE — STPCK 4WY ON/OFF VLV M/COLAR FIT 45PSI STRL

## (undated) DEVICE — MEDI-VAC NON-CONDUCTIVE SUCTION TUBING: Brand: CARDINAL HEALTH

## (undated) DEVICE — SINGLE-USE BIOPSY FORCEPS: Brand: RADIAL JAW 4

## (undated) DEVICE — SUCTION CANISTER, 1000CC,SAFELINER: Brand: DEROYAL

## (undated) DEVICE — BALN OCCL MOLDING .035 10TO37MM 4X90CM

## (undated) DEVICE — CATH SZ ACCUVU SEG/20CM PIG .038IN 5F 70CM

## (undated) DEVICE — SYR LUERLOK 50ML

## (undated) DEVICE — GLV SURG SENSICARE MICRO PF LF 6.5 STRL

## (undated) DEVICE — GOWN,REINF,POLY,ECL,PP SLV,XL: Brand: MEDLINE

## (undated) DEVICE — SUT SILK 2/0 TIES 18IN A185H

## (undated) DEVICE — 3M™ STERI-DRAPE™ INSTRUMENT POUCH 1018: Brand: STERI-DRAPE™

## (undated) DEVICE — SUT PROLN 6/0 C1 D/A 30IN 8706H

## (undated) DEVICE — DRSNG SURG AQUACEL AG/ADVNTGE 9X15CM 3.5X6IN

## (undated) DEVICE — TBG PENCL TELESCP MEGADYNE SMOKE EVAC 10FT

## (undated) DEVICE — TBG INJ CONTRL EXCITE RA 1200PSI 48IN

## (undated) DEVICE — CONTN GRAD MEAS TRIANG 32OZ BLK

## (undated) DEVICE — ENCORE® LATEX MICRO SIZE 7, STERILE LATEX POWDER-FREE SURGICAL GLOVE: Brand: ENCORE

## (undated) DEVICE — SYRINGE, LUER LOCK, 5ML: Brand: MEDLINE

## (undated) DEVICE — ST. VAGINAL PACKING X-RAY DETECTABLE: Brand: DEROYAL

## (undated) DEVICE — CATH ANGIO BEACON VANSCHIE5/TP 5F .035IN 65CM

## (undated) DEVICE — GLV SURG SENSICARE MICRO PF LF 8.5 STRL

## (undated) DEVICE — GLV SURG SENSICARE PI MIC PF SZ6 LF STRL

## (undated) DEVICE — TUBING, SUCTION, 1/4" X 10', STRAIGHT: Brand: MEDLINE

## (undated) DEVICE — SOL IRR H2O BTL 1000ML STRL

## (undated) DEVICE — SKIN AFFIX SURG ADHESIVE 72/CS 0.55ML: Brand: MEDLINE

## (undated) DEVICE — NDL PERC 1PRT THNWALL W/BASEPLT 18G 7CM

## (undated) DEVICE — PK LAP HYST 10

## (undated) DEVICE — SI AVANTI+ 7F STD W/GW  NO OBT: Brand: AVANTI

## (undated) DEVICE — LUBE GEL ENDOGLIDE 1.1OZ

## (undated) DEVICE — GLV SURG SENSICARE MICRO PF LF 6 STRL

## (undated) DEVICE — MEDI-VAC YANKAUER SUCTION HANDLE W/BULBOUS TIP: Brand: CARDINAL HEALTH

## (undated) DEVICE — THE BITE BLOCK MAXI, LATEX FREE STRAP IS USED TO PROTECT THE ENDOSCOPE INSERTION TUBE FROM BEING BITTEN BY THE PATIENT.